# Patient Record
Sex: MALE | Race: WHITE | Employment: OTHER | URBAN - NONMETROPOLITAN AREA
[De-identification: names, ages, dates, MRNs, and addresses within clinical notes are randomized per-mention and may not be internally consistent; named-entity substitution may affect disease eponyms.]

---

## 2017-02-13 ENCOUNTER — OFFICE VISIT (OUTPATIENT)
Dept: FAMILY MEDICINE CLINIC | Age: 72
End: 2017-02-13

## 2017-02-13 VITALS
SYSTOLIC BLOOD PRESSURE: 122 MMHG | OXYGEN SATURATION: 96 % | DIASTOLIC BLOOD PRESSURE: 76 MMHG | HEIGHT: 65 IN | HEART RATE: 99 BPM | BODY MASS INDEX: 29.32 KG/M2 | WEIGHT: 176 LBS

## 2017-02-13 DIAGNOSIS — Z12.5 PROSTATE CANCER SCREENING: ICD-10-CM

## 2017-02-13 DIAGNOSIS — M54.17 LUMBOSACRAL RADICULOPATHY: ICD-10-CM

## 2017-02-13 DIAGNOSIS — R09.81 CHRONIC NASAL CONGESTION: Chronic | ICD-10-CM

## 2017-02-13 DIAGNOSIS — E55.9 VITAMIN D DEFICIENCY: ICD-10-CM

## 2017-02-13 DIAGNOSIS — I25.83 CORONARY ARTERY DISEASE DUE TO LIPID RICH PLAQUE: ICD-10-CM

## 2017-02-13 DIAGNOSIS — F32.9 REACTIVE DEPRESSION: Primary | ICD-10-CM

## 2017-02-13 DIAGNOSIS — E78.2 MIXED HYPERLIPIDEMIA: ICD-10-CM

## 2017-02-13 DIAGNOSIS — M47.817 FACET JOINT DISEASE OF LUMBOSACRAL REGION: ICD-10-CM

## 2017-02-13 DIAGNOSIS — I25.10 CORONARY ARTERY DISEASE DUE TO LIPID RICH PLAQUE: ICD-10-CM

## 2017-02-13 DIAGNOSIS — R73.9 HYPERGLYCEMIA: ICD-10-CM

## 2017-02-13 DIAGNOSIS — G89.4 CHRONIC PAIN SYNDROME: ICD-10-CM

## 2017-02-13 DIAGNOSIS — J32.4 CHRONIC PANSINUSITIS: ICD-10-CM

## 2017-02-13 DIAGNOSIS — F51.01 PRIMARY INSOMNIA: ICD-10-CM

## 2017-02-13 DIAGNOSIS — M54.41 CHRONIC MIDLINE LOW BACK PAIN WITH BILATERAL SCIATICA: ICD-10-CM

## 2017-02-13 DIAGNOSIS — S32.000S COMPRESSION FRACTURE OF LUMBAR VERTEBRA, SEQUELA: Chronic | ICD-10-CM

## 2017-02-13 DIAGNOSIS — F17.200 SMOKER'S RESPIRATORY SYNDROME: ICD-10-CM

## 2017-02-13 DIAGNOSIS — M15.3 POST-TRAUMATIC OSTEOARTHRITIS OF MULTIPLE JOINTS: ICD-10-CM

## 2017-02-13 DIAGNOSIS — G89.29 CHRONIC MIDLINE LOW BACK PAIN WITH BILATERAL SCIATICA: ICD-10-CM

## 2017-02-13 DIAGNOSIS — M54.42 CHRONIC MIDLINE LOW BACK PAIN WITH BILATERAL SCIATICA: ICD-10-CM

## 2017-02-13 DIAGNOSIS — Z23 NEED FOR INFLUENZA VACCINATION: ICD-10-CM

## 2017-02-13 DIAGNOSIS — M51.36 DEGENERATION OF INTERVERTEBRAL DISC OF LUMBAR REGION: ICD-10-CM

## 2017-02-13 PROCEDURE — 1123F ACP DISCUSS/DSCN MKR DOCD: CPT | Performed by: FAMILY MEDICINE

## 2017-02-13 PROCEDURE — G0008 ADMIN INFLUENZA VIRUS VAC: HCPCS | Performed by: FAMILY MEDICINE

## 2017-02-13 PROCEDURE — 4040F PNEUMOC VAC/ADMIN/RCVD: CPT | Performed by: FAMILY MEDICINE

## 2017-02-13 PROCEDURE — 90688 IIV4 VACCINE SPLT 0.5 ML IM: CPT | Performed by: FAMILY MEDICINE

## 2017-02-13 PROCEDURE — 99214 OFFICE O/P EST MOD 30 MIN: CPT | Performed by: FAMILY MEDICINE

## 2017-02-13 PROCEDURE — G8484 FLU IMMUNIZE NO ADMIN: HCPCS | Performed by: FAMILY MEDICINE

## 2017-02-13 PROCEDURE — G8420 CALC BMI NORM PARAMETERS: HCPCS | Performed by: FAMILY MEDICINE

## 2017-02-13 PROCEDURE — 3017F COLORECTAL CA SCREEN DOC REV: CPT | Performed by: FAMILY MEDICINE

## 2017-02-13 PROCEDURE — G8427 DOCREV CUR MEDS BY ELIG CLIN: HCPCS | Performed by: FAMILY MEDICINE

## 2017-02-13 PROCEDURE — 4004F PT TOBACCO SCREEN RCVD TLK: CPT | Performed by: FAMILY MEDICINE

## 2017-02-13 PROCEDURE — G8599 NO ASA/ANTIPLAT THER USE RNG: HCPCS | Performed by: FAMILY MEDICINE

## 2017-02-13 RX ORDER — DULOXETIN HYDROCHLORIDE 60 MG/1
CAPSULE, DELAYED RELEASE ORAL
Qty: 30 CAPSULE | Refills: 11 | Status: SHIPPED | OUTPATIENT
Start: 2017-02-13 | End: 2018-02-14 | Stop reason: SDUPTHER

## 2017-02-13 RX ORDER — OXYCODONE AND ACETAMINOPHEN 10; 325 MG/1; MG/1
TABLET ORAL
Refills: 0 | COMMUNITY
Start: 2017-01-21 | End: 2017-12-18 | Stop reason: SDUPTHER

## 2017-02-13 RX ORDER — FLUTICASONE PROPIONATE 50 MCG
SPRAY, SUSPENSION (ML) NASAL
Qty: 1 BOTTLE | Refills: 11 | Status: SHIPPED | OUTPATIENT
Start: 2017-02-13 | End: 2018-06-21 | Stop reason: SDUPTHER

## 2017-02-13 ASSESSMENT — PATIENT HEALTH QUESTIONNAIRE - PHQ9
SUM OF ALL RESPONSES TO PHQ QUESTIONS 1-9: 0
2. FEELING DOWN, DEPRESSED OR HOPELESS: 0
1. LITTLE INTEREST OR PLEASURE IN DOING THINGS: 0
SUM OF ALL RESPONSES TO PHQ9 QUESTIONS 1 & 2: 0

## 2017-03-20 DIAGNOSIS — J41.0 SIMPLE CHRONIC BRONCHITIS (HCC): ICD-10-CM

## 2017-03-20 RX ORDER — BUDESONIDE AND FORMOTEROL FUMARATE DIHYDRATE 160; 4.5 UG/1; UG/1
AEROSOL RESPIRATORY (INHALATION)
Qty: 10.2 G | Refills: 11 | Status: SHIPPED | OUTPATIENT
Start: 2017-03-20 | End: 2018-04-09 | Stop reason: SDUPTHER

## 2017-03-22 ENCOUNTER — NURSE ONLY (OUTPATIENT)
Dept: FAMILY MEDICINE CLINIC | Age: 72
End: 2017-03-22

## 2017-03-22 DIAGNOSIS — M54.41 CHRONIC MIDLINE LOW BACK PAIN WITH BILATERAL SCIATICA: ICD-10-CM

## 2017-03-22 DIAGNOSIS — G89.29 CHRONIC MIDLINE LOW BACK PAIN WITH BILATERAL SCIATICA: ICD-10-CM

## 2017-03-22 DIAGNOSIS — R73.9 HYPERGLYCEMIA: ICD-10-CM

## 2017-03-22 DIAGNOSIS — E55.9 VITAMIN D DEFICIENCY: ICD-10-CM

## 2017-03-22 DIAGNOSIS — M54.42 CHRONIC MIDLINE LOW BACK PAIN WITH BILATERAL SCIATICA: ICD-10-CM

## 2017-03-22 DIAGNOSIS — E78.2 MIXED HYPERLIPIDEMIA: ICD-10-CM

## 2017-03-22 DIAGNOSIS — Z12.5 PROSTATE CANCER SCREENING: ICD-10-CM

## 2017-03-22 LAB
A/G RATIO: 2.2 (ref 1.1–2.2)
ALBUMIN SERPL-MCNC: 4.6 G/DL (ref 3.4–5)
ALP BLD-CCNC: 102 U/L (ref 40–129)
ALT SERPL-CCNC: 12 U/L (ref 10–40)
ANION GAP SERPL CALCULATED.3IONS-SCNC: 15 MMOL/L (ref 3–16)
AST SERPL-CCNC: 14 U/L (ref 15–37)
BASOPHILS ABSOLUTE: 0 K/UL (ref 0–0.2)
BASOPHILS RELATIVE PERCENT: 0.6 %
BILIRUB SERPL-MCNC: 0.3 MG/DL (ref 0–1)
BUN BLDV-MCNC: 12 MG/DL (ref 7–20)
CALCIUM SERPL-MCNC: 9.7 MG/DL (ref 8.3–10.6)
CHLORIDE BLD-SCNC: 101 MMOL/L (ref 99–110)
CHOLESTEROL, TOTAL: 187 MG/DL (ref 0–199)
CO2: 27 MMOL/L (ref 21–32)
CREAT SERPL-MCNC: 0.8 MG/DL (ref 0.8–1.3)
EOSINOPHILS ABSOLUTE: 0.3 K/UL (ref 0–0.6)
EOSINOPHILS RELATIVE PERCENT: 3.5 %
GFR AFRICAN AMERICAN: >60
GFR NON-AFRICAN AMERICAN: >60
GLOBULIN: 2.1 G/DL
GLUCOSE BLD-MCNC: 95 MG/DL (ref 70–99)
HCT VFR BLD CALC: 47.6 % (ref 40.5–52.5)
HDLC SERPL-MCNC: 64 MG/DL (ref 40–60)
HEMOGLOBIN: 15.8 G/DL (ref 13.5–17.5)
LDL CHOLESTEROL CALCULATED: 106 MG/DL
LYMPHOCYTES ABSOLUTE: 2.3 K/UL (ref 1–5.1)
LYMPHOCYTES RELATIVE PERCENT: 31.9 %
MCH RBC QN AUTO: 34 PG (ref 26–34)
MCHC RBC AUTO-ENTMCNC: 33.2 G/DL (ref 31–36)
MCV RBC AUTO: 102.2 FL (ref 80–100)
MONOCYTES ABSOLUTE: 0.6 K/UL (ref 0–1.3)
MONOCYTES RELATIVE PERCENT: 8.4 %
NEUTROPHILS ABSOLUTE: 4.1 K/UL (ref 1.7–7.7)
NEUTROPHILS RELATIVE PERCENT: 55.6 %
PDW BLD-RTO: 13.8 % (ref 12.4–15.4)
PLATELET # BLD: 209 K/UL (ref 135–450)
PMV BLD AUTO: 9.2 FL (ref 5–10.5)
POTASSIUM SERPL-SCNC: 4.4 MMOL/L (ref 3.5–5.1)
PROSTATE SPECIFIC ANTIGEN: 0.48 NG/ML (ref 0–4)
RBC # BLD: 4.66 M/UL (ref 4.2–5.9)
SODIUM BLD-SCNC: 143 MMOL/L (ref 136–145)
TOTAL PROTEIN: 6.7 G/DL (ref 6.4–8.2)
TRIGL SERPL-MCNC: 83 MG/DL (ref 0–150)
TSH SERPL DL<=0.05 MIU/L-ACNC: 1.01 UIU/ML (ref 0.27–4.2)
VITAMIN D 25-HYDROXY: 33.3 NG/ML
VLDLC SERPL CALC-MCNC: 17 MG/DL
WBC # BLD: 7.3 K/UL (ref 4–11)

## 2017-03-22 PROCEDURE — 36415 COLL VENOUS BLD VENIPUNCTURE: CPT | Performed by: FAMILY MEDICINE

## 2017-03-29 ENCOUNTER — TELEPHONE (OUTPATIENT)
Dept: FAMILY MEDICINE CLINIC | Age: 72
End: 2017-03-29

## 2017-03-29 DIAGNOSIS — E78.2 MIXED HYPERLIPIDEMIA: Primary | ICD-10-CM

## 2017-03-29 DIAGNOSIS — R79.89 ABNORMAL LFTS: ICD-10-CM

## 2017-03-29 DIAGNOSIS — E78.5 HYPERLIPIDEMIA, UNSPECIFIED HYPERLIPIDEMIA TYPE: ICD-10-CM

## 2017-03-29 RX ORDER — ATORVASTATIN CALCIUM 20 MG/1
40 TABLET, FILM COATED ORAL DAILY
Qty: 30 TABLET | Refills: 11 | Status: SHIPPED | OUTPATIENT
Start: 2017-03-29 | End: 2017-08-14 | Stop reason: SDUPTHER

## 2017-05-17 DIAGNOSIS — F17.200 SMOKER'S RESPIRATORY SYNDROME: ICD-10-CM

## 2017-05-17 RX ORDER — ALBUTEROL SULFATE 90 UG/1
AEROSOL, METERED RESPIRATORY (INHALATION)
Qty: 1 INHALER | Refills: 3 | Status: SHIPPED | OUTPATIENT
Start: 2017-05-17 | End: 2017-08-15 | Stop reason: SDUPTHER

## 2017-06-14 ENCOUNTER — HOSPITAL ENCOUNTER (OUTPATIENT)
Dept: OTHER | Age: 72
Discharge: HOME | End: 2017-06-14
Attending: ANESTHESIOLOGY | Admitting: ANESTHESIOLOGY

## 2017-06-14 DIAGNOSIS — M43.16 SPONDYLOLISTHESIS OF LUMBAR REGION: ICD-10-CM

## 2017-08-14 ENCOUNTER — OFFICE VISIT (OUTPATIENT)
Dept: FAMILY MEDICINE CLINIC | Age: 72
End: 2017-08-14

## 2017-08-14 VITALS
HEART RATE: 71 BPM | DIASTOLIC BLOOD PRESSURE: 76 MMHG | SYSTOLIC BLOOD PRESSURE: 126 MMHG | OXYGEN SATURATION: 97 % | WEIGHT: 176.2 LBS | BODY MASS INDEX: 29.36 KG/M2 | HEIGHT: 65 IN

## 2017-08-14 DIAGNOSIS — I25.83 CORONARY ARTERY DISEASE DUE TO LIPID RICH PLAQUE: ICD-10-CM

## 2017-08-14 DIAGNOSIS — G89.29 CHRONIC LOW BACK PAIN, UNSPECIFIED BACK PAIN LATERALITY, WITH SCIATICA PRESENCE UNSPECIFIED: ICD-10-CM

## 2017-08-14 DIAGNOSIS — F17.200 SMOKER'S RESPIRATORY SYNDROME: ICD-10-CM

## 2017-08-14 DIAGNOSIS — F51.01 PRIMARY INSOMNIA: ICD-10-CM

## 2017-08-14 DIAGNOSIS — E78.2 MIXED HYPERLIPIDEMIA: ICD-10-CM

## 2017-08-14 DIAGNOSIS — R73.9 HYPERGLYCEMIA: ICD-10-CM

## 2017-08-14 DIAGNOSIS — I25.10 CORONARY ARTERY DISEASE DUE TO LIPID RICH PLAQUE: ICD-10-CM

## 2017-08-14 DIAGNOSIS — S68.119S AMPUTATION FINGER, SEQUELA (HCC): ICD-10-CM

## 2017-08-14 DIAGNOSIS — R07.89 RIGHT-SIDED CHEST WALL PAIN: Primary | ICD-10-CM

## 2017-08-14 DIAGNOSIS — M51.36 DEGENERATION OF INTERVERTEBRAL DISC OF LUMBAR REGION: ICD-10-CM

## 2017-08-14 DIAGNOSIS — M54.5 CHRONIC LOW BACK PAIN, UNSPECIFIED BACK PAIN LATERALITY, WITH SCIATICA PRESENCE UNSPECIFIED: ICD-10-CM

## 2017-08-14 DIAGNOSIS — Z13.6 SCREENING FOR AAA (ABDOMINAL AORTIC ANEURYSM): ICD-10-CM

## 2017-08-14 DIAGNOSIS — F32.9 REACTIVE DEPRESSION: ICD-10-CM

## 2017-08-14 DIAGNOSIS — J41.1 MUCOPURULENT CHRONIC BRONCHITIS (HCC): ICD-10-CM

## 2017-08-14 PROCEDURE — G8599 NO ASA/ANTIPLAT THER USE RNG: HCPCS | Performed by: FAMILY MEDICINE

## 2017-08-14 PROCEDURE — G8926 SPIRO NO PERF OR DOC: HCPCS | Performed by: FAMILY MEDICINE

## 2017-08-14 PROCEDURE — 99214 OFFICE O/P EST MOD 30 MIN: CPT | Performed by: FAMILY MEDICINE

## 2017-08-14 PROCEDURE — G8419 CALC BMI OUT NRM PARAM NOF/U: HCPCS | Performed by: FAMILY MEDICINE

## 2017-08-14 PROCEDURE — 4004F PT TOBACCO SCREEN RCVD TLK: CPT | Performed by: FAMILY MEDICINE

## 2017-08-14 PROCEDURE — 3023F SPIROM DOC REV: CPT | Performed by: FAMILY MEDICINE

## 2017-08-14 PROCEDURE — 4040F PNEUMOC VAC/ADMIN/RCVD: CPT | Performed by: FAMILY MEDICINE

## 2017-08-14 PROCEDURE — G8427 DOCREV CUR MEDS BY ELIG CLIN: HCPCS | Performed by: FAMILY MEDICINE

## 2017-08-14 PROCEDURE — 3017F COLORECTAL CA SCREEN DOC REV: CPT | Performed by: FAMILY MEDICINE

## 2017-08-14 PROCEDURE — 1123F ACP DISCUSS/DSCN MKR DOCD: CPT | Performed by: FAMILY MEDICINE

## 2017-08-14 RX ORDER — ATORVASTATIN CALCIUM 40 MG/1
TABLET, FILM COATED ORAL
Refills: 11 | COMMUNITY
Start: 2017-06-30 | End: 2017-08-14 | Stop reason: CLARIF

## 2017-08-14 RX ORDER — IPRATROPIUM BROMIDE AND ALBUTEROL SULFATE 2.5; .5 MG/3ML; MG/3ML
SOLUTION RESPIRATORY (INHALATION)
Qty: 120 VIAL | Refills: 11 | Status: SHIPPED | OUTPATIENT
Start: 2017-08-14

## 2017-08-14 RX ORDER — ATORVASTATIN CALCIUM 40 MG/1
40 TABLET, FILM COATED ORAL DAILY
Qty: 30 TABLET | Refills: 11 | Status: SHIPPED | OUTPATIENT
Start: 2017-08-14 | End: 2018-01-01 | Stop reason: SDUPTHER

## 2017-08-14 RX ORDER — OXYCODONE HYDROCHLORIDE 15 MG/1
1 TABLET, FILM COATED, EXTENDED RELEASE ORAL EVERY 12 HOURS SCHEDULED
Refills: 0 | Status: ON HOLD | COMMUNITY
Start: 2017-06-08 | End: 2019-01-01

## 2017-08-14 RX ORDER — PREDNISONE 10 MG/1
TABLET ORAL
Qty: 30 TABLET | Refills: 0 | Status: SHIPPED | OUTPATIENT
Start: 2017-08-14 | End: 2017-12-18 | Stop reason: ALTCHOICE

## 2017-08-15 ENCOUNTER — TELEPHONE (OUTPATIENT)
Dept: FAMILY MEDICINE CLINIC | Age: 72
End: 2017-08-15

## 2017-08-15 DIAGNOSIS — F17.200 SMOKER'S RESPIRATORY SYNDROME: ICD-10-CM

## 2017-08-15 RX ORDER — ALBUTEROL SULFATE 90 UG/1
AEROSOL, METERED RESPIRATORY (INHALATION)
Qty: 8.5 G | Refills: 3 | Status: SHIPPED | OUTPATIENT
Start: 2017-08-15 | End: 2017-11-14 | Stop reason: SDUPTHER

## 2017-08-18 ENCOUNTER — HOSPITAL ENCOUNTER (OUTPATIENT)
Dept: ULTRASOUND IMAGING | Age: 72
Discharge: HOME | End: 2017-08-18
Attending: FAMILY MEDICINE | Admitting: FAMILY MEDICINE

## 2017-08-18 DIAGNOSIS — Z13.6 ENCOUNTER FOR SCREENING FOR CARDIOVASCULAR DISORDERS: ICD-10-CM

## 2017-08-18 DIAGNOSIS — Z13.6 SCREENING FOR AAA (ABDOMINAL AORTIC ANEURYSM): ICD-10-CM

## 2017-08-28 ENCOUNTER — CARE COORDINATION (OUTPATIENT)
Dept: CARE COORDINATION | Age: 72
End: 2017-08-28

## 2017-10-06 ENCOUNTER — HOSPITAL ENCOUNTER (OUTPATIENT)
Dept: GENERAL RADIOLOGY | Age: 72
Discharge: HOME | End: 2017-10-06
Attending: NEUROLOGICAL SURGERY | Admitting: NEUROLOGICAL SURGERY

## 2017-10-06 DIAGNOSIS — R93.7 MUSCULOSKELETAL SYSTEM IMAGING ABNORMALITY: ICD-10-CM

## 2017-10-06 DIAGNOSIS — R93.7 ABNORMAL FINDINGS ON DIAGNOSTIC IMAGING OF OTHER PARTS OF MUSCULOSKELETAL SYSTEM: ICD-10-CM

## 2017-10-13 ENCOUNTER — NURSE ONLY (OUTPATIENT)
Dept: FAMILY MEDICINE CLINIC | Age: 72
End: 2017-10-13

## 2017-10-13 DIAGNOSIS — Z23 NEED FOR INFLUENZA VACCINATION: Primary | ICD-10-CM

## 2017-10-13 PROCEDURE — 90688 IIV4 VACCINE SPLT 0.5 ML IM: CPT | Performed by: FAMILY MEDICINE

## 2017-10-13 PROCEDURE — G0008 ADMIN INFLUENZA VIRUS VAC: HCPCS | Performed by: FAMILY MEDICINE

## 2017-10-13 NOTE — PROGRESS NOTES
Vaccine Information Sheet, \"Influenza - Inactivated\"  given to Katia Black, or parent/legal guardian of  Katia Black and verbalized understanding. Patient responses:    Have you ever had a reaction to a flu vaccine? No  Are you able to eat eggs without adverse effects? Yes  Do you have any current illness? No  Have you ever had Guillian Denbo Syndrome? No    Flu vaccine given per order. Please see immunization tab.

## 2017-11-14 DIAGNOSIS — F17.200 SMOKER'S RESPIRATORY SYNDROME: ICD-10-CM

## 2017-12-05 ENCOUNTER — TELEPHONE (OUTPATIENT)
Dept: FAMILY MEDICINE CLINIC | Age: 72
End: 2017-12-05

## 2017-12-12 NOTE — TELEPHONE ENCOUNTER
Attempted to contact patient on 12/12/2017. Result: left message on the patient's voicemail asking patient to return my call. Pre-Visit planning not completed.

## 2017-12-13 NOTE — TELEPHONE ENCOUNTER
Attempted to contact patient on 12/13/2017. Result: left message with female asking patient to return my call. Pre-Visit planning not completed.

## 2017-12-18 ENCOUNTER — OFFICE VISIT (OUTPATIENT)
Dept: FAMILY MEDICINE CLINIC | Age: 72
End: 2017-12-18

## 2017-12-18 VITALS
SYSTOLIC BLOOD PRESSURE: 120 MMHG | BODY MASS INDEX: 29.3 KG/M2 | WEIGHT: 171.6 LBS | HEART RATE: 71 BPM | DIASTOLIC BLOOD PRESSURE: 66 MMHG | OXYGEN SATURATION: 95 % | HEIGHT: 64 IN

## 2017-12-18 DIAGNOSIS — Z12.5 PROSTATE CANCER SCREENING: ICD-10-CM

## 2017-12-18 DIAGNOSIS — R73.9 HYPERGLYCEMIA: ICD-10-CM

## 2017-12-18 DIAGNOSIS — J44.1 COPD WITH ACUTE EXACERBATION (HCC): ICD-10-CM

## 2017-12-18 DIAGNOSIS — S32.030S CLOSED COMPRESSION FRACTURE OF THIRD LUMBAR VERTEBRA, SEQUELA: Chronic | ICD-10-CM

## 2017-12-18 DIAGNOSIS — G89.4 CHRONIC PAIN SYNDROME: ICD-10-CM

## 2017-12-18 DIAGNOSIS — E55.9 VITAMIN D DEFICIENCY: ICD-10-CM

## 2017-12-18 DIAGNOSIS — F33.41 MAJOR DEPRESSIVE DISORDER, RECURRENT, IN PARTIAL REMISSION (HCC): ICD-10-CM

## 2017-12-18 DIAGNOSIS — E78.2 MIXED HYPERLIPIDEMIA: ICD-10-CM

## 2017-12-18 DIAGNOSIS — I25.10 CORONARY ARTERY DISEASE DUE TO LIPID RICH PLAQUE: ICD-10-CM

## 2017-12-18 DIAGNOSIS — I25.83 CORONARY ARTERY DISEASE DUE TO LIPID RICH PLAQUE: ICD-10-CM

## 2017-12-18 DIAGNOSIS — F17.200 SMOKER'S RESPIRATORY SYNDROME: Primary | ICD-10-CM

## 2017-12-18 PROCEDURE — G8427 DOCREV CUR MEDS BY ELIG CLIN: HCPCS | Performed by: FAMILY MEDICINE

## 2017-12-18 PROCEDURE — G8926 SPIRO NO PERF OR DOC: HCPCS | Performed by: FAMILY MEDICINE

## 2017-12-18 PROCEDURE — G8484 FLU IMMUNIZE NO ADMIN: HCPCS | Performed by: FAMILY MEDICINE

## 2017-12-18 PROCEDURE — 99214 OFFICE O/P EST MOD 30 MIN: CPT | Performed by: FAMILY MEDICINE

## 2017-12-18 PROCEDURE — 3017F COLORECTAL CA SCREEN DOC REV: CPT | Performed by: FAMILY MEDICINE

## 2017-12-18 PROCEDURE — 4040F PNEUMOC VAC/ADMIN/RCVD: CPT | Performed by: FAMILY MEDICINE

## 2017-12-18 PROCEDURE — G8599 NO ASA/ANTIPLAT THER USE RNG: HCPCS | Performed by: FAMILY MEDICINE

## 2017-12-18 PROCEDURE — 4004F PT TOBACCO SCREEN RCVD TLK: CPT | Performed by: FAMILY MEDICINE

## 2017-12-18 PROCEDURE — G8419 CALC BMI OUT NRM PARAM NOF/U: HCPCS | Performed by: FAMILY MEDICINE

## 2017-12-18 PROCEDURE — 3023F SPIROM DOC REV: CPT | Performed by: FAMILY MEDICINE

## 2017-12-18 PROCEDURE — 1123F ACP DISCUSS/DSCN MKR DOCD: CPT | Performed by: FAMILY MEDICINE

## 2017-12-18 RX ORDER — PREDNISONE 20 MG/1
40 TABLET ORAL
COMMUNITY
Start: 2017-12-17 | End: 2017-12-24

## 2017-12-18 RX ORDER — OXYCODONE AND ACETAMINOPHEN 10; 325 MG/1; MG/1
7.5 TABLET ORAL EVERY 6 HOURS PRN
COMMUNITY
Start: 2017-08-31 | End: 2019-01-01

## 2017-12-18 NOTE — PROGRESS NOTES
SUBJECTIVE:    2017    Wilfred Saliva (: )    67 y.o.    male    Prior to Visit Medications    Medication Sig Taking? Authorizing Provider   predniSONE (DELTASONE) 20 MG tablet Take 40 mg by mouth Yes Historical Provider, MD   oxyCODONE-acetaminophen (PERCOCET)  MG per tablet Take by mouth . Yes Historical Provider, MD   PROAIR  (90 Base) MCG/ACT inhaler INHALE 2 PUFFS BY MOUTH 4 TIMES DAILY WITH AWAY FROM HOME Yes Ollie Urrutia MD   OXYCONTIN 15 MG T12A extended release tablet  Yes Historical Provider, MD   ipratropium-albuterol (DUONEB) 0.5-2.5 (3) MG/3ML SOLN nebulizer solution Use qid Yes Ollie Urrutia MD   atorvastatin (LIPITOR) 40 MG tablet Take 1 tablet by mouth daily Yes Ollie Urrutia MD   SYMBICORT 160-4.5 MCG/ACT AERO INHALE 2 PUFFS INTO THE LUNGS 2 TIMES DAILY Yes Ollie Urrutia MD   fluticasone [facility]) 50 MCG/ACT nasal spray 2 sprays each nostril daily Yes Ollie Urrutia MD   DULoxetine (CYMBALTA) 60 MG extended release capsule 1 daily Yes Ollie Urrutia MD   Respiratory Therapy Supplies (NEBULIZER COMPRESSOR) KIT 1 kit by Does not apply route once for 1 dose  Ollie Urrutia MD   oxyCODONE-acetaminophen (PERCOCET)  MG per tablet Take 1 tablet by mouth 2 times daily Oh CTP RX 29778  Fill after 16  Silvestre Wray NP       ALLERGIES:  No Known Allergies    Chief Complaint   Patient presents with    Depression     He states sometimes he gets down at times and anxious.  Hyperlipidemia     Watching diet.  Insomnia     He states he has some trouble sleeping.  Fall     He states he was in [place] ER yesterday due to he tripped on step on porch and fell  They did CT scan and Cxray. He did not have any fractures.  Shaking     He get shakiness in his hands x 1 month.  Wheezing     He sometimes has wheezing and chest congestion x a long while.     States he fell yesterday going upstairs and he went to [place] ER, no fractured ribs and breathing was OK, he has to  some pain med and prednisone to  today. He is still smoking 1ppd or less, he states he is just too anxious to stop. He is SOB with exertion. He takes the duloxetine every day. Since he talks about being anxious, offered to prescribe some Buspar to help him with this. Has tremor of hands. Discussed getting a low dose CT of the chest screening but he had a chest CT yesterday which showed emphysema and no pulmonary nodules. AAA screening was normal. He is sleeping with his oxygen. Will discussed screening CT of chest next year. HPI  Kassidy Lomeli periodically notices shaking when he is trying to do some purposeful activity, such as turning pages. He wonders why he is getting more short of breath. Has noted wheezing and chest congestion. Is here for cholesterol. Tolerating medication. Trying to watch low-fat diet. Weight stable. No change in exercise. HX: (> 3 status chronic/inact. Prob) or  Wt Readings from Last 3 Encounters:   12/18/17 171 lb 9.6 oz (77.8 kg)   08/27/17 176 lb (79.8 kg)   08/14/17 176 lb 3.2 oz (79.9 kg)       Occupation Retired factory              HPI:  (>4 )  LOCATION:  QUALITY:SEVERITY:  DURATION:  TIMING:  CONTEXT:  MOD. FACTORS:  ASSOC. S/S:    Pertinent items are noted in HPI.  (+/- 2-9 systems)  ROS  All other systems reviewed and are negative except as noted above  Additional review of systems may be scanned into the media section of this medical record. Any responses requiring further intervention were pursued.     Past Medical History:   Diagnosis Date    Amputation finger     Ankle fracture, left     Chronic nasal congestion     Chronic pain syndrome     Compression fracture of lumbar vertebra (HCC)     COPD (chronic obstructive pulmonary disease) (HCC)     DDD (degenerative disc disease)     Depression     Facet joint disease of lumbosacral 08/27/2017 >60     GFR  08/27/2017 >60     Calcium 08/27/2017 9.2     Total Protein 08/27/2017 6.7     Alb 08/27/2017 3.8     Albumin/Globulin Ratio 08/27/2017 1.3     Total Bilirubin 08/27/2017 0.5     Alkaline Phosphatase 08/27/2017 157*    ALT 08/27/2017 15     AST 08/27/2017 20     Globulin 08/27/2017 2.9     WBC 08/27/2017 8.9     RBC 08/27/2017 4.45     Hemoglobin 08/27/2017 14.9     Hematocrit 08/27/2017 44.5     MCV 08/27/2017 100.1*    MCH 08/27/2017 33.6     MCHC 08/27/2017 33.6     RDW 08/27/2017 13.8     Platelets 59/55/0829 258     MPV 08/27/2017 8.2     Neutrophils % 08/27/2017 72.7     Lymphocytes % 08/27/2017 13.2     Monocytes % 08/27/2017 11.4     Eosinophils % 08/27/2017 2.2     Basophils % 08/27/2017 0.5     Neutrophils # 08/27/2017 6.5     Lymphocytes # 08/27/2017 1.2     Monocytes # 08/27/2017 1.0     Eosinophils # 08/27/2017 0.2     Basophils # 08/27/2017 0.0     Ventricular Rate 08/30/2017 91     Atrial Rate 08/30/2017 91     P-R Interval 08/30/2017 152     QRS Duration 08/30/2017 84     Q-T Interval 08/30/2017 356     QTc Calculation (Bazett) 08/30/2017 437     P Axis 08/30/2017 60     R Axis 08/30/2017 -33     T Axis 08/30/2017 47     Diagnosis 08/30/2017                      Value:Normal sinus rhythm  Left axis deviation  Abnormal ECG  When compared with ECG of 17-MAR-2016 20:48,  No significant change was found  Confirmed by Bhavana Vergara MD, [address] ([id number]) on 8/28/2017 10:43:28 AM      Pro-BNP 08/27/2017 43     Troponin 08/27/2017 <0.01     Blood Culture, Routine 09/02/2017 No growth after 5 days of incubation.  Culture, Blood 2 09/02/2017 No growth after 5 days of incubation.      Lactic Acid 08/27/2017 0.9    Admission on 06/25/2017, Discharged on 06/25/2017   Component Date Value    Sodium 06/25/2017 143     Potassium 06/25/2017 4.5     Chloride 06/25/2017 101     CO2 06/25/2017 32     Anion Gap 06/25/2017 10     Glucose history were reviewed personally by me during the office visit. All problems listed in the assessment are stable unless noted otherwise. Medication profile reviewed personally by me during the office visit. Medication side effects and possible impairments from medications were discussed as applicable. Every effort has been made to assure accurate transcription by this voice recognition software. However, mistakes in transcription may still occur        ICarol am scribing for and in the presence of Dr Paty Nelson. 12/18/2017      1:25 PM     I, Dr. Ana Hanks, personally performed the services described in this documentation, as scribed by the above signed scribe in my presence, and it is both accurate and complete. I agree with the Chief Complaint, ROS, and Past Histories independently gathered by the clinical support staff and the remaining scribed note accurately describes my personal service to the patient.       12/18/2017    2:51 PM

## 2017-12-18 NOTE — LETTER
2017     Kayla Williamson      Dear Dmitri Owens: Thank you for enrolling in [address] Please follow the instructions below to securely access your online medical record. [employer] allows you to send messages to your doctor, view your test results, renew your prescriptions, schedule appointments, and more. How Do I Sign Up? 1. In your Internet browser, go to [email or web address]  2. Click on the Sign Up Now link in the Sign In box. You will see the New Member Sign Up page. 3. Enter your [employer] Access Code exactly as it appears below. You will not need to use this code after youve completed the sign-up process. If you do not sign up before the expiration date, you must request a new code. [employer] Access Code: Activation Code has   Activation Code Expiration: 2017  1:21 PM  Enter your Social Security Number ([id number]) and Date of Birth (mm/dd/yyyy) as indicated and click Submit. You will be taken to the next sign-up page. 4. Create a [employer] ID. This will be your [employer] login ID and cannot be changed, so think of one that is secure and easy to remember. 5. Create a [employer] password. You can change your password at any time. 6. Enter your Password Reset Question and Answer. This can be used at a later time if you forget your password. 7. Enter your e-mail address. You will receive e-mail notification when new information is available in [address] 8. Click Sign Up. You can now view your medical record. Additional Information  If you have questions, please contact the physician practice where you receive care. Remember, [employer] is NOT to be used for urgent needs. For medical emergencies, dial 911. For questions regarding your [employer] account call [phone]. If you have a clinical question, please call your doctor's office.

## 2018-01-01 ENCOUNTER — HOSPITAL ENCOUNTER (OUTPATIENT)
Age: 73
Discharge: HOME | End: 2018-09-13
Payer: MEDICARE

## 2018-01-01 ENCOUNTER — OFFICE VISIT (OUTPATIENT)
Dept: FAMILY MEDICINE CLINIC | Age: 73
End: 2018-01-01

## 2018-01-01 ENCOUNTER — HOSPITAL ENCOUNTER (OUTPATIENT)
Dept: GENERAL RADIOLOGY | Age: 73
Discharge: HOME | End: 2018-09-13
Payer: MEDICARE

## 2018-01-01 ENCOUNTER — TELEPHONE (OUTPATIENT)
Dept: FAMILY MEDICINE CLINIC | Age: 73
End: 2018-01-01

## 2018-01-01 VITALS
WEIGHT: 170.8 LBS | HEART RATE: 87 BPM | BODY MASS INDEX: 29.32 KG/M2 | DIASTOLIC BLOOD PRESSURE: 82 MMHG | SYSTOLIC BLOOD PRESSURE: 142 MMHG | OXYGEN SATURATION: 96 %

## 2018-01-01 VITALS
HEIGHT: 64 IN | HEART RATE: 89 BPM | SYSTOLIC BLOOD PRESSURE: 158 MMHG | WEIGHT: 170 LBS | BODY MASS INDEX: 29.02 KG/M2 | OXYGEN SATURATION: 96 % | DIASTOLIC BLOOD PRESSURE: 98 MMHG

## 2018-01-01 DIAGNOSIS — F41.9 ANXIETY: ICD-10-CM

## 2018-01-01 DIAGNOSIS — E78.2 MIXED HYPERLIPIDEMIA: ICD-10-CM

## 2018-01-01 DIAGNOSIS — I25.83 CORONARY ARTERY DISEASE DUE TO LIPID RICH PLAQUE: ICD-10-CM

## 2018-01-01 DIAGNOSIS — F17.200 SMOKER'S RESPIRATORY SYNDROME: ICD-10-CM

## 2018-01-01 DIAGNOSIS — F32.9 REACTIVE DEPRESSION: ICD-10-CM

## 2018-01-01 DIAGNOSIS — Z91.81 AT HIGH RISK FOR FALLS: ICD-10-CM

## 2018-01-01 DIAGNOSIS — S70.02XD HEMATOMA OF HIP, LEFT, SUBSEQUENT ENCOUNTER: ICD-10-CM

## 2018-01-01 DIAGNOSIS — J41.1 MUCOPURULENT CHRONIC BRONCHITIS (HCC): Primary | ICD-10-CM

## 2018-01-01 DIAGNOSIS — I10 ESSENTIAL HYPERTENSION: ICD-10-CM

## 2018-01-01 DIAGNOSIS — I25.10 CORONARY ARTERY DISEASE DUE TO LIPID RICH PLAQUE: ICD-10-CM

## 2018-01-01 DIAGNOSIS — M25.552 LEFT HIP PAIN: Primary | ICD-10-CM

## 2018-01-01 DIAGNOSIS — Z23 NEED FOR INFLUENZA VACCINATION: ICD-10-CM

## 2018-01-01 DIAGNOSIS — T14.8XXA HEMATOMA: ICD-10-CM

## 2018-01-01 DIAGNOSIS — M25.552 LEFT HIP PAIN: ICD-10-CM

## 2018-01-01 PROCEDURE — G8419 CALC BMI OUT NRM PARAM NOF/U: HCPCS | Performed by: FAMILY MEDICINE

## 2018-01-01 PROCEDURE — 1123F ACP DISCUSS/DSCN MKR DOCD: CPT | Performed by: FAMILY MEDICINE

## 2018-01-01 PROCEDURE — 4004F PT TOBACCO SCREEN RCVD TLK: CPT | Performed by: FAMILY MEDICINE

## 2018-01-01 PROCEDURE — 1101F PT FALLS ASSESS-DOCD LE1/YR: CPT | Performed by: NURSE PRACTITIONER

## 2018-01-01 PROCEDURE — G8599 NO ASA/ANTIPLAT THER USE RNG: HCPCS | Performed by: NURSE PRACTITIONER

## 2018-01-01 PROCEDURE — 99214 OFFICE O/P EST MOD 30 MIN: CPT | Performed by: NURSE PRACTITIONER

## 2018-01-01 PROCEDURE — 4004F PT TOBACCO SCREEN RCVD TLK: CPT | Performed by: NURSE PRACTITIONER

## 2018-01-01 PROCEDURE — G8427 DOCREV CUR MEDS BY ELIG CLIN: HCPCS | Performed by: NURSE PRACTITIONER

## 2018-01-01 PROCEDURE — 99214 OFFICE O/P EST MOD 30 MIN: CPT | Performed by: FAMILY MEDICINE

## 2018-01-01 PROCEDURE — 90688 IIV4 VACCINE SPLT 0.5 ML IM: CPT | Performed by: NURSE PRACTITIONER

## 2018-01-01 PROCEDURE — 4040F PNEUMOC VAC/ADMIN/RCVD: CPT | Performed by: NURSE PRACTITIONER

## 2018-01-01 PROCEDURE — 1123F ACP DISCUSS/DSCN MKR DOCD: CPT | Performed by: NURSE PRACTITIONER

## 2018-01-01 PROCEDURE — 1101F PT FALLS ASSESS-DOCD LE1/YR: CPT | Performed by: FAMILY MEDICINE

## 2018-01-01 PROCEDURE — 73502 X-RAY EXAM HIP UNI 2-3 VIEWS: CPT

## 2018-01-01 PROCEDURE — G8427 DOCREV CUR MEDS BY ELIG CLIN: HCPCS | Performed by: FAMILY MEDICINE

## 2018-01-01 PROCEDURE — G8599 NO ASA/ANTIPLAT THER USE RNG: HCPCS | Performed by: FAMILY MEDICINE

## 2018-01-01 PROCEDURE — 3017F COLORECTAL CA SCREEN DOC REV: CPT | Performed by: NURSE PRACTITIONER

## 2018-01-01 PROCEDURE — G0008 ADMIN INFLUENZA VIRUS VAC: HCPCS | Performed by: NURSE PRACTITIONER

## 2018-01-01 PROCEDURE — 3023F SPIROM DOC REV: CPT | Performed by: FAMILY MEDICINE

## 2018-01-01 PROCEDURE — G8419 CALC BMI OUT NRM PARAM NOF/U: HCPCS | Performed by: NURSE PRACTITIONER

## 2018-01-01 PROCEDURE — 3017F COLORECTAL CA SCREEN DOC REV: CPT | Performed by: FAMILY MEDICINE

## 2018-01-01 PROCEDURE — G8926 SPIRO NO PERF OR DOC: HCPCS | Performed by: FAMILY MEDICINE

## 2018-01-01 PROCEDURE — 4040F PNEUMOC VAC/ADMIN/RCVD: CPT | Performed by: FAMILY MEDICINE

## 2018-01-01 RX ORDER — LOSARTAN POTASSIUM 100 MG/1
100 TABLET ORAL DAILY
Qty: 30 TABLET | Refills: 5 | Status: SHIPPED
Start: 2018-01-01 | End: 2018-01-01 | Stop reason: SDUPTHER

## 2018-01-01 RX ORDER — ATORVASTATIN CALCIUM 40 MG/1
40 TABLET, FILM COATED ORAL DAILY
Qty: 30 TABLET | Refills: 11 | Status: SHIPPED | OUTPATIENT
Start: 2018-01-01

## 2018-01-01 RX ORDER — LOSARTAN POTASSIUM 100 MG/1
100 TABLET ORAL DAILY
Qty: 90 TABLET | Refills: 3 | Status: ON HOLD | OUTPATIENT
Start: 2018-01-01 | End: 2019-01-01 | Stop reason: HOSPADM

## 2018-01-01 RX ORDER — BUSPIRONE HYDROCHLORIDE 10 MG/1
10 TABLET ORAL 3 TIMES DAILY
Qty: 30 TABLET | Refills: 0 | OUTPATIENT
Start: 2018-01-01

## 2018-01-01 RX ORDER — BUSPIRONE HYDROCHLORIDE 10 MG/1
10 TABLET ORAL 3 TIMES DAILY
Qty: 30 TABLET | Refills: 5 | Status: SHIPPED | OUTPATIENT
Start: 2018-01-01 | End: 2019-01-01 | Stop reason: SDUPTHER

## 2018-01-01 ASSESSMENT — ENCOUNTER SYMPTOMS
BACK PAIN: 0
RESPIRATORY NEGATIVE: 1

## 2018-02-14 DIAGNOSIS — F32.9 REACTIVE DEPRESSION: ICD-10-CM

## 2018-02-14 DIAGNOSIS — M51.36 DEGENERATION OF INTERVERTEBRAL DISC OF LUMBAR REGION: ICD-10-CM

## 2018-02-14 DIAGNOSIS — M54.17 LUMBOSACRAL RADICULOPATHY: ICD-10-CM

## 2018-02-14 DIAGNOSIS — M54.41 CHRONIC MIDLINE LOW BACK PAIN WITH BILATERAL SCIATICA: ICD-10-CM

## 2018-02-14 DIAGNOSIS — F51.01 PRIMARY INSOMNIA: ICD-10-CM

## 2018-02-14 DIAGNOSIS — G89.4 CHRONIC PAIN SYNDROME: ICD-10-CM

## 2018-02-14 DIAGNOSIS — G89.29 CHRONIC MIDLINE LOW BACK PAIN WITH BILATERAL SCIATICA: ICD-10-CM

## 2018-02-14 DIAGNOSIS — S32.000S COMPRESSION FRACTURE OF LUMBAR VERTEBRA, SEQUELA: Chronic | ICD-10-CM

## 2018-02-14 DIAGNOSIS — M47.817 FACET JOINT DISEASE OF LUMBOSACRAL REGION: ICD-10-CM

## 2018-02-14 DIAGNOSIS — M15.3 POST-TRAUMATIC OSTEOARTHRITIS OF MULTIPLE JOINTS: ICD-10-CM

## 2018-02-14 DIAGNOSIS — M54.42 CHRONIC MIDLINE LOW BACK PAIN WITH BILATERAL SCIATICA: ICD-10-CM

## 2018-02-14 RX ORDER — DULOXETIN HYDROCHLORIDE 60 MG/1
CAPSULE, DELAYED RELEASE ORAL
Qty: 30 CAPSULE | Refills: 11 | Status: SHIPPED | OUTPATIENT
Start: 2018-02-14 | End: 2018-06-19 | Stop reason: SDUPTHER

## 2018-04-09 ENCOUNTER — TELEPHONE (OUTPATIENT)
Dept: FAMILY MEDICINE CLINIC | Age: 73
End: 2018-04-09

## 2018-04-09 ENCOUNTER — OFFICE VISIT (OUTPATIENT)
Dept: FAMILY MEDICINE CLINIC | Age: 73
End: 2018-04-09

## 2018-04-09 VITALS
BODY MASS INDEX: 29.25 KG/M2 | OXYGEN SATURATION: 97 % | DIASTOLIC BLOOD PRESSURE: 90 MMHG | HEART RATE: 94 BPM | SYSTOLIC BLOOD PRESSURE: 168 MMHG | WEIGHT: 170.4 LBS

## 2018-04-09 DIAGNOSIS — F33.41 MAJOR DEPRESSIVE DISORDER, RECURRENT, IN PARTIAL REMISSION (HCC): ICD-10-CM

## 2018-04-09 DIAGNOSIS — J41.1 MUCOPURULENT CHRONIC BRONCHITIS (HCC): ICD-10-CM

## 2018-04-09 DIAGNOSIS — R03.0 ELEVATED BLOOD PRESSURE READING: ICD-10-CM

## 2018-04-09 DIAGNOSIS — J41.0 SIMPLE CHRONIC BRONCHITIS (HCC): ICD-10-CM

## 2018-04-09 DIAGNOSIS — M70.21 OLECRANON BURSITIS OF RIGHT ELBOW: Primary | ICD-10-CM

## 2018-04-09 DIAGNOSIS — R22.1 NECK NODULE: ICD-10-CM

## 2018-04-09 PROCEDURE — G8419 CALC BMI OUT NRM PARAM NOF/U: HCPCS | Performed by: FAMILY MEDICINE

## 2018-04-09 PROCEDURE — 3017F COLORECTAL CA SCREEN DOC REV: CPT | Performed by: FAMILY MEDICINE

## 2018-04-09 PROCEDURE — G8427 DOCREV CUR MEDS BY ELIG CLIN: HCPCS | Performed by: FAMILY MEDICINE

## 2018-04-09 PROCEDURE — 4040F PNEUMOC VAC/ADMIN/RCVD: CPT | Performed by: FAMILY MEDICINE

## 2018-04-09 PROCEDURE — G8599 NO ASA/ANTIPLAT THER USE RNG: HCPCS | Performed by: FAMILY MEDICINE

## 2018-04-09 PROCEDURE — 4004F PT TOBACCO SCREEN RCVD TLK: CPT | Performed by: FAMILY MEDICINE

## 2018-04-09 PROCEDURE — G8926 SPIRO NO PERF OR DOC: HCPCS | Performed by: FAMILY MEDICINE

## 2018-04-09 PROCEDURE — G0444 DEPRESSION SCREEN ANNUAL: HCPCS | Performed by: FAMILY MEDICINE

## 2018-04-09 PROCEDURE — 1123F ACP DISCUSS/DSCN MKR DOCD: CPT | Performed by: FAMILY MEDICINE

## 2018-04-09 PROCEDURE — 99213 OFFICE O/P EST LOW 20 MIN: CPT | Performed by: FAMILY MEDICINE

## 2018-04-09 PROCEDURE — 3023F SPIROM DOC REV: CPT | Performed by: FAMILY MEDICINE

## 2018-04-09 RX ORDER — BUDESONIDE AND FORMOTEROL FUMARATE DIHYDRATE 160; 4.5 UG/1; UG/1
AEROSOL RESPIRATORY (INHALATION)
Qty: 10.2 G | Refills: 11 | Status: SHIPPED | OUTPATIENT
Start: 2018-04-09 | End: 2019-01-01 | Stop reason: SDUPTHER

## 2018-04-09 ASSESSMENT — PATIENT HEALTH QUESTIONNAIRE - PHQ9
8. MOVING OR SPEAKING SO SLOWLY THAT OTHER PEOPLE COULD HAVE NOTICED. OR THE OPPOSITE, BEING SO FIGETY OR RESTLESS THAT YOU HAVE BEEN MOVING AROUND A LOT MORE THAN USUAL: 0
10. IF YOU CHECKED OFF ANY PROBLEMS, HOW DIFFICULT HAVE THESE PROBLEMS MADE IT FOR YOU TO DO YOUR WORK, TAKE CARE OF THINGS AT HOME, OR GET ALONG WITH OTHER PEOPLE: 1
1. LITTLE INTEREST OR PLEASURE IN DOING THINGS: 2
2. FEELING DOWN, DEPRESSED OR HOPELESS: 1
SUM OF ALL RESPONSES TO PHQ QUESTIONS 1-9: 8
5. POOR APPETITE OR OVEREATING: 0
SUM OF ALL RESPONSES TO PHQ9 QUESTIONS 1 & 2: 3
7. TROUBLE CONCENTRATING ON THINGS, SUCH AS READING THE NEWSPAPER OR WATCHING TELEVISION: 0
6. FEELING BAD ABOUT YOURSELF - OR THAT YOU ARE A FAILURE OR HAVE LET YOURSELF OR YOUR FAMILY DOWN: 0
3. TROUBLE FALLING OR STAYING ASLEEP: 3
4. FEELING TIRED OR HAVING LITTLE ENERGY: 2
9. THOUGHTS THAT YOU WOULD BE BETTER OFF DEAD, OR OF HURTING YOURSELF: 0

## 2018-04-12 ENCOUNTER — HOSPITAL ENCOUNTER (OUTPATIENT)
Dept: CT IMAGING | Facility: MEDICAL CENTER | Age: 73
Discharge: HOME | End: 2018-04-12
Attending: FAMILY MEDICINE | Admitting: FAMILY MEDICINE

## 2018-04-12 DIAGNOSIS — R22.1 LOCALIZED SWELLING, MASS OR LUMP OF NECK: ICD-10-CM

## 2018-04-12 DIAGNOSIS — R22.1 NECK NODULE: ICD-10-CM

## 2018-04-30 ENCOUNTER — OFFICE VISIT (OUTPATIENT)
Dept: FAMILY MEDICINE CLINIC | Age: 73
End: 2018-04-30

## 2018-04-30 VITALS
WEIGHT: 170.2 LBS | HEART RATE: 84 BPM | BODY MASS INDEX: 29.21 KG/M2 | DIASTOLIC BLOOD PRESSURE: 92 MMHG | SYSTOLIC BLOOD PRESSURE: 170 MMHG | OXYGEN SATURATION: 96 %

## 2018-04-30 DIAGNOSIS — F41.9 ANXIETY: ICD-10-CM

## 2018-04-30 DIAGNOSIS — J32.4 CHRONIC PANSINUSITIS: ICD-10-CM

## 2018-04-30 DIAGNOSIS — I10 ESSENTIAL HYPERTENSION: Primary | ICD-10-CM

## 2018-04-30 DIAGNOSIS — J41.1 MUCOPURULENT CHRONIC BRONCHITIS (HCC): ICD-10-CM

## 2018-04-30 DIAGNOSIS — M70.21 OLECRANON BURSITIS OF RIGHT ELBOW: ICD-10-CM

## 2018-04-30 DIAGNOSIS — F32.9 REACTIVE DEPRESSION: ICD-10-CM

## 2018-04-30 DIAGNOSIS — F51.01 PRIMARY INSOMNIA: ICD-10-CM

## 2018-04-30 DIAGNOSIS — F17.200 SMOKER'S RESPIRATORY SYNDROME: ICD-10-CM

## 2018-04-30 PROCEDURE — 99214 OFFICE O/P EST MOD 30 MIN: CPT | Performed by: FAMILY MEDICINE

## 2018-04-30 PROCEDURE — 4004F PT TOBACCO SCREEN RCVD TLK: CPT | Performed by: FAMILY MEDICINE

## 2018-04-30 PROCEDURE — 4040F PNEUMOC VAC/ADMIN/RCVD: CPT | Performed by: FAMILY MEDICINE

## 2018-04-30 PROCEDURE — 3017F COLORECTAL CA SCREEN DOC REV: CPT | Performed by: FAMILY MEDICINE

## 2018-04-30 PROCEDURE — G8599 NO ASA/ANTIPLAT THER USE RNG: HCPCS | Performed by: FAMILY MEDICINE

## 2018-04-30 PROCEDURE — G8427 DOCREV CUR MEDS BY ELIG CLIN: HCPCS | Performed by: FAMILY MEDICINE

## 2018-04-30 PROCEDURE — G8926 SPIRO NO PERF OR DOC: HCPCS | Performed by: FAMILY MEDICINE

## 2018-04-30 PROCEDURE — 3023F SPIROM DOC REV: CPT | Performed by: FAMILY MEDICINE

## 2018-04-30 PROCEDURE — 1123F ACP DISCUSS/DSCN MKR DOCD: CPT | Performed by: FAMILY MEDICINE

## 2018-04-30 PROCEDURE — G8419 CALC BMI OUT NRM PARAM NOF/U: HCPCS | Performed by: FAMILY MEDICINE

## 2018-04-30 RX ORDER — LOSARTAN POTASSIUM 50 MG/1
50 TABLET ORAL DAILY
Qty: 30 TABLET | Refills: 11 | Status: SHIPPED | OUTPATIENT
Start: 2018-04-30 | End: 2018-01-01 | Stop reason: DRUGHIGH

## 2018-05-23 ENCOUNTER — NURSE ONLY (OUTPATIENT)
Dept: FAMILY MEDICINE CLINIC | Age: 73
End: 2018-05-23

## 2018-05-23 DIAGNOSIS — E55.9 VITAMIN D DEFICIENCY: ICD-10-CM

## 2018-05-23 DIAGNOSIS — R73.9 HYPERGLYCEMIA: ICD-10-CM

## 2018-05-23 DIAGNOSIS — Z12.5 PROSTATE CANCER SCREENING: ICD-10-CM

## 2018-05-23 DIAGNOSIS — I25.10 CORONARY ARTERY DISEASE DUE TO LIPID RICH PLAQUE: ICD-10-CM

## 2018-05-23 DIAGNOSIS — I25.83 CORONARY ARTERY DISEASE DUE TO LIPID RICH PLAQUE: ICD-10-CM

## 2018-05-23 DIAGNOSIS — E78.2 MIXED HYPERLIPIDEMIA: ICD-10-CM

## 2018-05-23 PROCEDURE — 36415 COLL VENOUS BLD VENIPUNCTURE: CPT | Performed by: FAMILY MEDICINE

## 2018-05-24 LAB
A/G RATIO: 2.3 (ref 1.1–2.2)
ALBUMIN SERPL-MCNC: 4.8 G/DL (ref 3.4–5)
ALP BLD-CCNC: 97 U/L (ref 40–129)
ALT SERPL-CCNC: 12 U/L (ref 10–40)
ANION GAP SERPL CALCULATED.3IONS-SCNC: 15 MMOL/L (ref 3–16)
AST SERPL-CCNC: 14 U/L (ref 15–37)
BILIRUB SERPL-MCNC: 0.6 MG/DL (ref 0–1)
BUN BLDV-MCNC: 15 MG/DL (ref 7–20)
CALCIUM SERPL-MCNC: 10 MG/DL (ref 8.3–10.6)
CHLORIDE BLD-SCNC: 96 MMOL/L (ref 99–110)
CHOLESTEROL, TOTAL: 161 MG/DL (ref 0–199)
CO2: 28 MMOL/L (ref 21–32)
CREAT SERPL-MCNC: 0.8 MG/DL (ref 0.8–1.3)
ESTIMATED AVERAGE GLUCOSE: 116.9 MG/DL
GFR AFRICAN AMERICAN: >60
GFR NON-AFRICAN AMERICAN: >60
GLOBULIN: 2.1 G/DL
GLUCOSE BLD-MCNC: 102 MG/DL (ref 70–99)
HBA1C MFR BLD: 5.7 %
HDLC SERPL-MCNC: 67 MG/DL (ref 40–60)
LDL CHOLESTEROL CALCULATED: 77 MG/DL
POTASSIUM SERPL-SCNC: 4.7 MMOL/L (ref 3.5–5.1)
PROSTATE SPECIFIC ANTIGEN: 0.52 NG/ML (ref 0–4)
SODIUM BLD-SCNC: 139 MMOL/L (ref 136–145)
TOTAL PROTEIN: 6.9 G/DL (ref 6.4–8.2)
TRIGL SERPL-MCNC: 85 MG/DL (ref 0–150)
VITAMIN D 25-HYDROXY: 21.9 NG/ML
VLDLC SERPL CALC-MCNC: 17 MG/DL

## 2018-05-25 DIAGNOSIS — E55.9 VITAMIN D DEFICIENCY: Primary | ICD-10-CM

## 2018-06-07 ENCOUNTER — TELEPHONE (OUTPATIENT)
Dept: FAMILY MEDICINE CLINIC | Age: 73
End: 2018-06-07

## 2018-06-19 ENCOUNTER — OFFICE VISIT (OUTPATIENT)
Dept: FAMILY MEDICINE CLINIC | Age: 73
End: 2018-06-19

## 2018-06-19 VITALS
DIASTOLIC BLOOD PRESSURE: 86 MMHG | HEIGHT: 64 IN | WEIGHT: 168.2 LBS | HEART RATE: 74 BPM | BODY MASS INDEX: 28.71 KG/M2 | OXYGEN SATURATION: 95 % | SYSTOLIC BLOOD PRESSURE: 132 MMHG

## 2018-06-19 DIAGNOSIS — M51.36 DEGENERATION OF INTERVERTEBRAL DISC OF LUMBAR REGION: ICD-10-CM

## 2018-06-19 DIAGNOSIS — G89.4 CHRONIC PAIN SYNDROME: ICD-10-CM

## 2018-06-19 DIAGNOSIS — M47.817 FACET JOINT DISEASE OF LUMBOSACRAL REGION: ICD-10-CM

## 2018-06-19 DIAGNOSIS — F32.9 REACTIVE DEPRESSION: ICD-10-CM

## 2018-06-19 DIAGNOSIS — M54.41 CHRONIC MIDLINE LOW BACK PAIN WITH BILATERAL SCIATICA: ICD-10-CM

## 2018-06-19 DIAGNOSIS — G89.29 CHRONIC MIDLINE LOW BACK PAIN WITH BILATERAL SCIATICA: ICD-10-CM

## 2018-06-19 DIAGNOSIS — S32.000S COMPRESSION FRACTURE OF LUMBAR VERTEBRA, SEQUELA: Chronic | ICD-10-CM

## 2018-06-19 DIAGNOSIS — F41.9 ANXIETY: Primary | ICD-10-CM

## 2018-06-19 DIAGNOSIS — M54.17 LUMBOSACRAL RADICULOPATHY: ICD-10-CM

## 2018-06-19 DIAGNOSIS — M54.42 CHRONIC MIDLINE LOW BACK PAIN WITH BILATERAL SCIATICA: ICD-10-CM

## 2018-06-19 DIAGNOSIS — M15.3 POST-TRAUMATIC OSTEOARTHRITIS OF MULTIPLE JOINTS: ICD-10-CM

## 2018-06-19 DIAGNOSIS — F51.01 PRIMARY INSOMNIA: ICD-10-CM

## 2018-06-19 PROCEDURE — G8419 CALC BMI OUT NRM PARAM NOF/U: HCPCS | Performed by: FAMILY MEDICINE

## 2018-06-19 PROCEDURE — 4004F PT TOBACCO SCREEN RCVD TLK: CPT | Performed by: FAMILY MEDICINE

## 2018-06-19 PROCEDURE — 4040F PNEUMOC VAC/ADMIN/RCVD: CPT | Performed by: FAMILY MEDICINE

## 2018-06-19 PROCEDURE — 3017F COLORECTAL CA SCREEN DOC REV: CPT | Performed by: FAMILY MEDICINE

## 2018-06-19 PROCEDURE — G8599 NO ASA/ANTIPLAT THER USE RNG: HCPCS | Performed by: FAMILY MEDICINE

## 2018-06-19 PROCEDURE — 1123F ACP DISCUSS/DSCN MKR DOCD: CPT | Performed by: FAMILY MEDICINE

## 2018-06-19 PROCEDURE — G8427 DOCREV CUR MEDS BY ELIG CLIN: HCPCS | Performed by: FAMILY MEDICINE

## 2018-06-19 PROCEDURE — 99214 OFFICE O/P EST MOD 30 MIN: CPT | Performed by: FAMILY MEDICINE

## 2018-06-19 RX ORDER — DULOXETIN HYDROCHLORIDE 60 MG/1
60 CAPSULE, DELAYED RELEASE ORAL 2 TIMES DAILY
Qty: 60 CAPSULE | Refills: 11 | Status: SHIPPED | OUTPATIENT
Start: 2018-06-19 | End: 2019-01-01 | Stop reason: DRUGHIGH

## 2018-06-19 RX ORDER — BUSPIRONE HYDROCHLORIDE 10 MG/1
10 TABLET ORAL 3 TIMES DAILY
Qty: 30 TABLET | Refills: 0 | Status: SHIPPED | OUTPATIENT
Start: 2018-06-19 | End: 2018-01-01 | Stop reason: SDUPTHER

## 2018-06-19 RX ORDER — ERGOCALCIFEROL 1.25 MG/1
CAPSULE ORAL
Refills: 11 | COMMUNITY
Start: 2018-05-25 | End: 2018-06-19 | Stop reason: SDUPTHER

## 2018-06-21 DIAGNOSIS — R09.81 CHRONIC NASAL CONGESTION: Chronic | ICD-10-CM

## 2018-06-21 DIAGNOSIS — J32.4 CHRONIC PANSINUSITIS: ICD-10-CM

## 2018-06-21 RX ORDER — FLUTICASONE PROPIONATE 50 MCG
SPRAY, SUSPENSION (ML) NASAL
Qty: 16 G | Refills: 11 | Status: SHIPPED | OUTPATIENT
Start: 2018-06-21 | End: 2019-01-01 | Stop reason: SDUPTHER

## 2018-07-21 ENCOUNTER — HOSPITAL ENCOUNTER (EMERGENCY)
Age: 73
Discharge: HOME | End: 2018-07-21
Attending: EMERGENCY MEDICINE
Payer: MEDICARE

## 2018-07-21 ENCOUNTER — APPOINTMENT (OUTPATIENT)
Dept: GENERAL RADIOLOGY | Age: 73
End: 2018-07-21
Payer: MEDICARE

## 2018-07-21 VITALS
OXYGEN SATURATION: 100 % | HEIGHT: 64 IN | BODY MASS INDEX: 29.88 KG/M2 | WEIGHT: 175 LBS | SYSTOLIC BLOOD PRESSURE: 144 MMHG | HEART RATE: 103 BPM | RESPIRATION RATE: 26 BRPM | TEMPERATURE: 97.6 F | DIASTOLIC BLOOD PRESSURE: 88 MMHG

## 2018-07-21 DIAGNOSIS — J44.1 COPD EXACERBATION (HCC): Primary | ICD-10-CM

## 2018-07-21 LAB
A/G RATIO: 1.3 (ref 1.1–2.2)
ALBUMIN SERPL-MCNC: 4.4 G/DL (ref 3.4–5)
ALP BLD-CCNC: 129 U/L (ref 40–129)
ALT SERPL-CCNC: 13 U/L (ref 10–40)
ANION GAP SERPL CALCULATED.3IONS-SCNC: 11 MMOL/L (ref 3–16)
AST SERPL-CCNC: 15 U/L (ref 15–37)
BASOPHILS ABSOLUTE: 0 K/UL (ref 0–0.2)
BASOPHILS RELATIVE PERCENT: 0.3 %
BILIRUB SERPL-MCNC: 0.4 MG/DL (ref 0–1)
BUN BLDV-MCNC: 16 MG/DL (ref 7–20)
CALCIUM SERPL-MCNC: 10 MG/DL (ref 8.3–10.6)
CHLORIDE BLD-SCNC: 100 MMOL/L (ref 99–110)
CO2: 32 MMOL/L (ref 21–32)
CREAT SERPL-MCNC: 0.8 MG/DL (ref 0.8–1.3)
EOSINOPHILS ABSOLUTE: 0.1 K/UL (ref 0–0.6)
EOSINOPHILS RELATIVE PERCENT: 0.7 %
GFR AFRICAN AMERICAN: >60
GFR NON-AFRICAN AMERICAN: >60
GLOBULIN: 3.4 G/DL
GLUCOSE BLD-MCNC: 109 MG/DL (ref 70–99)
HCT VFR BLD CALC: 44.6 % (ref 40.5–52.5)
HEMOGLOBIN: 14.8 G/DL (ref 13.5–17.5)
LYMPHOCYTES ABSOLUTE: 1.3 K/UL (ref 1–5.1)
LYMPHOCYTES RELATIVE PERCENT: 10.7 %
MCH RBC QN AUTO: 33.6 PG (ref 26–34)
MCHC RBC AUTO-ENTMCNC: 33.1 G/DL (ref 31–36)
MCV RBC AUTO: 101.3 FL (ref 80–100)
MONOCYTES ABSOLUTE: 0.9 K/UL (ref 0–1.3)
MONOCYTES RELATIVE PERCENT: 7.4 %
NEUTROPHILS ABSOLUTE: 9.6 K/UL (ref 1.7–7.7)
NEUTROPHILS RELATIVE PERCENT: 80.9 %
PDW BLD-RTO: 13.6 % (ref 12.4–15.4)
PLATELET # BLD: 276 K/UL (ref 135–450)
PMV BLD AUTO: 8.2 FL (ref 5–10.5)
POTASSIUM SERPL-SCNC: 4.7 MMOL/L (ref 3.5–5.1)
RBC # BLD: 4.4 M/UL (ref 4.2–5.9)
SODIUM BLD-SCNC: 143 MMOL/L (ref 136–145)
TOTAL PROTEIN: 7.8 G/DL (ref 6.4–8.2)
WBC # BLD: 11.9 K/UL (ref 4–11)

## 2018-07-21 PROCEDURE — 36415 COLL VENOUS BLD VENIPUNCTURE: CPT

## 2018-07-21 PROCEDURE — 80053 COMPREHEN METABOLIC PANEL: CPT

## 2018-07-21 PROCEDURE — 85025 COMPLETE CBC W/AUTO DIFF WBC: CPT

## 2018-07-21 PROCEDURE — 99285 EMERGENCY DEPT VISIT HI MDM: CPT

## 2018-07-21 PROCEDURE — 71046 X-RAY EXAM CHEST 2 VIEWS: CPT

## 2018-07-21 PROCEDURE — 96374 THER/PROPH/DIAG INJ IV PUSH: CPT

## 2018-07-21 PROCEDURE — 6360000002 HC RX W HCPCS: Performed by: EMERGENCY MEDICINE

## 2018-07-21 PROCEDURE — 6370000000 HC RX 637 (ALT 250 FOR IP): Performed by: EMERGENCY MEDICINE

## 2018-07-21 RX ORDER — METHYLPREDNISOLONE SODIUM SUCCINATE 125 MG/2ML
125 INJECTION, POWDER, LYOPHILIZED, FOR SOLUTION INTRAMUSCULAR; INTRAVENOUS ONCE
Status: COMPLETED | OUTPATIENT
Start: 2018-07-21 | End: 2018-07-21

## 2018-07-21 RX ORDER — DOXYCYCLINE HYCLATE 100 MG
100 TABLET ORAL ONCE
Status: COMPLETED | OUTPATIENT
Start: 2018-07-21 | End: 2018-07-21

## 2018-07-21 RX ORDER — PREDNISONE 50 MG/1
50 TABLET ORAL DAILY
Qty: 5 TABLET | Refills: 0 | Status: SHIPPED | OUTPATIENT
Start: 2018-07-21 | End: 2018-07-26

## 2018-07-21 RX ORDER — ALBUTEROL SULFATE 2.5 MG/3ML
2.5 SOLUTION RESPIRATORY (INHALATION) ONCE
Status: COMPLETED | OUTPATIENT
Start: 2018-07-21 | End: 2018-07-21

## 2018-07-21 RX ORDER — DOXYCYCLINE HYCLATE 100 MG
100 TABLET ORAL 2 TIMES DAILY
Qty: 20 TABLET | Refills: 0 | Status: SHIPPED | OUTPATIENT
Start: 2018-07-21 | End: 2018-07-31

## 2018-07-21 RX ORDER — IPRATROPIUM BROMIDE AND ALBUTEROL SULFATE 2.5; .5 MG/3ML; MG/3ML
1 SOLUTION RESPIRATORY (INHALATION) ONCE
Status: COMPLETED | OUTPATIENT
Start: 2018-07-21 | End: 2018-07-21

## 2018-07-21 RX ADMIN — IPRATROPIUM BROMIDE AND ALBUTEROL SULFATE 1 AMPULE: .5; 3 SOLUTION RESPIRATORY (INHALATION) at 16:55

## 2018-07-21 RX ADMIN — DOXYCYCLINE HYCLATE 100 MG: 100 TABLET, COATED ORAL at 18:20

## 2018-07-21 RX ADMIN — ALBUTEROL SULFATE 2.5 MG: 2.5 SOLUTION RESPIRATORY (INHALATION) at 16:55

## 2018-07-21 RX ADMIN — METHYLPREDNISOLONE SODIUM SUCCINATE 125 MG: 125 INJECTION, POWDER, FOR SOLUTION INTRAMUSCULAR; INTRAVENOUS at 16:55

## 2018-07-21 ASSESSMENT — ENCOUNTER SYMPTOMS
CHEST TIGHTNESS: 1
NAUSEA: 0
VOMITING: 0
DIARRHEA: 0
SORE THROAT: 0
COUGH: 1
SHORTNESS OF BREATH: 1
ABDOMINAL PAIN: 0

## 2018-07-21 NOTE — ED PROVIDER NOTES
Bob Murillo is a 67 y.o. male presents with SOB onset 2 weeks that is described as chest tight ness. He was seen on the 2nd and had an EKG and lab work. He does have productive sputum occassionally. He has an inhaler that he has used about 6-8 times. He has also used a nebulizer once today. Denies fevers, chills, n/v/d. He is a smoker. He refuses to have an EKG preformed. Patient gives permission for family/companions to be present during questioning, answers and results during their emergency room visit. HPI    Review of Systems   Constitutional: Negative for chills and fever. HENT: Positive for congestion. Negative for sore throat. Eyes: Negative for visual disturbance. Respiratory: Positive for cough, chest tightness and shortness of breath. Cardiovascular: Negative for chest pain. Gastrointestinal: Negative for abdominal pain, diarrhea, nausea and vomiting. Genitourinary: Negative for difficulty urinating. Musculoskeletal: Negative for neck pain. Skin: Negative for rash and wound. Neurological: Negative for headaches. PAST MEDICAL HISTORY   has a past medical history of Amputation finger; Ankle fracture, left; Chronic nasal congestion; Chronic pain syndrome; Compression fracture of lumbar vertebra (HCC); COPD (chronic obstructive pulmonary disease) ([address] DDD (degenerative disc disease); Depression; Facet joint disease of lumbosacral region; Hyperglycemia; Hyperlipidemia; Insomnia; Radiculopathy; and Smoker's respiratory syndrome. PAST SURGICAL HISTORY   has a past surgical history that includes hernia repair; amputation; and Colonoscopy (05/02/2016). FAMILY HISTORY  family history includes Cancer in his mother; Diabetes in his father; Heart Disease in his sister. SOCIAL HISTORY   reports that he has been smoking Cigarettes. He has a 50.00 pack-year smoking history.  He has never used smokeless tobacco. He reports that he does not drink alcohol or use He is oriented to person, place, and time. He appears well-developed. No distress. HENT:   Head: Normocephalic and atraumatic. Right Ear: Tympanic membrane and external ear normal.   Left Ear: Tympanic membrane and external ear normal.   Mouth/Throat: Oropharynx is clear and moist. No oropharyngeal exudate. Eyes: Conjunctivae and EOM are normal. Pupils are equal, round, and reactive to light. Neck: Normal range of motion. Neck supple. Cardiovascular: Normal rate, regular rhythm, normal heart sounds and intact distal pulses. Exam reveals no gallop and no friction rub. No murmur heard. Pulmonary/Chest: Effort normal. No stridor. He has wheezes. Breath sounds are diminished throughout with expiratory wheezes but no rales rhonchi retractions or stridor. Abdominal: Soft. Bowel sounds are normal. There is no tenderness. There is no rigidity, no rebound and no guarding. Musculoskeletal: Normal range of motion. He exhibits no edema, tenderness or deformity. Neurological: He is alert and oriented to person, place, and time. He has normal strength. No cranial nerve deficit or sensory deficit. He exhibits normal muscle tone. Coordination normal. GCS eye subscore is 4. GCS verbal subscore is 5. GCS motor subscore is 6. Skin: Skin is warm and dry. No rash noted. He is not diaphoretic. Psychiatric: He has a normal mood and affect. His speech is normal and behavior is normal. Thought content normal. Cognition and memory are normal.   Vitals reviewed.       Procedures    MDM      Labs    Results for orders placed or performed during the hospital encounter of 07/21/18   CBC Auto Differential   Result Value Ref Range    WBC 11.9 (H) 4.0 - 11.0 K/uL    RBC 4.40 4.20 - 5.90 M/uL    Hemoglobin 14.8 13.5 - 17.5 g/dL    Hematocrit 44.6 40.5 - 52.5 %    .3 (H) 80.0 - 100.0 fL    MCH 33.6 26.0 - 34.0 pg    MCHC 33.1 31.0 - 36.0 g/dL    RDW 13.6 12.4 - 15.4 %    Platelets 412 520 - 049 K/uL    MPV 8.2 5.0 - 10.5 fL    Neutrophils % 80.9 %    Lymphocytes % 10.7 %    Monocytes % 7.4 %    Eosinophils % 0.7 %    Basophils % 0.3 %    Neutrophils # 9.6 (H) 1.7 - 7.7 K/uL    Lymphocytes # 1.3 1.0 - 5.1 K/uL    Monocytes # 0.9 0.0 - 1.3 K/uL    Eosinophils # 0.1 0.0 - 0.6 K/uL    Basophils # 0.0 0.0 - 0.2 K/uL   Comprehensive Metabolic Panel   Result Value Ref Range    Sodium 143 136 - 145 mmol/L    Potassium 4.7 3.5 - 5.1 mmol/L    Chloride 100 99 - 110 mmol/L    CO2 32 21 - 32 mmol/L    Anion Gap 11 3 - 16    Glucose 109 (H) 70 - 99 mg/dL    BUN 16 7 - 20 mg/dL    CREATININE 0.8 0.8 - 1.3 mg/dL    GFR Non-African American >60 >60    GFR African American >60 >60    Calcium 10.0 8.3 - 10.6 mg/dL    Total Protein 7.8 6.4 - 8.2 g/dL    Alb 4.4 3.4 - 5.0 g/dL    Albumin/Globulin Ratio 1.3 1.1 - 2.2    Total Bilirubin 0.4 0.0 - 1.0 mg/dL    Alkaline Phosphatase 129 40 - 129 U/L    ALT 13 10 - 40 U/L    AST 15 15 - 37 U/L    Globulin 3.4 g/dL       Radiology  The following radiographic studies were viewed by myself. Radiologist's interpretation:    Xr Chest Standard (2 Vw)    Result Date: 7/21/2018  EXAMINATION: TWO VIEWS OF THE CHEST 7/21/2018 1:45 pm COMPARISON: 07/02/2018 HISTORY: ORDERING SYSTEM PROVIDED HISTORY: sob TECHNOLOGIST PROVIDED HISTORY: Reason for exam:->sob Ordering Physician Provided Reason for Exam: cough,congestion,sob x 2 wks, hx copd Acuity: Acute Type of Exam: Ongoing FINDINGS: The lungs appear hyperinflated with depression of the hemidiaphragms. No focal infiltrate is found. No pneumothorax. No free air. No acute bony abnormality. Multiple thoracic compression fractures are re- demonstrated, grossly unchanged. No acute infiltrate. Continued evidence of hyperinflation, suggesting obstructive lung disease. Emergency Department Course:  I spoke with the patient and he is adamant that he does not want an EKG done he does not want his heart looked at.   He states he's has this done before the symptoms are similar to his previous exacerbations of COPD. He initially placed the nurses spoke with him declining any workup at all and wanted a prescription for antibiotics and steroids. I discussed with the patient that I would not provide him of steroids and antibiotics but I would have him sign out AMA and he states that he is willing to be evaluated as long as we don't do an EKG and labs first heart. Patient will consent to steroids and IV and breathing treatments. 6:09 PM  Patient walks to and from the bathroom without any distress. Reexamination shows that he has persistent expiratory wheezes but increased aeration. Patient is offered a third breathing treatment but he adamantly declines he states he has breathing treatments at home and can do this at home he is awake alert oriented and cooperative but declines any further workup or treatment at this time. I did discuss with him at length the consequences of COPD including morbidity and mortality and encouraged him to return for any worsening of his symptoms. I saw him with front of  with his consent and encouraged a  also to keep an eye on him and encouraged to return for any problems. Patient states he will use his nebulizer and his inhalers home we will take his steroid and antibiotic. And he will follow-up with his doctor on Monday. He again is adamant that he does not wish to be admitted he were wants no further treatment here again he is awake alert oriented cooperative understands risks of COPD and the benefits of further treatment and admission and declines it. He is encouraged again to return and states that he will. Discussed results with patient and family. He is awake alert and oriented and declines to have a third breathing treatment and admission. 6:21 PM:  Discussed results, diagnosis and plan with patient and/or family. Questions addressed. Disposition and follow-up agreed upon.   Specific discharge

## 2018-07-21 NOTE — ED NOTES
Pts femal family member walked out to the nurses station and said \"We've been here for about two hours and all you have done is take his blood pressure. If you aren't going to give us what we want we are leaving. \" This nurse explained the ER pt volume acuity and volume were high and the dr was currently doing a procedure on a pt in another room and would be with them as soon as she could. Pts family member then walked back to the pts  and was overheard saying loudly to pt  \"There's all kind of people just sitting here and doing nothing. \" MD made aware     Ritu Todd, RN  07/21/18 [id number]

## 2018-07-21 NOTE — ED NOTES
Pt refusing cardiac monitoring, chest XR or IV/ bloodwork, states \"I just need an antibiotic, not all of that other stuff. \" MD made aware     Yusuf Hawthorne, RN  07/21/18 [id number]

## 2018-07-23 NOTE — TELEPHONE ENCOUNTER
Pt called and stated that over the weekend became ill with URI and was given meds. However, this pt has been short of breathe and is having a hard time getting his oxygen from [address] in [place]. Pt asked if Tita Keller or the pcp can help him out. Please Advise.  Thank You

## 2018-07-27 ENCOUNTER — OFFICE VISIT (OUTPATIENT)
Dept: FAMILY MEDICINE CLINIC | Age: 73
End: 2018-07-27

## 2018-07-27 VITALS
BODY MASS INDEX: 29.01 KG/M2 | WEIGHT: 169 LBS | OXYGEN SATURATION: 94 % | HEART RATE: 98 BPM | DIASTOLIC BLOOD PRESSURE: 90 MMHG | SYSTOLIC BLOOD PRESSURE: 162 MMHG

## 2018-07-27 DIAGNOSIS — J41.1 MUCOPURULENT CHRONIC BRONCHITIS (HCC): ICD-10-CM

## 2018-07-27 DIAGNOSIS — S68.119S AMPUTATION FINGER, SEQUELA (HCC): ICD-10-CM

## 2018-07-27 DIAGNOSIS — J44.1 COPD EXACERBATION (HCC): Primary | ICD-10-CM

## 2018-07-27 DIAGNOSIS — R09.02 HYPOXIA: ICD-10-CM

## 2018-07-27 PROCEDURE — G8599 NO ASA/ANTIPLAT THER USE RNG: HCPCS | Performed by: FAMILY MEDICINE

## 2018-07-27 PROCEDURE — 4004F PT TOBACCO SCREEN RCVD TLK: CPT | Performed by: FAMILY MEDICINE

## 2018-07-27 PROCEDURE — G8427 DOCREV CUR MEDS BY ELIG CLIN: HCPCS | Performed by: FAMILY MEDICINE

## 2018-07-27 PROCEDURE — 4040F PNEUMOC VAC/ADMIN/RCVD: CPT | Performed by: FAMILY MEDICINE

## 2018-07-27 PROCEDURE — G8419 CALC BMI OUT NRM PARAM NOF/U: HCPCS | Performed by: FAMILY MEDICINE

## 2018-07-27 PROCEDURE — 1101F PT FALLS ASSESS-DOCD LE1/YR: CPT | Performed by: FAMILY MEDICINE

## 2018-07-27 PROCEDURE — G8926 SPIRO NO PERF OR DOC: HCPCS | Performed by: FAMILY MEDICINE

## 2018-07-27 PROCEDURE — 3017F COLORECTAL CA SCREEN DOC REV: CPT | Performed by: FAMILY MEDICINE

## 2018-07-27 PROCEDURE — 3023F SPIROM DOC REV: CPT | Performed by: FAMILY MEDICINE

## 2018-07-27 PROCEDURE — 1123F ACP DISCUSS/DSCN MKR DOCD: CPT | Performed by: FAMILY MEDICINE

## 2018-07-27 PROCEDURE — 99213 OFFICE O/P EST LOW 20 MIN: CPT | Performed by: FAMILY MEDICINE

## 2018-07-27 NOTE — PATIENT INSTRUCTIONS
Please start using the HHN machine at least 3 times a day and just use the Pro air in between the [facility] treatments as needed and when away from home, increase the [place] and Symbicort as directed. Patient should call the office immediately with new or ongoing signs or symptoms or worsening, or proceed to the emergency room. If you are on medications which could impair your senses, you are at risk of weakness, falls, dizziness, or drowsiness. You should be careful during activities which could place you at risk of harm, such as climbing, using stairs, operating machinery, or driving vehicles. If you feel you cannot safely do these activities, you should request others to help you, or avoid the activities altogether. If you are drowsy for any other reason, you should use the same precautions as listed above.

## 2018-07-27 NOTE — PROGRESS NOTES
Years of education: N/A     Occupational History    Not on file. Social History Main Topics    Smoking status: Current Some Day Smoker     Packs/day: 2.00     Years: 25.00     Types: Cigarettes    Smokeless tobacco: Never Used    Alcohol use No      Comment: occasional    Drug use: No    Sexual activity: Not Currently     Other Topics Concern    Not on file     Social History Narrative    No narrative on file       Prior to Visit Medications    Medication Sig Taking? Authorizing Provider   doxycycline hyclate (VIBRA-TABS) 100 MG tablet Take 1 tablet by mouth 2 times daily for 10 days Yes Sudha Landis MD   fluticasone (FLONASE) 50 MCG/ACT nasal spray USE 2 SPRAYS IN EACH NOSTRIL DAILY Yes SAKSHI Roberts CNP   busPIRone (BUSPAR) 10 MG tablet Take 1 tablet by mouth 3 times daily Yes Gretchen Ortega MD   DULoxetine (CYMBALTA) 60 MG extended release capsule Take 1 capsule by mouth 2 times daily Yes Gretchen Ortega MD   vitamin D (CHOLECALCIFEROL) 13431 UNIT CAPS Take 1 capsule by mouth once a week Yes Gretchen Ortega MD   aclidinium (TUDORZA PRESSAIR) 400 MCG/ACT AEPB inhaler Inhale 1 puff into the lungs 2 times daily Yes Gretchen Ortega MD   losartan (COZAAR) 50 MG tablet Take 1 tablet by mouth daily Yes Gretchen Ortega MD   SYMBICORT 160-4.5 MCG/ACT AERO INHALE 2 PUFFS INTO THE LUNGS 2 TIMES DAILY Yes Gretchen Ortega MD   oxyCODONE-acetaminophen (PERCOCET)  MG per tablet Take by mouth .  Yes Historical Provider, MD   PROAIR  (90 Base) MCG/ACT inhaler INHALE 2 PUFFS BY MOUTH 4 TIMES DAILY WITH AWAY FROM HOME Yes Gretchen Ortega MD   OXYCONTIN 15 MG T12A extended release tablet  Yes Historical Provider, MD   ipratropium-albuterol (DUONEB) 0.5-2.5 (3) MG/3ML SOLN nebulizer solution Use qid Yes Gretchen Ortega MD   atorvastatin (LIPITOR) 40 MG tablet Take 1 tablet by mouth daily Yes Miko Pate [place] MD   Respiratory Therapy Supplies (NEBULIZER COMPRESSOR) KIT 1 kit by Does not apply route once for 1 dose  Kiarra Oh MD     No Known Allergies    OBJECTIVE:  Estimated body mass index is 29.01 kg/m² as calculated from the following:    Height as of 7/21/18: 5' 4\" (1.626 m). Weight as of this encounter: 169 lb (76.7 kg). Vitals:    07/27/18 1622 07/27/18 1628   BP: (!) 178/100 (!) 162/90   Site: Left Arm Left Arm   Position: Sitting Sitting   Cuff Size: Medium Adult Medium Adult   Pulse: 98    SpO2: 94%    Weight: 169 lb (76.7 kg)      BP Readings from Last 2 Encounters:   07/27/18 (!) 162/90   07/21/18 (!) 144/88     Wt Readings from Last 3 Encounters:   07/27/18 169 lb (76.7 kg)   07/21/18 175 lb (79.4 kg)   07/02/18 170 lb (77.1 kg)       Physical Exam   Constitutional: He appears well-developed and well-nourished. No distress. HENT:   Head: Normocephalic and atraumatic. Right Ear: External ear normal.   Left Ear: External ear normal.   Nose: Nose normal.   Lips symmetric   Eyes: Lids are normal. Pupils are equal, round, and reactive to light. Right eye exhibits no discharge. Left eye exhibits no discharge. No scleral icterus. Neck: No JVD present. No thyromegaly present. Cardiovascular: Normal rate, regular rhythm and normal heart sounds. Pulmonary/Chest: Effort normal. No accessory muscle usage. No respiratory distress. He has decreased breath sounds in the right upper field, the right middle field, the right lower field, the left upper field, the left middle field and the left lower field. He has no wheezes. He has no rhonchi. He has no rales. Abdominal: Soft. There is no hepatosplenomegaly. There is no tenderness. Musculoskeletal: He exhibits no edema. Skin: Skin is warm and dry. No rash noted. He is not diaphoretic. No erythema. No pallor. Turgor normal   Psychiatric: He has a normal mood and affect.  His behavior is normal. Judgment and thought content normal. Nursing note and vitals reviewed. Records from the emergency room reviewed     ASSESSMENT PLAN      Diagnosis Orders   1. COPD exacerbation ([facility] [address]     2. Hypoxia     3. Mucopurulent chronic bronchitis ([address]      patient is reminded to start using Symbicort routinely twice a day. Also to use TUDORZA twice a day. Recommend nebulizer with DuoNeb 3 times a day routinely. Use pro-air when necessary. We believe he is due for both daytime and nighttime oxygen. His daughter will try to find out why he is being charged. He will follow-up his regular scheduled. Patient should call the office immediately with new or ongoing signs or symptoms or worsening, or proceed to the emergency room. No changes in past medical history, past surgical history, social history, or family history were noted during the patient encounter unless specifically listed above. All updates of past medical history, past surgical history, social history, or family history were reviewed personally by me during the office visit. All problems listed in the assessment are stable unless noted otherwise. Medication profile reviewed personally by me during the office visit. Medication side effects and possible impairments from medications were discussed as applicable. This document was prepared by a combination of typing and transcription through a voice recognition software. Scribe attestation: Leyla Arias RN, am scribing for and in the presence of Alix Romero MD. Electronically signed by Marline Chávez RN on 7/27/2018 at 4:28 PM      Provider attestation:     I, Dr. Anna Sainz, personally performed the services described in this documentation, as scribed by the above signed scribe in my presence, and it is both accurate and complete.  I agree with the ROS and Past Histories independently gathered by the clinical support staff and the remaining scribed note accurately describes my personal service to the patient.       7/27/2018    4:55 PM

## 2018-09-13 NOTE — PROGRESS NOTES
[facility]  [facility] [place] [address]  Dept: [phone]  Dept Fax: [phone]    Johan Pablo is a 67 y.o. male who presents today for his medical conditions/complaints as noted below. Johan Pablo is c/o of Other (pt states he fell into a 10 ft hole 3 weeks ago. pt was seen at [facility]. he was discharged that night with cuts and bruises. later that night after he was discharged and home he noticed his left hip was bruised. 5-6 days later he noticed his left hip also had a knot. it is not painful to touch. pt was concerned because it has not changed.)        HPI:     Chief Complaint   Patient presents with    Other     pt states he fell into a 10 ft hole 3 weeks ago. pt was seen at [facility]. he was discharged that night with cuts and bruises. later that night after he was discharged and home he noticed his left hip was bruised. 5-6 days later he noticed his left hip also had a knot. it is not painful to touch. pt was concerned because it has not changed. HPI    Mr. Nano Darden presents to the office today as he fell into a 10 foot hole 3 weeks ago and hurt his left hip. He went to [facility] after his fall where he had x-rays of his bilateral arms which he states was negative. He was discharged that night and went he went home. He noticed that night he had a large bruise on his left hip. He noticed 5 to 6 days later a large knot on his left hip. He states it has not improved and it is painful to touch. He states the site is sore to touch. He is able to walk without difficulty. Mr. Nano Darden state he has noticed his blood pressure has been elevated for many months. He admits to taking his blood pressure medication daily. He denies any chest pain, shortness of breath, palpitations.      Past Medical History:   Diagnosis Date    Amputation finger     Ankle fracture, left     Chronic nasal congestion     Chronic pain syndrome     Compression fracture of lumbar vertebra (HCC)     COPD (chronic obstructive pulmonary disease) (HCC)     DDD (degenerative disc disease)     Depression     Facet joint disease of lumbosacral region ([address]     Hyperglycemia     Hyperlipidemia     Insomnia     Radiculopathy     Smoker's respiratory syndrome       Past Surgical History:   Procedure Laterality Date    AMPUTATION      COLONOSCOPY  05/02/2016    colonic polyps    HERNIA REPAIR         Family History   Problem Relation Age of Onset    Cancer Mother     Diabetes Father     Heart Disease Sister        Social History   Substance Use Topics    Smoking status: Current Some Day Smoker     Packs/day: 2.00     Years: 25.00     Types: Cigarettes    Smokeless tobacco: Never Used    Alcohol use No      Comment: occasional      Current Outpatient Prescriptions   Medication Sig Dispense Refill    losartan (COZAAR) 100 MG tablet Take 1 tablet by mouth daily 30 tablet 5    atorvastatin (LIPITOR) 40 MG tablet TAKE 1 TABLET BY MOUTH DAILY 30 tablet 11    fluticasone (FLONASE) 50 MCG/ACT nasal spray USE 2 SPRAYS IN EACH NOSTRIL DAILY 16 g 11    busPIRone (BUSPAR) 10 MG tablet Take 1 tablet by mouth 3 times daily 30 tablet 0    DULoxetine (CYMBALTA) 60 MG extended release capsule Take 1 capsule by mouth 2 times daily 60 capsule 11    vitamin D (CHOLECALCIFEROL) 06062 UNIT CAPS Take 1 capsule by mouth once a week 4 capsule 11    aclidinium (TUDORZA PRESSAIR) 400 MCG/ACT AEPB inhaler Inhale 1 puff into the lungs 2 times daily 1 each 11    SYMBICORT 160-4.5 MCG/ACT AERO INHALE 2 PUFFS INTO THE LUNGS 2 TIMES DAILY 10.2 g 11    oxyCODONE-acetaminophen (PERCOCET)  MG per tablet Take by mouth .       PROAIR  (90 Base) MCG/ACT inhaler INHALE 2 PUFFS BY MOUTH 4 TIMES DAILY WITH AWAY FROM HOME 8.5 g 11    OXYCONTIN 15 MG T12A extended release tablet   0    ipratropium-albuterol (DUONEB) 0.5-2.5 (3) MG/3ML SOLN nebulizer solution Use qid 120 vial 11    Respiratory Therapy Supplies (NEBULIZER COMPRESSOR) KIT 1 kit by Does not apply route once for 1 dose 1 kit 0     No current facility-administered medications for this visit. No Known Allergies    Subjective:      Review of Systems   Constitutional: Negative. HENT: Negative. Respiratory: Negative. Cardiovascular: Negative. Musculoskeletal: Positive for arthralgias (Left hip), joint swelling (Left hip) and myalgias (Left hip). Negative for back pain, gait problem, neck pain and neck stiffness. Skin: Negative. Neurological: Negative. Psychiatric/Behavioral: Negative. Objective:     Vitals:    09/13/18 1525 09/13/18 1535   BP: (!) 160/90 (!) 158/98   Site: Left Upper Arm Left Upper Arm   Position: Sitting Sitting   Cuff Size: Medium Adult Large Adult   Pulse: 89    SpO2: 96%    Weight: 170 lb (77.1 kg)    Height: 5' 4\" (1.626 m)      Wt Readings from Last 3 Encounters:   09/13/18 170 lb (77.1 kg)   07/27/18 169 lb (76.7 kg)   07/21/18 175 lb (79.4 kg)     Temp Readings from Last 3 Encounters:   07/21/18 97.6 °F (36.4 °C) (Oral)   07/02/18 97.7 °F (36.5 °C) (Oral)   08/27/17 98 °F (36.7 °C) (Oral)     BP Readings from Last 3 Encounters:   09/13/18 (!) 158/98   07/27/18 (!) 162/90   07/21/18 (!) 144/88     Pulse Readings from Last 3 Encounters:   09/13/18 89   07/27/18 98   07/21/18 103     Physical Exam   Constitutional: He is oriented to person, place, and time. He appears well-developed and well-nourished. No distress. HENT:   Head: Normocephalic and atraumatic. Right Ear: External ear normal.   Left Ear: External ear normal.   Nose: Nose normal.   Eyes: Conjunctivae are normal. Right eye exhibits no discharge. Left eye exhibits no discharge. Neck: Normal range of motion. Neck supple. Cardiovascular: Normal rate. Pulmonary/Chest: Effort normal.   Musculoskeletal: Normal range of motion. He exhibits no deformity.    Neurological: He stable  renal function.  GFR  07/21/2018 >60  >60 Final    Comment: Chronic Kidney Disease: less than 60 ml/min/1.73 sq.m. Kidney Failure: less than 15 ml/min/1.73 sq.m. Results valid for patients 18 years and older.  Calcium 07/21/2018 10.0  8.3 - 10.6 mg/dL Final    Total Protein 07/21/2018 7.8  6.4 - 8.2 g/dL Final    Alb 07/21/2018 4.4  3.4 - 5.0 g/dL Final    Albumin/Globulin Ratio 07/21/2018 1.3  1.1 - 2.2 Final    Total Bilirubin 07/21/2018 0.4  0.0 - 1.0 mg/dL Final    Alkaline Phosphatase 07/21/2018 129  40 - 129 U/L Final    ALT 07/21/2018 13  10 - 40 U/L Final    AST 07/21/2018 15  15 - 37 U/L Final    Globulin 07/21/2018 3.4  g/dL Final           Assessment & Plan: The following diagnoses and conditions are stable with no further orders unless indicated:  1. Left hip pain    2. Hematoma    3. Essential hypertension    4. Need for influenza vaccination        Keli Fitzgerald was seen today for other. Increase Losartan 50 mg to 100 mg daily. Recheck in one week. Left hip x-ray ordered. Will reevaluate hematoma in one week. He is to call the office if his pain does not improve. Diagnoses and all orders for this visit:    Left hip pain  -     XR HIP LEFT (1 VIEW)    Hematoma    Essential hypertension  -     losartan (COZAAR) 100 MG tablet; Take 1 tablet by mouth daily    Need for influenza vaccination  -     INFLUENZA, QUADV, 3 YRS AND OLDER, IM, MDV, 0.5ML (5 [facility])      Prior to Visit Medications    Medication Sig Taking?  Authorizing Provider   losartan (COZAAR) 100 MG tablet Take 1 tablet by mouth daily Yes SAKSHI Roberts CNP   atorvastatin (LIPITOR) 40 MG tablet TAKE 1 TABLET BY MOUTH DAILY Yes Gretchen Ortega MD   fluticasone (FLONASE) 50 MCG/ACT nasal spray USE 2 SPRAYS IN EACH NOSTRIL DAILY Yes SAKSHI Roberts CNP   busPIRone (BUSPAR) 10 MG tablet Take 1 tablet by mouth 3 times daily Yes Miko Pate Rodrigo Cast MD   DULoxetine (CYMBALTA) 60 MG extended release capsule Take 1 capsule by mouth 2 times daily Yes Debora Mcleod MD   vitamin D (CHOLECALCIFEROL) 03964 UNIT CAPS Take 1 capsule by mouth once a week Yes Debora Mcleod MD   aclidinium (TUDORZA PRESSAIR) 400 MCG/ACT AEPB inhaler Inhale 1 puff into the lungs 2 times daily Yes Debora Mcleod MD   SYMBICORT 160-4.5 MCG/ACT AERO INHALE 2 PUFFS INTO THE LUNGS 2 TIMES DAILY Yes Debora Mcleod MD   oxyCODONE-acetaminophen (PERCOCET)  MG per tablet Take by mouth . Yes Historical Provider, MD   PROAIR  (90 Base) MCG/ACT inhaler INHALE 2 PUFFS BY MOUTH 4 TIMES DAILY WITH AWAY FROM HOME Yes Debora Mcleod MD   OXYCONTIN 15 MG T12A extended release tablet  Yes Historical Provider, MD   ipratropium-albuterol (DUONEB) 0.5-2.5 (3) MG/3ML SOLN nebulizer solution Use qid Yes Debora Mcleod MD   Respiratory Therapy Supplies (NEBULIZER COMPRESSOR) KIT 1 kit by Does not apply route once for 1 dose  Debora Mcleod MD     Orders Placed This Encounter   Medications    losartan (COZAAR) 100 MG tablet     Sig: Take 1 tablet by mouth daily     Dispense:  30 tablet     Refill:  5         Return in about 7 days (around 9/20/2018) for Left hip pain, BP elevation, eye pain - 40 min . Patient should call the office immediately with new or ongoing signs or symptoms or worsening, or proceed to the emergency room. No changes in past medical history, past surgical history, social history, or family history were noted during the patient encounter unless specifically listed above. All updates of past medical history, past surgical history, social history, or family history were reviewed personally by me during the office visit. All problems listed in the assessment are stable unless noted otherwise. Medication profile reviewed personally by me during the office visit.   Medication side effects and

## 2018-09-14 NOTE — TELEPHONE ENCOUNTER
Dr. Ornelas Memos:    Notes: This patient has an upcoming appointment with you for Chronic Obstructive Pulmonary Disease (COPD( and Hypertension. In planning for that visit I have completed the following pre-visit planning:     Pre-Visit Planning Checklist:  Patient contacted: yes  Verified patient by name and date of birth: yes    Health Maintenance items reviewed:    No pre-visit planning health maintenance topics to review at this time    Labs and procedures pended:     Labs and procedures discussed with patient: yes  Reminded patient to check with their insurance company about coverage for lab tests and lab location: no    Preliminary Medication Reconciliation: was performed. Reminded patient to arrive early: yes    Please complete the med-reconciliation and sign the appropriate labs as soon as possible.       Ewa Motley, [address]  Pre-Services Specialist

## 2018-09-21 NOTE — TELEPHONE ENCOUNTER
Pareshsonam Steve is requesting refill(s)   Last OV 9/18/18 (pertaining to medication)  LR 11/4/17 (per medication requested)  Next office visit scheduled or attempted Yes   If no, reason:  3/19/19

## 2019-01-01 ENCOUNTER — TELEPHONE (OUTPATIENT)
Dept: PHARMACY | Facility: CLINIC | Age: 74
End: 2019-01-01

## 2019-01-01 ENCOUNTER — TELEPHONE (OUTPATIENT)
Dept: FAMILY MEDICINE CLINIC | Age: 74
End: 2019-01-01

## 2019-01-01 ENCOUNTER — TELEPHONE (OUTPATIENT)
Dept: PULMONOLOGY | Age: 74
End: 2019-01-01

## 2019-01-01 ENCOUNTER — CARE COORDINATION (OUTPATIENT)
Dept: INTERNAL MEDICINE CLINIC | Age: 74
End: 2019-01-01

## 2019-01-01 ENCOUNTER — OFFICE VISIT (OUTPATIENT)
Dept: FAMILY MEDICINE CLINIC | Age: 74
End: 2019-01-01
Payer: MEDICARE

## 2019-01-01 ENCOUNTER — HOSPITAL ENCOUNTER (OUTPATIENT)
Age: 74
Setting detail: SPECIMEN
Discharge: HOME | End: 2019-07-02
Payer: MEDICARE

## 2019-01-01 ENCOUNTER — CARE COORDINATION (OUTPATIENT)
Dept: CASE MANAGEMENT | Age: 74
End: 2019-01-01

## 2019-01-01 ENCOUNTER — APPOINTMENT (OUTPATIENT)
Dept: GENERAL RADIOLOGY | Age: 74
DRG: 189 | End: 2019-01-01
Payer: MEDICARE

## 2019-01-01 ENCOUNTER — CARE COORDINATION (OUTPATIENT)
Dept: CARE COORDINATION | Age: 74
End: 2019-01-01

## 2019-01-01 ENCOUNTER — APPOINTMENT (OUTPATIENT)
Dept: GENERAL RADIOLOGY | Age: 74
End: 2019-01-01
Payer: MEDICARE

## 2019-01-01 ENCOUNTER — APPOINTMENT (OUTPATIENT)
Dept: GENERAL RADIOLOGY | Age: 74
DRG: 917 | End: 2019-01-01
Payer: MEDICARE

## 2019-01-01 ENCOUNTER — OFFICE VISIT (OUTPATIENT)
Dept: FAMILY MEDICINE CLINIC | Age: 74
End: 2019-01-01
Payer: COMMERCIAL

## 2019-01-01 ENCOUNTER — HOSPITAL ENCOUNTER (INPATIENT)
Age: 74
LOS: 2 days | Discharge: HOME HEALTH | DRG: 189 | End: 2019-07-20
Attending: EMERGENCY MEDICINE | Admitting: INTERNAL MEDICINE
Payer: MEDICARE

## 2019-01-01 ENCOUNTER — HOSPITAL ENCOUNTER (EMERGENCY)
Age: 74
Discharge: HOME | End: 2019-04-18
Attending: EMERGENCY MEDICINE
Payer: MEDICARE

## 2019-01-01 ENCOUNTER — HOSPITAL ENCOUNTER (INPATIENT)
Age: 74
LOS: 2 days | Discharge: HOME | DRG: 189 | End: 2019-06-15
Attending: EMERGENCY MEDICINE | Admitting: INTERNAL MEDICINE
Payer: MEDICARE

## 2019-01-01 ENCOUNTER — HOSPITAL ENCOUNTER (INPATIENT)
Age: 74
LOS: 2 days | Discharge: HOME HEALTH | DRG: 917 | End: 2019-07-08
Attending: EMERGENCY MEDICINE | Admitting: HOSPITALIST
Payer: MEDICARE

## 2019-01-01 ENCOUNTER — HOSPITAL ENCOUNTER (INPATIENT)
Age: 74
LOS: 4 days | Discharge: HOME HEALTH | DRG: 189 | End: 2019-05-08
Attending: EMERGENCY MEDICINE | Admitting: INTERNAL MEDICINE
Payer: MEDICARE

## 2019-01-01 ENCOUNTER — HOSPITAL ENCOUNTER (EMERGENCY)
Age: 74
Discharge: ANOTHER HOSPITAL | End: 2019-02-13
Payer: MEDICARE

## 2019-01-01 ENCOUNTER — HOSPITAL ENCOUNTER (INPATIENT)
Age: 74
LOS: 3 days | Discharge: HOME HEALTH | DRG: 191 | End: 2019-06-27
Attending: EMERGENCY MEDICINE | Admitting: INTERNAL MEDICINE
Payer: MEDICARE

## 2019-01-01 ENCOUNTER — HOSPITAL ENCOUNTER (EMERGENCY)
Age: 74
Discharge: OTHER INSTITUTION | End: 2019-07-31
Attending: EMERGENCY MEDICINE
Payer: MEDICARE

## 2019-01-01 ENCOUNTER — APPOINTMENT (OUTPATIENT)
Dept: GENERAL RADIOLOGY | Age: 74
DRG: 191 | End: 2019-01-01
Payer: MEDICARE

## 2019-01-01 VITALS
SYSTOLIC BLOOD PRESSURE: 134 MMHG | HEIGHT: 64 IN | HEART RATE: 99 BPM | BODY MASS INDEX: 27.72 KG/M2 | DIASTOLIC BLOOD PRESSURE: 84 MMHG | OXYGEN SATURATION: 96 % | WEIGHT: 162.4 LBS

## 2019-01-01 VITALS
OXYGEN SATURATION: 98 % | HEART RATE: 100 BPM | SYSTOLIC BLOOD PRESSURE: 144 MMHG | WEIGHT: 166.4 LBS | BODY MASS INDEX: 28.56 KG/M2 | DIASTOLIC BLOOD PRESSURE: 74 MMHG

## 2019-01-01 VITALS
HEART RATE: 107 BPM | DIASTOLIC BLOOD PRESSURE: 90 MMHG | OXYGEN SATURATION: 98 % | WEIGHT: 164.6 LBS | SYSTOLIC BLOOD PRESSURE: 150 MMHG | BODY MASS INDEX: 28.25 KG/M2

## 2019-01-01 VITALS
WEIGHT: 152.3 LBS | RESPIRATION RATE: 19 BRPM | HEIGHT: 67 IN | HEART RATE: 119 BPM | BODY MASS INDEX: 23.9 KG/M2 | OXYGEN SATURATION: 97 % | SYSTOLIC BLOOD PRESSURE: 135 MMHG | DIASTOLIC BLOOD PRESSURE: 87 MMHG | TEMPERATURE: 97.8 F

## 2019-01-01 VITALS
TEMPERATURE: 98 F | RESPIRATION RATE: 28 BRPM | BODY MASS INDEX: 26.36 KG/M2 | SYSTOLIC BLOOD PRESSURE: 142 MMHG | DIASTOLIC BLOOD PRESSURE: 95 MMHG | OXYGEN SATURATION: 100 % | HEIGHT: 66 IN | WEIGHT: 164 LBS | HEART RATE: 98 BPM

## 2019-01-01 VITALS
HEIGHT: 67 IN | DIASTOLIC BLOOD PRESSURE: 69 MMHG | TEMPERATURE: 98 F | SYSTOLIC BLOOD PRESSURE: 132 MMHG | HEART RATE: 114 BPM | WEIGHT: 175.93 LBS | RESPIRATION RATE: 20 BRPM | OXYGEN SATURATION: 95 % | BODY MASS INDEX: 27.61 KG/M2

## 2019-01-01 VITALS
SYSTOLIC BLOOD PRESSURE: 132 MMHG | WEIGHT: 173.94 LBS | HEART RATE: 77 BPM | RESPIRATION RATE: 18 BRPM | HEIGHT: 67 IN | OXYGEN SATURATION: 98 % | BODY MASS INDEX: 27.3 KG/M2 | TEMPERATURE: 98.1 F | DIASTOLIC BLOOD PRESSURE: 82 MMHG

## 2019-01-01 VITALS
SYSTOLIC BLOOD PRESSURE: 104 MMHG | BODY MASS INDEX: 28 KG/M2 | DIASTOLIC BLOOD PRESSURE: 72 MMHG | WEIGHT: 164 LBS | HEART RATE: 113 BPM | OXYGEN SATURATION: 98 % | HEIGHT: 64 IN

## 2019-01-01 VITALS
SYSTOLIC BLOOD PRESSURE: 110 MMHG | OXYGEN SATURATION: 100 % | HEIGHT: 67 IN | HEART RATE: 89 BPM | RESPIRATION RATE: 18 BRPM | WEIGHT: 154 LBS | DIASTOLIC BLOOD PRESSURE: 66 MMHG | TEMPERATURE: 99.1 F | BODY MASS INDEX: 24.17 KG/M2

## 2019-01-01 VITALS
BODY MASS INDEX: 29.32 KG/M2 | HEART RATE: 83 BPM | DIASTOLIC BLOOD PRESSURE: 90 MMHG | WEIGHT: 170.8 LBS | OXYGEN SATURATION: 93 % | SYSTOLIC BLOOD PRESSURE: 142 MMHG

## 2019-01-01 VITALS
HEIGHT: 66 IN | WEIGHT: 162.1 LBS | DIASTOLIC BLOOD PRESSURE: 83 MMHG | TEMPERATURE: 98 F | OXYGEN SATURATION: 96 % | RESPIRATION RATE: 17 BRPM | SYSTOLIC BLOOD PRESSURE: 175 MMHG | HEART RATE: 87 BPM | BODY MASS INDEX: 26.05 KG/M2

## 2019-01-01 VITALS
WEIGHT: 163 LBS | HEART RATE: 76 BPM | DIASTOLIC BLOOD PRESSURE: 80 MMHG | OXYGEN SATURATION: 93 % | BODY MASS INDEX: 25.53 KG/M2 | SYSTOLIC BLOOD PRESSURE: 152 MMHG

## 2019-01-01 VITALS
BODY MASS INDEX: 24.22 KG/M2 | DIASTOLIC BLOOD PRESSURE: 93 MMHG | WEIGHT: 154.3 LBS | OXYGEN SATURATION: 96 % | SYSTOLIC BLOOD PRESSURE: 169 MMHG | RESPIRATION RATE: 16 BRPM | TEMPERATURE: 98.1 F | HEART RATE: 94 BPM | HEIGHT: 67 IN

## 2019-01-01 VITALS
OXYGEN SATURATION: 100 % | BODY MASS INDEX: 26.68 KG/M2 | TEMPERATURE: 99 F | SYSTOLIC BLOOD PRESSURE: 145 MMHG | DIASTOLIC BLOOD PRESSURE: 94 MMHG | WEIGHT: 166 LBS | HEIGHT: 66 IN | RESPIRATION RATE: 16 BRPM | HEART RATE: 99 BPM

## 2019-01-01 DIAGNOSIS — F17.200 SMOKER'S RESPIRATORY SYNDROME: ICD-10-CM

## 2019-01-01 DIAGNOSIS — F32.9 REACTIVE DEPRESSION: Primary | ICD-10-CM

## 2019-01-01 DIAGNOSIS — M51.36 DEGENERATION OF INTERVERTEBRAL DISC OF LUMBAR REGION: ICD-10-CM

## 2019-01-01 DIAGNOSIS — Z72.0 TOBACCO ABUSE: Primary | ICD-10-CM

## 2019-01-01 DIAGNOSIS — J41.0 SIMPLE CHRONIC BRONCHITIS (HCC): ICD-10-CM

## 2019-01-01 DIAGNOSIS — M54.41 CHRONIC MIDLINE LOW BACK PAIN WITH BILATERAL SCIATICA: ICD-10-CM

## 2019-01-01 DIAGNOSIS — J41.1 MUCOPURULENT CHRONIC BRONCHITIS (HCC): ICD-10-CM

## 2019-01-01 DIAGNOSIS — M54.42 CHRONIC MIDLINE LOW BACK PAIN WITH BILATERAL SCIATICA: ICD-10-CM

## 2019-01-01 DIAGNOSIS — E78.2 MIXED HYPERLIPIDEMIA: ICD-10-CM

## 2019-01-01 DIAGNOSIS — F32.9 REACTIVE DEPRESSION: ICD-10-CM

## 2019-01-01 DIAGNOSIS — I10 ESSENTIAL HYPERTENSION: Primary | ICD-10-CM

## 2019-01-01 DIAGNOSIS — J96.02 ACUTE HYPERCAPNIC RESPIRATORY FAILURE (HCC): ICD-10-CM

## 2019-01-01 DIAGNOSIS — I25.10 CORONARY ARTERY DISEASE DUE TO LIPID RICH PLAQUE: ICD-10-CM

## 2019-01-01 DIAGNOSIS — J44.1 COPD WITH ACUTE EXACERBATION (HCC): Primary | ICD-10-CM

## 2019-01-01 DIAGNOSIS — E87.1 HYPONATREMIA: Primary | ICD-10-CM

## 2019-01-01 DIAGNOSIS — R60.9 PERIPHERAL EDEMA: ICD-10-CM

## 2019-01-01 DIAGNOSIS — R06.02 SOB (SHORTNESS OF BREATH): Primary | ICD-10-CM

## 2019-01-01 DIAGNOSIS — H54.7 DECREASED VISUAL ACUITY: ICD-10-CM

## 2019-01-01 DIAGNOSIS — R06.89 HYPERCAPNIA: ICD-10-CM

## 2019-01-01 DIAGNOSIS — F41.9 ANXIETY: ICD-10-CM

## 2019-01-01 DIAGNOSIS — J44.1 COPD EXACERBATION (HCC): Primary | ICD-10-CM

## 2019-01-01 DIAGNOSIS — D64.9 ANEMIA, UNSPECIFIED TYPE: ICD-10-CM

## 2019-01-01 DIAGNOSIS — I25.83 CORONARY ARTERY DISEASE DUE TO LIPID RICH PLAQUE: ICD-10-CM

## 2019-01-01 DIAGNOSIS — R03.0 ELEVATED BLOOD PRESSURE READING: ICD-10-CM

## 2019-01-01 DIAGNOSIS — I10 BENIGN ESSENTIAL HYPERTENSION: ICD-10-CM

## 2019-01-01 DIAGNOSIS — J44.9 CHRONIC OBSTRUCTIVE PULMONARY DISEASE, UNSPECIFIED COPD TYPE (HCC): Primary | ICD-10-CM

## 2019-01-01 DIAGNOSIS — F51.01 PRIMARY INSOMNIA: ICD-10-CM

## 2019-01-01 DIAGNOSIS — S32.000S COMPRESSION FRACTURE OF LUMBAR VERTEBRA, SEQUELA: Chronic | ICD-10-CM

## 2019-01-01 DIAGNOSIS — J44.9 CHRONIC OBSTRUCTIVE PULMONARY DISEASE, UNSPECIFIED COPD TYPE (HCC): ICD-10-CM

## 2019-01-01 DIAGNOSIS — J96.21 ACUTE AND CHRONIC RESPIRATORY FAILURE WITH HYPOXIA (HCC): ICD-10-CM

## 2019-01-01 DIAGNOSIS — J96.22 ACUTE AND CHRONIC RESPIRATORY FAILURE WITH HYPERCAPNIA (HCC): Primary | ICD-10-CM

## 2019-01-01 DIAGNOSIS — R77.8 ELEVATED TROPONIN: ICD-10-CM

## 2019-01-01 DIAGNOSIS — R09.02 HYPOXIA: ICD-10-CM

## 2019-01-01 DIAGNOSIS — Z72.0 TOBACCO ABUSE: ICD-10-CM

## 2019-01-01 DIAGNOSIS — J96.22 ACUTE ON CHRONIC RESPIRATORY FAILURE WITH HYPERCAPNIA (HCC): Primary | ICD-10-CM

## 2019-01-01 DIAGNOSIS — R06.02 SHORTNESS OF BREATH: ICD-10-CM

## 2019-01-01 DIAGNOSIS — Z99.81 SUPPLEMENTAL OXYGEN DEPENDENT: ICD-10-CM

## 2019-01-01 DIAGNOSIS — F33.41 RECURRENT MAJOR DEPRESSIVE DISORDER IN PARTIAL REMISSION (HCC): ICD-10-CM

## 2019-01-01 DIAGNOSIS — S68.119A TRAUMATIC AMPUTATION OF MULTIPLE FINGERS: ICD-10-CM

## 2019-01-01 DIAGNOSIS — M15.3 POST-TRAUMATIC OSTEOARTHRITIS OF MULTIPLE JOINTS: ICD-10-CM

## 2019-01-01 DIAGNOSIS — Z71.89 ENCOUNTER FOR MEDICATION REVIEW AND COUNSELING: Primary | ICD-10-CM

## 2019-01-01 DIAGNOSIS — H53.9 VISION CHANGES: ICD-10-CM

## 2019-01-01 DIAGNOSIS — R73.9 HYPERGLYCEMIA: ICD-10-CM

## 2019-01-01 DIAGNOSIS — M54.17 LUMBOSACRAL RADICULOPATHY: ICD-10-CM

## 2019-01-01 DIAGNOSIS — M47.817 FACET JOINT DISEASE OF LUMBOSACRAL REGION: ICD-10-CM

## 2019-01-01 DIAGNOSIS — J41.1 MUCOPURULENT CHRONIC BRONCHITIS (HCC): Primary | ICD-10-CM

## 2019-01-01 DIAGNOSIS — J32.4 CHRONIC PANSINUSITIS: ICD-10-CM

## 2019-01-01 DIAGNOSIS — J44.1 COPD EXACERBATION (HCC): ICD-10-CM

## 2019-01-01 DIAGNOSIS — J18.9 PNEUMONIA OF LEFT LOWER LOBE DUE TO INFECTIOUS ORGANISM: ICD-10-CM

## 2019-01-01 DIAGNOSIS — G89.4 CHRONIC PAIN SYNDROME: ICD-10-CM

## 2019-01-01 DIAGNOSIS — R77.8 TROPONIN LEVEL ELEVATED: ICD-10-CM

## 2019-01-01 DIAGNOSIS — E87.5 HYPERKALEMIA: ICD-10-CM

## 2019-01-01 DIAGNOSIS — R09.81 CHRONIC NASAL CONGESTION: Chronic | ICD-10-CM

## 2019-01-01 DIAGNOSIS — F17.200 SMOKER: ICD-10-CM

## 2019-01-01 DIAGNOSIS — R73.9 HYPERGLYCEMIA: Primary | ICD-10-CM

## 2019-01-01 DIAGNOSIS — F41.8 SITUATIONAL ANXIETY: ICD-10-CM

## 2019-01-01 DIAGNOSIS — G89.29 CHRONIC MIDLINE LOW BACK PAIN WITH BILATERAL SCIATICA: ICD-10-CM

## 2019-01-01 DIAGNOSIS — R06.02 SOB (SHORTNESS OF BREATH): ICD-10-CM

## 2019-01-01 DIAGNOSIS — S68.119S TRAUMATIC AMPUTATION OF FINGER WITHOUT COMPLICATION, SEQUELA (HCC): ICD-10-CM

## 2019-01-01 DIAGNOSIS — E55.9 VITAMIN D DEFICIENCY: ICD-10-CM

## 2019-01-01 DIAGNOSIS — R07.9 CHEST PAIN, UNSPECIFIED TYPE: Primary | ICD-10-CM

## 2019-01-01 DIAGNOSIS — R06.03 RESPIRATORY DISTRESS: ICD-10-CM

## 2019-01-01 DIAGNOSIS — F41.9 ANXIETY: Primary | ICD-10-CM

## 2019-01-01 LAB
A/G RATIO: 1.6 (ref 1.1–2.2)
A/G RATIO: 1.6 (ref 1.1–2.2)
A/G RATIO: 1.7 (ref 1.1–2.2)
A/G RATIO: 1.7 (ref 1.1–2.2)
A/G RATIO: 1.8 (ref 1.1–2.2)
ALBUMIN SERPL-MCNC: 3.8 G/DL (ref 3.4–5)
ALBUMIN SERPL-MCNC: 4.1 G/DL (ref 3.4–5)
ALBUMIN SERPL-MCNC: 4.3 G/DL (ref 3.4–5)
ALBUMIN SERPL-MCNC: 4.4 G/DL (ref 3.4–5)
ALBUMIN SERPL-MCNC: 4.5 G/DL (ref 3.4–5)
ALBUMIN SERPL-MCNC: 4.5 G/DL (ref 3.4–5)
ALBUMIN SERPL-MCNC: 4.6 G/DL (ref 3.4–5)
ALBUMIN SERPL-MCNC: 4.6 G/DL (ref 3.4–5)
ALP BLD-CCNC: 126 U/L (ref 40–129)
ALP BLD-CCNC: 136 U/L (ref 40–129)
ALP BLD-CCNC: 74 U/L (ref 40–129)
ALP BLD-CCNC: 80 U/L (ref 40–129)
ALP BLD-CCNC: 80 U/L (ref 40–129)
ALP BLD-CCNC: 84 U/L (ref 40–129)
ALP BLD-CCNC: 84 U/L (ref 40–129)
ALP BLD-CCNC: 91 U/L (ref 40–129)
ALT SERPL-CCNC: 12 U/L (ref 10–40)
ALT SERPL-CCNC: 13 U/L (ref 10–40)
ALT SERPL-CCNC: 14 U/L (ref 10–40)
ALT SERPL-CCNC: 18 U/L (ref 10–40)
ALT SERPL-CCNC: 19 U/L (ref 10–40)
ALT SERPL-CCNC: 19 U/L (ref 10–40)
ALT SERPL-CCNC: 22 U/L (ref 10–40)
ALT SERPL-CCNC: 29 U/L (ref 10–40)
ANION GAP SERPL CALCULATED.3IONS-SCNC: 10 MMOL/L (ref 3–16)
ANION GAP SERPL CALCULATED.3IONS-SCNC: 11 MMOL/L (ref 3–16)
ANION GAP SERPL CALCULATED.3IONS-SCNC: 12 MMOL/L (ref 3–16)
ANION GAP SERPL CALCULATED.3IONS-SCNC: 5 MMOL/L (ref 3–16)
ANION GAP SERPL CALCULATED.3IONS-SCNC: 5 MMOL/L (ref 3–16)
ANION GAP SERPL CALCULATED.3IONS-SCNC: 6 MMOL/L (ref 3–16)
ANION GAP SERPL CALCULATED.3IONS-SCNC: 6 MMOL/L (ref 3–16)
ANION GAP SERPL CALCULATED.3IONS-SCNC: 7 MMOL/L (ref 3–16)
ANION GAP SERPL CALCULATED.3IONS-SCNC: 7 MMOL/L (ref 3–16)
ANION GAP SERPL CALCULATED.3IONS-SCNC: 8 MMOL/L (ref 3–16)
ANION GAP SERPL CALCULATED.3IONS-SCNC: 9 MMOL/L (ref 3–16)
ANION GAP SERPL CALCULATED.3IONS-SCNC: 9 MMOL/L (ref 3–16)
APTT: 24 SEC (ref 26–36)
APTT: 30.4 SEC (ref 26–36)
AST SERPL-CCNC: 15 U/L (ref 15–37)
AST SERPL-CCNC: 17 U/L (ref 15–37)
AST SERPL-CCNC: 17 U/L (ref 15–37)
AST SERPL-CCNC: 18 U/L (ref 15–37)
AST SERPL-CCNC: 19 U/L (ref 15–37)
AST SERPL-CCNC: 21 U/L (ref 15–37)
AST SERPL-CCNC: 24 U/L (ref 15–37)
AST SERPL-CCNC: 25 U/L (ref 15–37)
BASE EXCESS ARTERIAL: 15 MMOL/L (ref -3–3)
BASE EXCESS ARTERIAL: 15.3 MMOL/L (ref -3–3)
BASE EXCESS ARTERIAL: 15.3 MMOL/L (ref -3–3)
BASE EXCESS ARTERIAL: 19.8 MMOL/L (ref -3–3)
BASE EXCESS ARTERIAL: 21.4 MMOL/L (ref -3–3)
BASE EXCESS ARTERIAL: 22.1 MMOL/L (ref -3–3)
BASE EXCESS ARTERIAL: 9.9 MMOL/L (ref -3–3)
BASE EXCESS VENOUS: 15.5 MMOL/L
BASE EXCESS VENOUS: 18 MMOL/L (ref -3–3)
BASE EXCESS VENOUS: 24.4 MMOL/L (ref -3–3)
BASE EXCESS VENOUS: 29.3 MMOL/L (ref -3–3)
BASOPHILS ABSOLUTE: 0 K/UL (ref 0–0.2)
BASOPHILS ABSOLUTE: 0.1 K/UL (ref 0–0.2)
BASOPHILS RELATIVE PERCENT: 0.1 %
BASOPHILS RELATIVE PERCENT: 0.1 %
BASOPHILS RELATIVE PERCENT: 0.2 %
BASOPHILS RELATIVE PERCENT: 0.3 %
BASOPHILS RELATIVE PERCENT: 0.4 %
BASOPHILS RELATIVE PERCENT: 0.4 %
BASOPHILS RELATIVE PERCENT: 0.6 %
BASOPHILS RELATIVE PERCENT: 0.6 %
BILIRUB SERPL-MCNC: 0.3 MG/DL (ref 0–1)
BILIRUB SERPL-MCNC: 0.4 MG/DL (ref 0–1)
BILIRUB SERPL-MCNC: 0.4 MG/DL (ref 0–1)
BILIRUB SERPL-MCNC: 0.6 MG/DL (ref 0–1)
BILIRUB SERPL-MCNC: <0.2 MG/DL (ref 0–1)
BILIRUB SERPL-MCNC: <0.2 MG/DL (ref 0–1)
BILIRUBIN DIRECT: <0.2 MG/DL (ref 0–0.3)
BILIRUBIN URINE: NEGATIVE
BILIRUBIN, INDIRECT: NORMAL MG/DL (ref 0–1)
BLOOD CULTURE, ROUTINE: NORMAL
BLOOD CULTURE, ROUTINE: NORMAL
BLOOD, URINE: NEGATIVE
BUN BLDV-MCNC: 13 MG/DL (ref 7–20)
BUN BLDV-MCNC: 15 MG/DL (ref 7–20)
BUN BLDV-MCNC: 19 MG/DL (ref 7–20)
BUN BLDV-MCNC: 21 MG/DL (ref 7–20)
BUN BLDV-MCNC: 22 MG/DL (ref 7–20)
BUN BLDV-MCNC: 22 MG/DL (ref 7–20)
BUN BLDV-MCNC: 23 MG/DL (ref 7–20)
BUN BLDV-MCNC: 23 MG/DL (ref 7–20)
BUN BLDV-MCNC: 25 MG/DL (ref 7–20)
BUN BLDV-MCNC: 27 MG/DL (ref 7–20)
BUN BLDV-MCNC: 28 MG/DL (ref 7–20)
BUN BLDV-MCNC: 28 MG/DL (ref 7–20)
BUN BLDV-MCNC: 30 MG/DL (ref 7–20)
BUN BLDV-MCNC: 31 MG/DL (ref 7–20)
BUN BLDV-MCNC: 33 MG/DL (ref 7–20)
BUN BLDV-MCNC: 34 MG/DL (ref 7–20)
BUN BLDV-MCNC: 34 MG/DL (ref 7–20)
BUN BLDV-MCNC: 36 MG/DL (ref 7–20)
CALCIUM SERPL-MCNC: 10 MG/DL (ref 8.3–10.6)
CALCIUM SERPL-MCNC: 10 MG/DL (ref 8.3–10.6)
CALCIUM SERPL-MCNC: 10.2 MG/DL (ref 8.3–10.6)
CALCIUM SERPL-MCNC: 10.3 MG/DL (ref 8.3–10.6)
CALCIUM SERPL-MCNC: 10.3 MG/DL (ref 8.3–10.6)
CALCIUM SERPL-MCNC: 10.4 MG/DL (ref 8.3–10.6)
CALCIUM SERPL-MCNC: 8.7 MG/DL (ref 8.3–10.6)
CALCIUM SERPL-MCNC: 8.9 MG/DL (ref 8.3–10.6)
CALCIUM SERPL-MCNC: 9.2 MG/DL (ref 8.3–10.6)
CALCIUM SERPL-MCNC: 9.5 MG/DL (ref 8.3–10.6)
CALCIUM SERPL-MCNC: 9.5 MG/DL (ref 8.3–10.6)
CALCIUM SERPL-MCNC: 9.6 MG/DL (ref 8.3–10.6)
CALCIUM SERPL-MCNC: 9.8 MG/DL (ref 8.3–10.6)
CALCIUM SERPL-MCNC: 9.8 MG/DL (ref 8.3–10.6)
CALCIUM SERPL-MCNC: 9.9 MG/DL (ref 8.3–10.6)
CARBOXYHEMOGLOBIN ARTERIAL: 0.6 % (ref 0–1.5)
CARBOXYHEMOGLOBIN ARTERIAL: 1.7 % (ref 0–1.5)
CARBOXYHEMOGLOBIN ARTERIAL: 2.3 % (ref 0–1.5)
CARBOXYHEMOGLOBIN ARTERIAL: 2.5 % (ref 0–1.5)
CARBOXYHEMOGLOBIN ARTERIAL: 2.7 % (ref 0–1.5)
CARBOXYHEMOGLOBIN ARTERIAL: 2.8 % (ref 0–1.5)
CARBOXYHEMOGLOBIN ARTERIAL: 4.7 % (ref 0–1.5)
CARBOXYHEMOGLOBIN: 10.2 % (ref 0–1.5)
CARBOXYHEMOGLOBIN: 3 % (ref 0–1.5)
CARBOXYHEMOGLOBIN: 6.8 %
CARBOXYHEMOGLOBIN: 8.5 % (ref 0–1.5)
CHLORIDE BLD-SCNC: 82 MMOL/L (ref 99–110)
CHLORIDE BLD-SCNC: 85 MMOL/L (ref 99–110)
CHLORIDE BLD-SCNC: 85 MMOL/L (ref 99–110)
CHLORIDE BLD-SCNC: 86 MMOL/L (ref 99–110)
CHLORIDE BLD-SCNC: 87 MMOL/L (ref 99–110)
CHLORIDE BLD-SCNC: 87 MMOL/L (ref 99–110)
CHLORIDE BLD-SCNC: 88 MMOL/L (ref 99–110)
CHLORIDE BLD-SCNC: 89 MMOL/L (ref 99–110)
CHLORIDE BLD-SCNC: 89 MMOL/L (ref 99–110)
CHLORIDE BLD-SCNC: 90 MMOL/L (ref 99–110)
CHLORIDE BLD-SCNC: 91 MMOL/L (ref 99–110)
CHLORIDE BLD-SCNC: 92 MMOL/L (ref 99–110)
CHLORIDE BLD-SCNC: 93 MMOL/L (ref 99–110)
CHLORIDE BLD-SCNC: 93 MMOL/L (ref 99–110)
CHLORIDE BLD-SCNC: 95 MMOL/L (ref 99–110)
CHLORIDE BLD-SCNC: 97 MMOL/L (ref 99–110)
CLARITY: CLEAR
CO2: 36 MMOL/L (ref 21–32)
CO2: 37 MMOL/L (ref 21–32)
CO2: 37 MMOL/L (ref 21–32)
CO2: 38 MMOL/L (ref 21–32)
CO2: 38 MMOL/L (ref 21–32)
CO2: 39 MMOL/L (ref 21–32)
CO2: 39 MMOL/L (ref 21–32)
CO2: 40 MMOL/L (ref 21–32)
CO2: 41 MMOL/L (ref 21–32)
CO2: 41 MMOL/L (ref 21–32)
CO2: 42 MMOL/L (ref 21–32)
CO2: 44 MMOL/L (ref 21–32)
CO2: 45 MMOL/L (ref 21–32)
CO2: 48 MMOL/L (ref 21–32)
CO2: 48 MMOL/L (ref 21–32)
CO2: >50 MMOL/L (ref 21–32)
COLOR: NORMAL
COLOR: YELLOW
COLOR: YELLOW
CREAT SERPL-MCNC: 0.6 MG/DL (ref 0.8–1.3)
CREAT SERPL-MCNC: 0.7 MG/DL (ref 0.8–1.3)
CREAT SERPL-MCNC: 0.8 MG/DL (ref 0.8–1.3)
CREAT SERPL-MCNC: 0.9 MG/DL (ref 0.8–1.3)
CREAT SERPL-MCNC: 1.1 MG/DL (ref 0.8–1.3)
CREAT SERPL-MCNC: <0.5 MG/DL (ref 0.8–1.3)
CULTURE, BLOOD 2: NORMAL
CULTURE, BLOOD 2: NORMAL
D DIMER: 219 NG/ML DDU (ref 0–229)
DIGOXIN LEVEL: <0.3 NG/ML (ref 0.8–2)
EKG ATRIAL RATE: 102 BPM
EKG ATRIAL RATE: 103 BPM
EKG ATRIAL RATE: 108 BPM
EKG ATRIAL RATE: 112 BPM
EKG ATRIAL RATE: 76 BPM
EKG ATRIAL RATE: 89 BPM
EKG DIAGNOSIS: NORMAL
EKG P AXIS: 48 DEGREES
EKG P AXIS: 54 DEGREES
EKG P AXIS: 56 DEGREES
EKG P AXIS: 58 DEGREES
EKG P AXIS: 60 DEGREES
EKG P-R INTERVAL: 142 MS
EKG P-R INTERVAL: 146 MS
EKG P-R INTERVAL: 152 MS
EKG P-R INTERVAL: 156 MS
EKG P-R INTERVAL: 168 MS
EKG Q-T INTERVAL: 314 MS
EKG Q-T INTERVAL: 320 MS
EKG Q-T INTERVAL: 326 MS
EKG Q-T INTERVAL: 332 MS
EKG Q-T INTERVAL: 348 MS
EKG Q-T INTERVAL: 364 MS
EKG QRS DURATION: 56 MS
EKG QRS DURATION: 70 MS
EKG QRS DURATION: 74 MS
EKG QRS DURATION: 74 MS
EKG QRS DURATION: 78 MS
EKG QRS DURATION: 80 MS
EKG QTC CALCULATION (BAZETT): 409 MS
EKG QTC CALCULATION (BAZETT): 417 MS
EKG QTC CALCULATION (BAZETT): 420 MS
EKG QTC CALCULATION (BAZETT): 423 MS
EKG QTC CALCULATION (BAZETT): 434 MS
EKG QTC CALCULATION (BAZETT): 444 MS
EKG R AXIS: -20 DEGREES
EKG R AXIS: -25 DEGREES
EKG R AXIS: -40 DEGREES
EKG R AXIS: -41 DEGREES
EKG R AXIS: -45 DEGREES
EKG R AXIS: 3 DEGREES
EKG T AXIS: 26 DEGREES
EKG T AXIS: 31 DEGREES
EKG T AXIS: 46 DEGREES
EKG T AXIS: 51 DEGREES
EKG T AXIS: 56 DEGREES
EKG T AXIS: 58 DEGREES
EKG VENTRICULAR RATE: 102 BPM
EKG VENTRICULAR RATE: 103 BPM
EKG VENTRICULAR RATE: 108 BPM
EKG VENTRICULAR RATE: 112 BPM
EKG VENTRICULAR RATE: 76 BPM
EKG VENTRICULAR RATE: 89 BPM
EOSINOPHILS ABSOLUTE: 0 K/UL (ref 0–0.6)
EOSINOPHILS ABSOLUTE: 0.1 K/UL (ref 0–0.6)
EOSINOPHILS RELATIVE PERCENT: 0 %
EOSINOPHILS RELATIVE PERCENT: 0.1 %
EOSINOPHILS RELATIVE PERCENT: 0.2 %
EOSINOPHILS RELATIVE PERCENT: 0.4 %
EOSINOPHILS RELATIVE PERCENT: 0.4 %
EOSINOPHILS RELATIVE PERCENT: 0.7 %
EOSINOPHILS RELATIVE PERCENT: 1.1 %
EOSINOPHILS RELATIVE PERCENT: 1.3 %
GFR AFRICAN AMERICAN: >60
GFR NON-AFRICAN AMERICAN: >60
GLOBULIN: 2.1 G/DL
GLOBULIN: 2.4 G/DL
GLOBULIN: 2.5 G/DL
GLOBULIN: 2.5 G/DL
GLOBULIN: 2.6 G/DL
GLOBULIN: 2.7 G/DL
GLUCOSE BLD-MCNC: 102 MG/DL (ref 70–99)
GLUCOSE BLD-MCNC: 104 MG/DL (ref 70–99)
GLUCOSE BLD-MCNC: 107 MG/DL (ref 70–99)
GLUCOSE BLD-MCNC: 113 MG/DL (ref 70–99)
GLUCOSE BLD-MCNC: 118 MG/DL (ref 70–99)
GLUCOSE BLD-MCNC: 121 MG/DL (ref 70–99)
GLUCOSE BLD-MCNC: 122 MG/DL (ref 70–99)
GLUCOSE BLD-MCNC: 124 MG/DL (ref 70–99)
GLUCOSE BLD-MCNC: 126 MG/DL (ref 70–99)
GLUCOSE BLD-MCNC: 129 MG/DL (ref 70–99)
GLUCOSE BLD-MCNC: 130 MG/DL (ref 70–99)
GLUCOSE BLD-MCNC: 131 MG/DL (ref 70–99)
GLUCOSE BLD-MCNC: 183 MG/DL (ref 70–99)
GLUCOSE BLD-MCNC: 186 MG/DL (ref 70–99)
GLUCOSE BLD-MCNC: 242 MG/DL (ref 70–99)
GLUCOSE BLD-MCNC: 81 MG/DL (ref 70–99)
GLUCOSE BLD-MCNC: 86 MG/DL (ref 70–99)
GLUCOSE BLD-MCNC: 91 MG/DL (ref 70–99)
GLUCOSE URINE: NEGATIVE MG/DL
HCO3 ARTERIAL: 38.6 MMOL/L (ref 21–29)
HCO3 ARTERIAL: 43.4 MMOL/L (ref 21–29)
HCO3 ARTERIAL: 43.4 MMOL/L (ref 21–29)
HCO3 ARTERIAL: 44 MMOL/L (ref 21–29)
HCO3 ARTERIAL: 47.1 MMOL/L (ref 21–29)
HCO3 ARTERIAL: 49.1 MMOL/L (ref 21–29)
HCO3 ARTERIAL: 51.1 MMOL/L (ref 21–29)
HCO3 VENOUS: 46 MMOL/L (ref 23–29)
HCO3 VENOUS: 48.4 MMOL/L (ref 23–29)
HCO3 VENOUS: 55.8 MMOL/L (ref 23–29)
HCO3 VENOUS: 58.4 MMOL/L (ref 23–29)
HCT VFR BLD CALC: 30.9 % (ref 40.5–52.5)
HCT VFR BLD CALC: 31 % (ref 40.5–52.5)
HCT VFR BLD CALC: 32 % (ref 40.5–52.5)
HCT VFR BLD CALC: 32.3 % (ref 40.5–52.5)
HCT VFR BLD CALC: 32.3 % (ref 40.5–52.5)
HCT VFR BLD CALC: 32.6 % (ref 40.5–52.5)
HCT VFR BLD CALC: 35.8 % (ref 40.5–52.5)
HCT VFR BLD CALC: 36.1 % (ref 40.5–52.5)
HCT VFR BLD CALC: 36.5 % (ref 40.5–52.5)
HCT VFR BLD CALC: 37 % (ref 40.5–52.5)
HCT VFR BLD CALC: 37.3 % (ref 40.5–52.5)
HCT VFR BLD CALC: 38 % (ref 40.5–52.5)
HCT VFR BLD CALC: 38.5 % (ref 40.5–52.5)
HCT VFR BLD CALC: 39.1 % (ref 40.5–52.5)
HCT VFR BLD CALC: 39.4 % (ref 40.5–52.5)
HCT VFR BLD CALC: 41.1 % (ref 40.5–52.5)
HEMOGLOBIN, ART, EXTENDED: 11.5 G/DL (ref 13.5–17.5)
HEMOGLOBIN, ART, EXTENDED: 11.7 G/DL (ref 13.5–17.5)
HEMOGLOBIN, ART, EXTENDED: 11.7 G/DL (ref 13.5–17.5)
HEMOGLOBIN, ART, EXTENDED: 11.8 G/DL (ref 13.5–17.5)
HEMOGLOBIN, ART, EXTENDED: 11.8 G/DL (ref 13.5–17.5)
HEMOGLOBIN, ART, EXTENDED: 12 G/DL (ref 13.5–17.5)
HEMOGLOBIN, ART, EXTENDED: 13.1 G/DL (ref 13.5–17.5)
HEMOGLOBIN: 10.3 G/DL (ref 13.5–17.5)
HEMOGLOBIN: 10.5 G/DL (ref 13.5–17.5)
HEMOGLOBIN: 10.9 G/DL (ref 13.5–17.5)
HEMOGLOBIN: 11 G/DL (ref 13.5–17.5)
HEMOGLOBIN: 11 G/DL (ref 13.5–17.5)
HEMOGLOBIN: 11.7 G/DL (ref 13.5–17.5)
HEMOGLOBIN: 12 G/DL (ref 13.5–17.5)
HEMOGLOBIN: 12 G/DL (ref 13.5–17.5)
HEMOGLOBIN: 12.1 G/DL (ref 13.5–17.5)
HEMOGLOBIN: 12.2 G/DL (ref 13.5–17.5)
HEMOGLOBIN: 12.5 G/DL (ref 13.5–17.5)
HEMOGLOBIN: 12.6 G/DL (ref 13.5–17.5)
HEMOGLOBIN: 12.8 G/DL (ref 13.5–17.5)
HEMOGLOBIN: 12.9 G/DL (ref 13.5–17.5)
HEMOGLOBIN: 13.8 G/DL (ref 13.5–17.5)
HEMOGLOBIN: 9.9 G/DL (ref 13.5–17.5)
INR BLD: 0.88 (ref 0.86–1.14)
INR BLD: 0.89 (ref 0.86–1.14)
INR BLD: 1.01 (ref 0.86–1.14)
KETONES, URINE: ABNORMAL MG/DL
KETONES, URINE: NEGATIVE MG/DL
KETONES, URINE: NEGATIVE MG/DL
LACTIC ACID, SEPSIS: 1.2 MMOL/L (ref 0.4–1.9)
LACTIC ACID: 0.8 MMOL/L (ref 0.4–2)
LACTIC ACID: 1.3 MMOL/L (ref 0.4–2)
LACTIC ACID: 1.3 MMOL/L (ref 0.4–2)
LACTIC ACID: 1.4 MMOL/L (ref 0.4–2)
LEUKOCYTE ESTERASE, URINE: NEGATIVE
LYMPHOCYTES ABSOLUTE: 0.3 K/UL (ref 1–5.1)
LYMPHOCYTES ABSOLUTE: 0.5 K/UL (ref 1–5.1)
LYMPHOCYTES ABSOLUTE: 0.7 K/UL (ref 1–5.1)
LYMPHOCYTES ABSOLUTE: 0.7 K/UL (ref 1–5.1)
LYMPHOCYTES ABSOLUTE: 1.1 K/UL (ref 1–5.1)
LYMPHOCYTES ABSOLUTE: 1.2 K/UL (ref 1–5.1)
LYMPHOCYTES ABSOLUTE: 1.3 K/UL (ref 1–5.1)
LYMPHOCYTES ABSOLUTE: 1.3 K/UL (ref 1–5.1)
LYMPHOCYTES ABSOLUTE: 2 K/UL (ref 1–5.1)
LYMPHOCYTES ABSOLUTE: 2 K/UL (ref 1–5.1)
LYMPHOCYTES ABSOLUTE: 2.3 K/UL (ref 1–5.1)
LYMPHOCYTES ABSOLUTE: 2.6 K/UL (ref 1–5.1)
LYMPHOCYTES RELATIVE PERCENT: 10.6 %
LYMPHOCYTES RELATIVE PERCENT: 10.7 %
LYMPHOCYTES RELATIVE PERCENT: 11.5 %
LYMPHOCYTES RELATIVE PERCENT: 12.3 %
LYMPHOCYTES RELATIVE PERCENT: 15.6 %
LYMPHOCYTES RELATIVE PERCENT: 18.1 %
LYMPHOCYTES RELATIVE PERCENT: 20.5 %
LYMPHOCYTES RELATIVE PERCENT: 22.6 %
LYMPHOCYTES RELATIVE PERCENT: 4.1 %
LYMPHOCYTES RELATIVE PERCENT: 4.7 %
LYMPHOCYTES RELATIVE PERCENT: 5.7 %
LYMPHOCYTES RELATIVE PERCENT: 6.8 %
MAGNESIUM: 2.5 MG/DL (ref 1.8–2.4)
MCH RBC QN AUTO: 33.7 PG (ref 26–34)
MCH RBC QN AUTO: 33.8 PG (ref 26–34)
MCH RBC QN AUTO: 33.8 PG (ref 26–34)
MCH RBC QN AUTO: 33.9 PG (ref 26–34)
MCH RBC QN AUTO: 34 PG (ref 26–34)
MCH RBC QN AUTO: 34.1 PG (ref 26–34)
MCH RBC QN AUTO: 34.2 PG (ref 26–34)
MCH RBC QN AUTO: 34.4 PG (ref 26–34)
MCH RBC QN AUTO: 34.4 PG (ref 26–34)
MCH RBC QN AUTO: 34.5 PG (ref 26–34)
MCH RBC QN AUTO: 34.5 PG (ref 26–34)
MCH RBC QN AUTO: 34.6 PG (ref 26–34)
MCH RBC QN AUTO: 34.8 PG (ref 26–34)
MCH RBC QN AUTO: 34.9 PG (ref 26–34)
MCHC RBC AUTO-ENTMCNC: 32.1 G/DL (ref 31–36)
MCHC RBC AUTO-ENTMCNC: 32.4 G/DL (ref 31–36)
MCHC RBC AUTO-ENTMCNC: 32.6 G/DL (ref 31–36)
MCHC RBC AUTO-ENTMCNC: 32.6 G/DL (ref 31–36)
MCHC RBC AUTO-ENTMCNC: 32.7 G/DL (ref 31–36)
MCHC RBC AUTO-ENTMCNC: 32.8 G/DL (ref 31–36)
MCHC RBC AUTO-ENTMCNC: 32.8 G/DL (ref 31–36)
MCHC RBC AUTO-ENTMCNC: 32.9 G/DL (ref 31–36)
MCHC RBC AUTO-ENTMCNC: 32.9 G/DL (ref 31–36)
MCHC RBC AUTO-ENTMCNC: 33 G/DL (ref 31–36)
MCHC RBC AUTO-ENTMCNC: 33.4 G/DL (ref 31–36)
MCHC RBC AUTO-ENTMCNC: 33.5 G/DL (ref 31–36)
MCHC RBC AUTO-ENTMCNC: 33.6 G/DL (ref 31–36)
MCHC RBC AUTO-ENTMCNC: 33.7 G/DL (ref 31–36)
MCHC RBC AUTO-ENTMCNC: 34.1 G/DL (ref 31–36)
MCHC RBC AUTO-ENTMCNC: 34.2 G/DL (ref 31–36)
MCV RBC AUTO: 101.1 FL (ref 80–100)
MCV RBC AUTO: 102.1 FL (ref 80–100)
MCV RBC AUTO: 102.2 FL (ref 80–100)
MCV RBC AUTO: 102.4 FL (ref 80–100)
MCV RBC AUTO: 102.6 FL (ref 80–100)
MCV RBC AUTO: 102.6 FL (ref 80–100)
MCV RBC AUTO: 103.3 FL (ref 80–100)
MCV RBC AUTO: 103.3 FL (ref 80–100)
MCV RBC AUTO: 103.5 FL (ref 80–100)
MCV RBC AUTO: 103.7 FL (ref 80–100)
MCV RBC AUTO: 103.8 FL (ref 80–100)
MCV RBC AUTO: 104.1 FL (ref 80–100)
MCV RBC AUTO: 104.4 FL (ref 80–100)
MCV RBC AUTO: 104.8 FL (ref 80–100)
MCV RBC AUTO: 105.3 FL (ref 80–100)
MCV RBC AUTO: 105.4 FL (ref 80–100)
METHEMOGLOBIN ARTERIAL: 0.1 %
METHEMOGLOBIN ARTERIAL: 0.2 %
METHEMOGLOBIN ARTERIAL: 0.2 %
METHEMOGLOBIN ARTERIAL: 0.3 %
METHEMOGLOBIN ARTERIAL: 0.7 %
METHEMOGLOBIN ARTERIAL: 0.9 %
METHEMOGLOBIN ARTERIAL: 1.3 %
METHEMOGLOBIN VENOUS: 0.3 %
METHEMOGLOBIN VENOUS: 0.8 %
MICROSCOPIC EXAMINATION: ABNORMAL
MICROSCOPIC EXAMINATION: NORMAL
MICROSCOPIC EXAMINATION: NORMAL
MONOCYTES ABSOLUTE: 0.2 K/UL (ref 0–1.3)
MONOCYTES ABSOLUTE: 0.5 K/UL (ref 0–1.3)
MONOCYTES ABSOLUTE: 0.6 K/UL (ref 0–1.3)
MONOCYTES ABSOLUTE: 0.7 K/UL (ref 0–1.3)
MONOCYTES ABSOLUTE: 0.7 K/UL (ref 0–1.3)
MONOCYTES ABSOLUTE: 0.9 K/UL (ref 0–1.3)
MONOCYTES ABSOLUTE: 0.9 K/UL (ref 0–1.3)
MONOCYTES ABSOLUTE: 1 K/UL (ref 0–1.3)
MONOCYTES ABSOLUTE: 1.2 K/UL (ref 0–1.3)
MONOCYTES ABSOLUTE: 1.3 K/UL (ref 0–1.3)
MONOCYTES RELATIVE PERCENT: 10.5 %
MONOCYTES RELATIVE PERCENT: 3.2 %
MONOCYTES RELATIVE PERCENT: 4.4 %
MONOCYTES RELATIVE PERCENT: 5.1 %
MONOCYTES RELATIVE PERCENT: 6.6 %
MONOCYTES RELATIVE PERCENT: 7.4 %
MONOCYTES RELATIVE PERCENT: 7.6 %
MONOCYTES RELATIVE PERCENT: 8.2 %
MONOCYTES RELATIVE PERCENT: 9 %
MONOCYTES RELATIVE PERCENT: 9.1 %
MONOCYTES RELATIVE PERCENT: 9.2 %
MONOCYTES RELATIVE PERCENT: 9.6 %
NEUTROPHILS ABSOLUTE: 10.3 K/UL (ref 1.7–7.7)
NEUTROPHILS ABSOLUTE: 11 K/UL (ref 1.7–7.7)
NEUTROPHILS ABSOLUTE: 12.7 K/UL (ref 1.7–7.7)
NEUTROPHILS ABSOLUTE: 5.5 K/UL (ref 1.7–7.7)
NEUTROPHILS ABSOLUTE: 6.5 K/UL (ref 1.7–7.7)
NEUTROPHILS ABSOLUTE: 6.7 K/UL (ref 1.7–7.7)
NEUTROPHILS ABSOLUTE: 7.8 K/UL (ref 1.7–7.7)
NEUTROPHILS ABSOLUTE: 8.4 K/UL (ref 1.7–7.7)
NEUTROPHILS ABSOLUTE: 9 K/UL (ref 1.7–7.7)
NEUTROPHILS ABSOLUTE: 9.2 K/UL (ref 1.7–7.7)
NEUTROPHILS ABSOLUTE: 9.4 K/UL (ref 1.7–7.7)
NEUTROPHILS ABSOLUTE: 9.8 K/UL (ref 1.7–7.7)
NEUTROPHILS RELATIVE PERCENT: 67.7 %
NEUTROPHILS RELATIVE PERCENT: 68.4 %
NEUTROPHILS RELATIVE PERCENT: 73.1 %
NEUTROPHILS RELATIVE PERCENT: 74 %
NEUTROPHILS RELATIVE PERCENT: 77.4 %
NEUTROPHILS RELATIVE PERCENT: 79.2 %
NEUTROPHILS RELATIVE PERCENT: 79.9 %
NEUTROPHILS RELATIVE PERCENT: 81.1 %
NEUTROPHILS RELATIVE PERCENT: 86.4 %
NEUTROPHILS RELATIVE PERCENT: 88.8 %
NEUTROPHILS RELATIVE PERCENT: 90.4 %
NEUTROPHILS RELATIVE PERCENT: 92.6 %
NITRITE, URINE: NEGATIVE
O2 CONTENT ARTERIAL: 16 ML/DL
O2 CONTENT ARTERIAL: 18 ML/DL
O2 CONTENT, VEN: 10 VOL %
O2 CONTENT, VEN: 11 VOL %
O2 CONTENT, VEN: 12 VOL %
O2 CONTENT, VEN: 15 ML/DL
O2 SAT, ARTERIAL: 95.9 %
O2 SAT, ARTERIAL: 96.8 %
O2 SAT, ARTERIAL: 97.6 %
O2 SAT, ARTERIAL: 97.6 %
O2 SAT, ARTERIAL: 98 %
O2 SAT, ARTERIAL: 98.1 %
O2 SAT, ARTERIAL: 99.4 %
O2 SAT, VEN: 55 %
O2 SAT, VEN: 59 %
O2 SAT, VEN: 70 %
O2 SAT, VEN: 90 %
O2 THERAPY: ABNORMAL
PCO2 ARTERIAL: 69.2 MMHG (ref 35–45)
PCO2 ARTERIAL: 70.5 MMHG (ref 35–45)
PCO2 ARTERIAL: 72 MMHG (ref 35–45)
PCO2 ARTERIAL: 73.8 MMHG (ref 35–45)
PCO2 ARTERIAL: 75.8 MMHG (ref 35–45)
PCO2 ARTERIAL: 83.4 MMHG (ref 35–45)
PCO2 ARTERIAL: 83.5 MMHG (ref 35–45)
PCO2, VEN: 100.4 MMHG (ref 40–50)
PCO2, VEN: 85.5 MMHG (ref 40–50)
PCO2, VEN: 91.2 MMHG (ref 40–50)
PCO2, VEN: 92.5 MMHG (ref 40–50)
PDW BLD-RTO: 13 % (ref 12.4–15.4)
PDW BLD-RTO: 13.1 % (ref 12.4–15.4)
PDW BLD-RTO: 13.7 % (ref 12.4–15.4)
PDW BLD-RTO: 13.7 % (ref 12.4–15.4)
PDW BLD-RTO: 13.9 % (ref 12.4–15.4)
PDW BLD-RTO: 14 % (ref 12.4–15.4)
PDW BLD-RTO: 14.1 % (ref 12.4–15.4)
PDW BLD-RTO: 14.2 % (ref 12.4–15.4)
PDW BLD-RTO: 14.3 % (ref 12.4–15.4)
PH ARTERIAL: 7.33 (ref 7.35–7.45)
PH ARTERIAL: 7.34 (ref 7.35–7.45)
PH ARTERIAL: 7.39 (ref 7.35–7.45)
PH ARTERIAL: 7.41 (ref 7.35–7.45)
PH ARTERIAL: 7.41 (ref 7.35–7.45)
PH ARTERIAL: 7.45 (ref 7.35–7.45)
PH ARTERIAL: 7.45 (ref 7.35–7.45)
PH UA: 5.5 (ref 5–8)
PH UA: 6.5
PH UA: 7 (ref 5–8)
PH VENOUS: 7.32 (ref 7.35–7.45)
PH VENOUS: 7.34 (ref 7.35–7.45)
PH VENOUS: 7.36 (ref 7.35–7.45)
PH VENOUS: 7.45 (ref 7.35–7.45)
PLATELET # BLD: 185 K/UL (ref 135–450)
PLATELET # BLD: 222 K/UL (ref 135–450)
PLATELET # BLD: 223 K/UL (ref 135–450)
PLATELET # BLD: 224 K/UL (ref 135–450)
PLATELET # BLD: 227 K/UL (ref 135–450)
PLATELET # BLD: 230 K/UL (ref 135–450)
PLATELET # BLD: 232 K/UL (ref 135–450)
PLATELET # BLD: 241 K/UL (ref 135–450)
PLATELET # BLD: 249 K/UL (ref 135–450)
PLATELET # BLD: 252 K/UL (ref 135–450)
PLATELET # BLD: 255 K/UL (ref 135–450)
PLATELET # BLD: 271 K/UL (ref 135–450)
PLATELET # BLD: 272 K/UL (ref 135–450)
PLATELET # BLD: 275 K/UL (ref 135–450)
PLATELET # BLD: 293 K/UL (ref 135–450)
PLATELET # BLD: 330 K/UL (ref 135–450)
PMV BLD AUTO: 8.2 FL (ref 5–10.5)
PMV BLD AUTO: 8.3 FL (ref 5–10.5)
PMV BLD AUTO: 8.4 FL (ref 5–10.5)
PMV BLD AUTO: 8.4 FL (ref 5–10.5)
PMV BLD AUTO: 8.5 FL (ref 5–10.5)
PMV BLD AUTO: 8.6 FL (ref 5–10.5)
PMV BLD AUTO: 8.7 FL (ref 5–10.5)
PMV BLD AUTO: 8.7 FL (ref 5–10.5)
PMV BLD AUTO: 8.8 FL (ref 5–10.5)
PMV BLD AUTO: 9 FL (ref 5–10.5)
PMV BLD AUTO: 9.3 FL (ref 5–10.5)
PMV BLD AUTO: 9.9 FL (ref 5–10.5)
PO2 ARTERIAL: 109.1 MMHG (ref 75–108)
PO2 ARTERIAL: 111.6 MMHG (ref 75–108)
PO2 ARTERIAL: 115.5 MMHG (ref 75–108)
PO2 ARTERIAL: 151 MMHG (ref 75–108)
PO2 ARTERIAL: 83.6 MMHG (ref 75–108)
PO2 ARTERIAL: 89.2 MMHG (ref 75–108)
PO2 ARTERIAL: 90.1 MMHG (ref 75–108)
PO2, VEN: 31.7 MMHG (ref 25–40)
PO2, VEN: 32.6 MMHG (ref 25–40)
PO2, VEN: 38.2 MMHG (ref 25–40)
PO2, VEN: 56 MMHG
POTASSIUM REFLEX MAGNESIUM: 3.8 MMOL/L (ref 3.5–5.1)
POTASSIUM REFLEX MAGNESIUM: 4.1 MMOL/L (ref 3.5–5.1)
POTASSIUM REFLEX MAGNESIUM: 4.2 MMOL/L (ref 3.5–5.1)
POTASSIUM REFLEX MAGNESIUM: 4.2 MMOL/L (ref 3.5–5.1)
POTASSIUM REFLEX MAGNESIUM: 4.4 MMOL/L (ref 3.5–5.1)
POTASSIUM REFLEX MAGNESIUM: 4.4 MMOL/L (ref 3.5–5.1)
POTASSIUM REFLEX MAGNESIUM: 4.6 MMOL/L (ref 3.5–5.1)
POTASSIUM REFLEX MAGNESIUM: 4.7 MMOL/L (ref 3.5–5.1)
POTASSIUM SERPL-SCNC: 3.7 MMOL/L (ref 3.5–5.1)
POTASSIUM SERPL-SCNC: 3.9 MMOL/L (ref 3.5–5.1)
POTASSIUM SERPL-SCNC: 4.1 MMOL/L (ref 3.5–5.1)
POTASSIUM SERPL-SCNC: 4.1 MMOL/L (ref 3.5–5.1)
POTASSIUM SERPL-SCNC: 4.3 MMOL/L (ref 3.5–5.1)
POTASSIUM SERPL-SCNC: 4.4 MMOL/L (ref 3.5–5.1)
POTASSIUM SERPL-SCNC: 4.4 MMOL/L (ref 3.5–5.1)
POTASSIUM SERPL-SCNC: 4.5 MMOL/L (ref 3.5–5.1)
POTASSIUM SERPL-SCNC: 4.6 MMOL/L (ref 3.5–5.1)
POTASSIUM SERPL-SCNC: 5.4 MMOL/L (ref 3.5–5.1)
POTASSIUM SERPL-SCNC: 6.1 MMOL/L (ref 3.5–5.1)
PRO-BNP: 122 PG/ML (ref 0–449)
PRO-BNP: 136 PG/ML (ref 0–449)
PRO-BNP: 153 PG/ML (ref 0–449)
PRO-BNP: 52 PG/ML (ref 0–449)
PRO-BNP: 65 PG/ML (ref 0–449)
PRO-BNP: 89 PG/ML (ref 0–449)
PRO-BNP: 97 PG/ML (ref 0–449)
PROCALCITONIN: 0.07 NG/ML (ref 0–0.15)
PROTEIN UA: NEGATIVE MG/DL
PROTHROMBIN TIME: 10 SEC (ref 9.8–13)
PROTHROMBIN TIME: 10.2 SEC (ref 9.8–13)
PROTHROMBIN TIME: 11.5 SEC (ref 9.8–13)
RAPID INFLUENZA  B AGN: NEGATIVE
RAPID INFLUENZA A AGN: NEGATIVE
RBC # BLD: 2.94 M/UL (ref 4.2–5.9)
RBC # BLD: 3 M/UL (ref 4.2–5.9)
RBC # BLD: 3.12 M/UL (ref 4.2–5.9)
RBC # BLD: 3.13 M/UL (ref 4.2–5.9)
RBC # BLD: 3.16 M/UL (ref 4.2–5.9)
RBC # BLD: 3.18 M/UL (ref 4.2–5.9)
RBC # BLD: 3.46 M/UL (ref 4.2–5.9)
RBC # BLD: 3.47 M/UL (ref 4.2–5.9)
RBC # BLD: 3.49 M/UL (ref 4.2–5.9)
RBC # BLD: 3.54 M/UL (ref 4.2–5.9)
RBC # BLD: 3.64 M/UL (ref 4.2–5.9)
RBC # BLD: 3.66 M/UL (ref 4.2–5.9)
RBC # BLD: 3.72 M/UL (ref 4.2–5.9)
RBC # BLD: 3.74 M/UL (ref 4.2–5.9)
RBC # BLD: 3.77 M/UL (ref 4.2–5.9)
RBC # BLD: 4.07 M/UL (ref 4.2–5.9)
SODIUM BLD-SCNC: 131 MMOL/L (ref 136–145)
SODIUM BLD-SCNC: 133 MMOL/L (ref 136–145)
SODIUM BLD-SCNC: 136 MMOL/L (ref 136–145)
SODIUM BLD-SCNC: 137 MMOL/L (ref 136–145)
SODIUM BLD-SCNC: 138 MMOL/L (ref 136–145)
SODIUM BLD-SCNC: 138 MMOL/L (ref 136–145)
SODIUM BLD-SCNC: 139 MMOL/L (ref 136–145)
SODIUM BLD-SCNC: 140 MMOL/L (ref 136–145)
SODIUM BLD-SCNC: 141 MMOL/L (ref 136–145)
SODIUM BLD-SCNC: 144 MMOL/L (ref 136–145)
SODIUM BLD-SCNC: 144 MMOL/L (ref 136–145)
SODIUM BLD-SCNC: 145 MMOL/L (ref 136–145)
SODIUM BLD-SCNC: 146 MMOL/L (ref 136–145)
SPECIFIC GRAVITY UA: 1.01 (ref 1–1.03)
SPECIFIC GRAVITY UA: 1.02 (ref 1–1.03)
SPECIFIC GRAVITY UA: <=1.005
SPECIMEN STATUS: NORMAL
TCO2 ARTERIAL: 40.9 MMOL/L
TCO2 ARTERIAL: 45.5 MMOL/L
TCO2 ARTERIAL: 45.6 MMOL/L
TCO2 ARTERIAL: 46.6 MMOL/L
TCO2 ARTERIAL: 49.2 MMOL/L
TCO2 ARTERIAL: 51.4 MMOL/L
TCO2 ARTERIAL: 53.7 MMOL/L
TCO2 CALC VENOUS: 49 MMOL/L
TCO2 CALC VENOUS: 51 MMOL/L
TCO2 CALC VENOUS: 59 MMOL/L
TCO2 CALC VENOUS: 61 MMOL/L
THEOPHYLLINE LEVEL: 3.7 UG/ML (ref 8–20)
TOTAL PROTEIN: 5.9 G/DL (ref 6.4–8.2)
TOTAL PROTEIN: 6.7 G/DL (ref 6.4–8.2)
TOTAL PROTEIN: 6.8 G/DL (ref 6.4–8.2)
TOTAL PROTEIN: 7 G/DL (ref 6.4–8.2)
TOTAL PROTEIN: 7 G/DL (ref 6.4–8.2)
TOTAL PROTEIN: 7.1 G/DL (ref 6.4–8.2)
TOTAL PROTEIN: 7.2 G/DL (ref 6.4–8.2)
TOTAL PROTEIN: 7.3 G/DL (ref 6.4–8.2)
TROPONIN: 0.02 NG/ML
TROPONIN: <0.01 NG/ML
URINE REFLEX TO CULTURE: ABNORMAL
URINE REFLEX TO CULTURE: NORMAL
URINE REFLEX TO CULTURE: NORMAL
URINE TYPE: ABNORMAL
URINE TYPE: NORMAL
URINE TYPE: NORMAL
UROBILINOGEN, URINE: 0.2 E.U./DL
UROBILINOGEN, URINE: 0.2 E.U./DL
UROBILINOGEN, URINE: 1 E.U./DL
WBC # BLD: 10.6 K/UL (ref 4–11)
WBC # BLD: 10.6 K/UL (ref 4–11)
WBC # BLD: 10.8 K/UL (ref 4–11)
WBC # BLD: 11.5 K/UL (ref 4–11)
WBC # BLD: 11.6 K/UL (ref 4–11)
WBC # BLD: 12.4 K/UL (ref 4–11)
WBC # BLD: 12.7 K/UL (ref 4–11)
WBC # BLD: 12.7 K/UL (ref 4–11)
WBC # BLD: 16 K/UL (ref 4–11)
WBC # BLD: 7 K/UL (ref 4–11)
WBC # BLD: 7.3 K/UL (ref 4–11)
WBC # BLD: 7.5 K/UL (ref 4–11)
WBC # BLD: 8.7 K/UL (ref 4–11)
WBC # BLD: 9.6 K/UL (ref 4–11)
WBC # BLD: 9.8 K/UL (ref 4–11)
WBC # BLD: 9.8 K/UL (ref 4–11)

## 2019-01-01 PROCEDURE — G8427 DOCREV CUR MEDS BY ELIG CLIN: HCPCS | Performed by: FAMILY MEDICINE

## 2019-01-01 PROCEDURE — 85025 COMPLETE CBC W/AUTO DIFF WBC: CPT

## 2019-01-01 PROCEDURE — 4040F PNEUMOC VAC/ADMIN/RCVD: CPT | Performed by: FAMILY MEDICINE

## 2019-01-01 PROCEDURE — 36415 COLL VENOUS BLD VENIPUNCTURE: CPT

## 2019-01-01 PROCEDURE — 80053 COMPREHEN METABOLIC PANEL: CPT

## 2019-01-01 PROCEDURE — 2580000003 HC RX 258: Performed by: INTERNAL MEDICINE

## 2019-01-01 PROCEDURE — 96374 THER/PROPH/DIAG INJ IV PUSH: CPT

## 2019-01-01 PROCEDURE — 99285 EMERGENCY DEPT VISIT HI MDM: CPT

## 2019-01-01 PROCEDURE — 6360000002 HC RX W HCPCS: Performed by: INTERNAL MEDICINE

## 2019-01-01 PROCEDURE — 85610 PROTHROMBIN TIME: CPT

## 2019-01-01 PROCEDURE — 1111F DSCHRG MED/CURRENT MED MERGE: CPT | Performed by: FAMILY MEDICINE

## 2019-01-01 PROCEDURE — 2700000000 HC OXYGEN THERAPY PER DAY

## 2019-01-01 PROCEDURE — 3017F COLORECTAL CA SCREEN DOC REV: CPT | Performed by: FAMILY MEDICINE

## 2019-01-01 PROCEDURE — 6370000000 HC RX 637 (ALT 250 FOR IP): Performed by: INTERNAL MEDICINE

## 2019-01-01 PROCEDURE — 6360000002 HC RX W HCPCS

## 2019-01-01 PROCEDURE — 83605 ASSAY OF LACTIC ACID: CPT

## 2019-01-01 PROCEDURE — 99214 OFFICE O/P EST MOD 30 MIN: CPT | Performed by: FAMILY MEDICINE

## 2019-01-01 PROCEDURE — 6370000000 HC RX 637 (ALT 250 FOR IP): Performed by: EMERGENCY MEDICINE

## 2019-01-01 PROCEDURE — 97162 PT EVAL MOD COMPLEX 30 MIN: CPT | Performed by: PHYSICAL THERAPIST

## 2019-01-01 PROCEDURE — 99495 TRANSJ CARE MGMT MOD F2F 14D: CPT | Performed by: FAMILY MEDICINE

## 2019-01-01 PROCEDURE — 96375 TX/PRO/DX INJ NEW DRUG ADDON: CPT

## 2019-01-01 PROCEDURE — 84484 ASSAY OF TROPONIN QUANT: CPT

## 2019-01-01 PROCEDURE — 97166 OT EVAL MOD COMPLEX 45 MIN: CPT

## 2019-01-01 PROCEDURE — 97110 THERAPEUTIC EXERCISES: CPT

## 2019-01-01 PROCEDURE — 94761 N-INVAS EAR/PLS OXIMETRY MLT: CPT

## 2019-01-01 PROCEDURE — 96365 THER/PROPH/DIAG IV INF INIT: CPT

## 2019-01-01 PROCEDURE — 93005 ELECTROCARDIOGRAM TRACING: CPT | Performed by: EMERGENCY MEDICINE

## 2019-01-01 PROCEDURE — 1123F ACP DISCUSS/DSCN MKR DOCD: CPT | Performed by: FAMILY MEDICINE

## 2019-01-01 PROCEDURE — 85027 COMPLETE CBC AUTOMATED: CPT

## 2019-01-01 PROCEDURE — 2000000000 HC ICU R&B

## 2019-01-01 PROCEDURE — 1111F DSCHRG MED/CURRENT MED MERGE: CPT

## 2019-01-01 PROCEDURE — 1101F PT FALLS ASSESS-DOCD LE1/YR: CPT | Performed by: FAMILY MEDICINE

## 2019-01-01 PROCEDURE — 80048 BASIC METABOLIC PNL TOTAL CA: CPT

## 2019-01-01 PROCEDURE — 97161 PT EVAL LOW COMPLEX 20 MIN: CPT

## 2019-01-01 PROCEDURE — 99233 SBSQ HOSP IP/OBS HIGH 50: CPT | Performed by: INTERNAL MEDICINE

## 2019-01-01 PROCEDURE — G8926 SPIRO NO PERF OR DOC: HCPCS | Performed by: FAMILY MEDICINE

## 2019-01-01 PROCEDURE — 94660 CPAP INITIATION&MGMT: CPT

## 2019-01-01 PROCEDURE — 97116 GAIT TRAINING THERAPY: CPT

## 2019-01-01 PROCEDURE — 93010 ELECTROCARDIOGRAM REPORT: CPT | Performed by: INTERNAL MEDICINE

## 2019-01-01 PROCEDURE — 82803 BLOOD GASES ANY COMBINATION: CPT

## 2019-01-01 PROCEDURE — 94640 AIRWAY INHALATION TREATMENT: CPT

## 2019-01-01 PROCEDURE — 84132 ASSAY OF SERUM POTASSIUM: CPT

## 2019-01-01 PROCEDURE — 93970 EXTREMITY STUDY: CPT

## 2019-01-01 PROCEDURE — 80162 ASSAY OF DIGOXIN TOTAL: CPT

## 2019-01-01 PROCEDURE — 3023F SPIROM DOC REV: CPT | Performed by: FAMILY MEDICINE

## 2019-01-01 PROCEDURE — 2060000000 HC ICU INTERMEDIATE R&B

## 2019-01-01 PROCEDURE — 99232 SBSQ HOSP IP/OBS MODERATE 35: CPT | Performed by: INTERNAL MEDICINE

## 2019-01-01 PROCEDURE — 6360000002 HC RX W HCPCS: Performed by: HOSPITALIST

## 2019-01-01 PROCEDURE — 97162 PT EVAL MOD COMPLEX 30 MIN: CPT

## 2019-01-01 PROCEDURE — 97530 THERAPEUTIC ACTIVITIES: CPT

## 2019-01-01 PROCEDURE — 99291 CRITICAL CARE FIRST HOUR: CPT

## 2019-01-01 PROCEDURE — 4004F PT TOBACCO SCREEN RCVD TLK: CPT | Performed by: FAMILY MEDICINE

## 2019-01-01 PROCEDURE — 71045 X-RAY EXAM CHEST 1 VIEW: CPT

## 2019-01-01 PROCEDURE — 93005 ELECTROCARDIOGRAM TRACING: CPT | Performed by: PHYSICIAN ASSISTANT

## 2019-01-01 PROCEDURE — 1200000000 HC SEMI PRIVATE

## 2019-01-01 PROCEDURE — 99291 CRITICAL CARE FIRST HOUR: CPT | Performed by: INTERNAL MEDICINE

## 2019-01-01 PROCEDURE — 83880 ASSAY OF NATRIURETIC PEPTIDE: CPT

## 2019-01-01 PROCEDURE — G8599 NO ASA/ANTIPLAT THER USE RNG: HCPCS | Performed by: FAMILY MEDICINE

## 2019-01-01 PROCEDURE — 94150 VITAL CAPACITY TEST: CPT

## 2019-01-01 PROCEDURE — 99406 BEHAV CHNG SMOKING 3-10 MIN: CPT

## 2019-01-01 PROCEDURE — 6370000000 HC RX 637 (ALT 250 FOR IP): Performed by: HOSPITALIST

## 2019-01-01 PROCEDURE — 80198 ASSAY OF THEOPHYLLINE: CPT

## 2019-01-01 PROCEDURE — 6360000002 HC RX W HCPCS: Performed by: PHYSICIAN ASSISTANT

## 2019-01-01 PROCEDURE — 36600 WITHDRAWAL OF ARTERIAL BLOOD: CPT

## 2019-01-01 PROCEDURE — 83735 ASSAY OF MAGNESIUM: CPT

## 2019-01-01 PROCEDURE — 92611 MOTION FLUOROSCOPY/SWALLOW: CPT

## 2019-01-01 PROCEDURE — 6360000002 HC RX W HCPCS: Performed by: EMERGENCY MEDICINE

## 2019-01-01 PROCEDURE — 97116 GAIT TRAINING THERAPY: CPT | Performed by: PHYSICAL THERAPIST

## 2019-01-01 PROCEDURE — 71046 X-RAY EXAM CHEST 2 VIEWS: CPT

## 2019-01-01 PROCEDURE — 85730 THROMBOPLASTIN TIME PARTIAL: CPT

## 2019-01-01 PROCEDURE — 2580000003 HC RX 258: Performed by: EMERGENCY MEDICINE

## 2019-01-01 PROCEDURE — 6370000000 HC RX 637 (ALT 250 FOR IP): Performed by: PHYSICIAN ASSISTANT

## 2019-01-01 PROCEDURE — 99223 1ST HOSP IP/OBS HIGH 75: CPT | Performed by: INTERNAL MEDICINE

## 2019-01-01 PROCEDURE — G8598 ASA/ANTIPLAT THER USED: HCPCS | Performed by: FAMILY MEDICINE

## 2019-01-01 PROCEDURE — 6370000000 HC RX 637 (ALT 250 FOR IP): Performed by: NURSE PRACTITIONER

## 2019-01-01 PROCEDURE — 36415 COLL VENOUS BLD VENIPUNCTURE: CPT | Performed by: FAMILY MEDICINE

## 2019-01-01 PROCEDURE — G8482 FLU IMMUNIZE ORDER/ADMIN: HCPCS | Performed by: FAMILY MEDICINE

## 2019-01-01 PROCEDURE — 81003 URINALYSIS AUTO W/O SCOPE: CPT

## 2019-01-01 PROCEDURE — 92610 EVALUATE SWALLOWING FUNCTION: CPT

## 2019-01-01 PROCEDURE — 2580000003 HC RX 258: Performed by: HOSPITALIST

## 2019-01-01 PROCEDURE — 94760 N-INVAS EAR/PLS OXIMETRY 1: CPT

## 2019-01-01 PROCEDURE — 1111F DSCHRG MED/CURRENT MED MERGE: CPT | Performed by: PHARMACIST

## 2019-01-01 PROCEDURE — 2500000003 HC RX 250 WO HCPCS: Performed by: INTERNAL MEDICINE

## 2019-01-01 PROCEDURE — 2580000003 HC RX 258: Performed by: PHYSICIAN ASSISTANT

## 2019-01-01 PROCEDURE — 96361 HYDRATE IV INFUSION ADD-ON: CPT

## 2019-01-01 PROCEDURE — 96367 TX/PROPH/DG ADDL SEQ IV INF: CPT

## 2019-01-01 PROCEDURE — 87040 BLOOD CULTURE FOR BACTERIA: CPT

## 2019-01-01 PROCEDURE — 97535 SELF CARE MNGMENT TRAINING: CPT

## 2019-01-01 PROCEDURE — 92526 ORAL FUNCTION THERAPY: CPT

## 2019-01-01 PROCEDURE — G8419 CALC BMI OUT NRM PARAM NOF/U: HCPCS | Performed by: FAMILY MEDICINE

## 2019-01-01 PROCEDURE — 84145 PROCALCITONIN (PCT): CPT

## 2019-01-01 PROCEDURE — 94762 N-INVAS EAR/PLS OXIMTRY CONT: CPT

## 2019-01-01 PROCEDURE — 87804 INFLUENZA ASSAY W/OPTIC: CPT

## 2019-01-01 PROCEDURE — 74230 X-RAY XM SWLNG FUNCJ C+: CPT

## 2019-01-01 PROCEDURE — 99239 HOSP IP/OBS DSCHRG MGMT >30: CPT | Performed by: INTERNAL MEDICINE

## 2019-01-01 PROCEDURE — 85379 FIBRIN DEGRADATION QUANT: CPT

## 2019-01-01 PROCEDURE — 6360000002 HC RX W HCPCS: Performed by: NURSE PRACTITIONER

## 2019-01-01 RX ORDER — IPRATROPIUM BROMIDE AND ALBUTEROL SULFATE 2.5; .5 MG/3ML; MG/3ML
1 SOLUTION RESPIRATORY (INHALATION)
Status: DISCONTINUED | OUTPATIENT
Start: 2019-01-01 | End: 2019-01-01

## 2019-01-01 RX ORDER — FUROSEMIDE 20 MG/1
TABLET ORAL
Qty: 30 TABLET | Refills: 11 | Status: SHIPPED | OUTPATIENT
Start: 2019-01-01 | End: 2019-01-01 | Stop reason: SDUPTHER

## 2019-01-01 RX ORDER — SODIUM CHLORIDE 0.9 % (FLUSH) 0.9 %
10 SYRINGE (ML) INJECTION EVERY 12 HOURS SCHEDULED
Status: DISCONTINUED | OUTPATIENT
Start: 2019-01-01 | End: 2019-01-01 | Stop reason: HOSPADM

## 2019-01-01 RX ORDER — POTASSIUM CHLORIDE 20 MEQ/1
20 TABLET, EXTENDED RELEASE ORAL DAILY
Qty: 30 TABLET | Refills: 1 | Status: SHIPPED | OUTPATIENT
Start: 2019-01-01 | End: 2019-01-01 | Stop reason: SDUPTHER

## 2019-01-01 RX ORDER — METHYLPREDNISOLONE SODIUM SUCCINATE 125 MG/2ML
125 INJECTION, POWDER, LYOPHILIZED, FOR SOLUTION INTRAMUSCULAR; INTRAVENOUS ONCE
Status: COMPLETED | OUTPATIENT
Start: 2019-01-01 | End: 2019-01-01

## 2019-01-01 RX ORDER — FUROSEMIDE 40 MG/1
40 TABLET ORAL 2 TIMES DAILY
Status: DISCONTINUED | OUTPATIENT
Start: 2019-01-01 | End: 2019-01-01 | Stop reason: HOSPADM

## 2019-01-01 RX ORDER — IPRATROPIUM BROMIDE AND ALBUTEROL SULFATE 2.5; .5 MG/3ML; MG/3ML
1 SOLUTION RESPIRATORY (INHALATION) EVERY 4 HOURS PRN
Status: DISCONTINUED | OUTPATIENT
Start: 2019-01-01 | End: 2019-01-01 | Stop reason: HOSPADM

## 2019-01-01 RX ORDER — DILTIAZEM HYDROCHLORIDE 120 MG/1
120 CAPSULE, EXTENDED RELEASE ORAL DAILY
Status: ON HOLD | COMMUNITY
End: 2019-01-01 | Stop reason: ALTCHOICE

## 2019-01-01 RX ORDER — OXYCODONE HYDROCHLORIDE 15 MG/1
15 TABLET, FILM COATED, EXTENDED RELEASE ORAL 2 TIMES DAILY
Status: DISCONTINUED | OUTPATIENT
Start: 2019-01-01 | End: 2019-01-01 | Stop reason: HOSPADM

## 2019-01-01 RX ORDER — LORAZEPAM 2 MG/ML
INJECTION INTRAMUSCULAR
Status: COMPLETED
Start: 2019-01-01 | End: 2019-01-01

## 2019-01-01 RX ORDER — POTASSIUM CHLORIDE 20 MEQ/1
40 TABLET, EXTENDED RELEASE ORAL PRN
Status: DISCONTINUED | OUTPATIENT
Start: 2019-01-01 | End: 2019-01-01 | Stop reason: HOSPADM

## 2019-01-01 RX ORDER — ACLIDINIUM BROMIDE 400 UG/1
POWDER, METERED RESPIRATORY (INHALATION)
Qty: 1 EACH | Refills: 11 | Status: SHIPPED | OUTPATIENT
Start: 2019-01-01

## 2019-01-01 RX ORDER — ASPIRIN 81 MG/1
81 TABLET, CHEWABLE ORAL DAILY
Status: DISCONTINUED | OUTPATIENT
Start: 2019-01-01 | End: 2019-01-01 | Stop reason: HOSPADM

## 2019-01-01 RX ORDER — OXYCODONE AND ACETAMINOPHEN 10; 325 MG/1; MG/1
1 TABLET ORAL EVERY 6 HOURS PRN
Status: DISCONTINUED | OUTPATIENT
Start: 2019-01-01 | End: 2019-01-01 | Stop reason: HOSPADM

## 2019-01-01 RX ORDER — AZITHROMYCIN 250 MG/1
250 TABLET, FILM COATED ORAL SEE ADMIN INSTRUCTIONS
Qty: 6 TABLET | Refills: 0 | Status: SHIPPED | OUTPATIENT
Start: 2019-01-01 | End: 2019-01-01

## 2019-01-01 RX ORDER — PREDNISONE 20 MG/1
40 TABLET ORAL DAILY
Qty: 6 TABLET | Refills: 0 | Status: SHIPPED | OUTPATIENT
Start: 2019-01-01 | End: 2019-01-01

## 2019-01-01 RX ORDER — AZITHROMYCIN 250 MG/1
250 TABLET, FILM COATED ORAL DAILY
Status: DISCONTINUED | OUTPATIENT
Start: 2019-01-01 | End: 2019-01-01 | Stop reason: HOSPADM

## 2019-01-01 RX ORDER — ONDANSETRON 2 MG/ML
4 INJECTION INTRAMUSCULAR; INTRAVENOUS EVERY 6 HOURS PRN
Status: DISCONTINUED | OUTPATIENT
Start: 2019-01-01 | End: 2019-01-01 | Stop reason: HOSPADM

## 2019-01-01 RX ORDER — IPRATROPIUM BROMIDE AND ALBUTEROL SULFATE 2.5; .5 MG/3ML; MG/3ML
1 SOLUTION RESPIRATORY (INHALATION) EVERY 4 HOURS
Status: DISCONTINUED | OUTPATIENT
Start: 2019-01-01 | End: 2019-01-01

## 2019-01-01 RX ORDER — PREDNISONE 50 MG/1
50 TABLET ORAL DAILY
Qty: 3 TABLET | Refills: 0 | Status: SHIPPED | OUTPATIENT
Start: 2019-01-01 | End: 2019-01-01

## 2019-01-01 RX ORDER — FUROSEMIDE 40 MG/1
40 TABLET ORAL 2 TIMES DAILY
Qty: 60 TABLET | Refills: 0 | Status: ON HOLD | OUTPATIENT
Start: 2019-01-01 | End: 2019-01-01 | Stop reason: SDUPTHER

## 2019-01-01 RX ORDER — AZITHROMYCIN 250 MG/1
250 TABLET, FILM COATED ORAL DAILY
Qty: 2 TABLET | Refills: 0 | Status: SHIPPED | OUTPATIENT
Start: 2019-01-01 | End: 2019-01-01

## 2019-01-01 RX ORDER — ATORVASTATIN CALCIUM 40 MG/1
40 TABLET, FILM COATED ORAL DAILY
Status: DISCONTINUED | OUTPATIENT
Start: 2019-01-01 | End: 2019-01-01 | Stop reason: HOSPADM

## 2019-01-01 RX ORDER — LORAZEPAM 2 MG/ML
1 INJECTION INTRAMUSCULAR ONCE
Status: COMPLETED | OUTPATIENT
Start: 2019-01-01 | End: 2019-01-01

## 2019-01-01 RX ORDER — PREDNISONE 20 MG/1
TABLET ORAL
Qty: 18 TABLET | Refills: 0 | Status: SHIPPED | OUTPATIENT
Start: 2019-01-01 | End: 2019-01-01 | Stop reason: SDUPTHER

## 2019-01-01 RX ORDER — METHYLPREDNISOLONE SODIUM SUCCINATE 40 MG/ML
40 INJECTION, POWDER, LYOPHILIZED, FOR SOLUTION INTRAMUSCULAR; INTRAVENOUS EVERY 6 HOURS
Status: DISCONTINUED | OUTPATIENT
Start: 2019-01-01 | End: 2019-01-01 | Stop reason: HOSPADM

## 2019-01-01 RX ORDER — METHYLPREDNISOLONE SODIUM SUCCINATE 40 MG/ML
40 INJECTION, POWDER, LYOPHILIZED, FOR SOLUTION INTRAMUSCULAR; INTRAVENOUS EVERY 12 HOURS
Status: DISCONTINUED | OUTPATIENT
Start: 2019-01-01 | End: 2019-01-01

## 2019-01-01 RX ORDER — FUROSEMIDE 10 MG/ML
40 INJECTION INTRAMUSCULAR; INTRAVENOUS 2 TIMES DAILY
Status: DISCONTINUED | OUTPATIENT
Start: 2019-01-01 | End: 2019-01-01 | Stop reason: HOSPADM

## 2019-01-01 RX ORDER — BUSPIRONE HYDROCHLORIDE 5 MG/1
20 TABLET ORAL 3 TIMES DAILY
Status: DISCONTINUED | OUTPATIENT
Start: 2019-01-01 | End: 2019-01-01 | Stop reason: HOSPADM

## 2019-01-01 RX ORDER — FORMOTEROL FUMARATE 20 UG/2ML
20 SOLUTION RESPIRATORY (INHALATION) 2 TIMES DAILY
Status: DISCONTINUED | OUTPATIENT
Start: 2019-01-01 | End: 2019-01-01 | Stop reason: HOSPADM

## 2019-01-01 RX ORDER — BUSPIRONE HYDROCHLORIDE 5 MG/1
10 TABLET ORAL 3 TIMES DAILY
Status: DISCONTINUED | OUTPATIENT
Start: 2019-01-01 | End: 2019-01-01

## 2019-01-01 RX ORDER — IPRATROPIUM BROMIDE AND ALBUTEROL SULFATE 2.5; .5 MG/3ML; MG/3ML
1 SOLUTION RESPIRATORY (INHALATION) ONCE
Status: COMPLETED | OUTPATIENT
Start: 2019-01-01 | End: 2019-01-01

## 2019-01-01 RX ORDER — SODIUM CHLORIDE 0.9 % (FLUSH) 0.9 %
10 SYRINGE (ML) INJECTION PRN
Status: DISCONTINUED | OUTPATIENT
Start: 2019-01-01 | End: 2019-01-01 | Stop reason: HOSPADM

## 2019-01-01 RX ORDER — METHYLPREDNISOLONE SODIUM SUCCINATE 125 MG/2ML
60 INJECTION, POWDER, LYOPHILIZED, FOR SOLUTION INTRAMUSCULAR; INTRAVENOUS EVERY 6 HOURS
Status: DISCONTINUED | OUTPATIENT
Start: 2019-01-01 | End: 2019-01-01

## 2019-01-01 RX ORDER — 0.9 % SODIUM CHLORIDE 0.9 %
500 INTRAVENOUS SOLUTION INTRAVENOUS ONCE
Status: COMPLETED | OUTPATIENT
Start: 2019-01-01 | End: 2019-01-01

## 2019-01-01 RX ORDER — ACETAMINOPHEN 325 MG/1
650 TABLET ORAL EVERY 4 HOURS PRN
Status: DISCONTINUED | OUTPATIENT
Start: 2019-01-01 | End: 2019-01-01 | Stop reason: HOSPADM

## 2019-01-01 RX ORDER — ALBUTEROL SULFATE 90 UG/1
2 AEROSOL, METERED RESPIRATORY (INHALATION)
Status: DISCONTINUED | OUTPATIENT
Start: 2019-01-01 | End: 2019-01-01

## 2019-01-01 RX ORDER — PREDNISONE 20 MG/1
40 TABLET ORAL DAILY
Status: DISCONTINUED | OUTPATIENT
Start: 2019-01-01 | End: 2019-01-01 | Stop reason: HOSPADM

## 2019-01-01 RX ORDER — PANTOPRAZOLE SODIUM 40 MG/1
40 TABLET, DELAYED RELEASE ORAL
Status: DISCONTINUED | OUTPATIENT
Start: 2019-01-01 | End: 2019-01-01 | Stop reason: HOSPADM

## 2019-01-01 RX ORDER — BUSPIRONE HYDROCHLORIDE 10 MG/1
10 TABLET ORAL 3 TIMES DAILY
COMMUNITY

## 2019-01-01 RX ORDER — DULOXETIN HYDROCHLORIDE 60 MG/1
60 CAPSULE, DELAYED RELEASE ORAL DAILY
Qty: 60 CAPSULE | Refills: 11 | Status: SHIPPED
Start: 2019-01-01

## 2019-01-01 RX ORDER — METHYLPREDNISOLONE 4 MG/1
TABLET ORAL
Qty: 1 KIT | Refills: 0 | Status: SHIPPED | OUTPATIENT
Start: 2019-01-01 | End: 2019-01-01

## 2019-01-01 RX ORDER — FUROSEMIDE 10 MG/ML
40 INJECTION INTRAMUSCULAR; INTRAVENOUS ONCE
Status: COMPLETED | OUTPATIENT
Start: 2019-01-01 | End: 2019-01-01

## 2019-01-01 RX ORDER — ASPIRIN 325 MG
325 TABLET ORAL ONCE
Status: COMPLETED | OUTPATIENT
Start: 2019-01-01 | End: 2019-01-01

## 2019-01-01 RX ORDER — ACETAMINOPHEN 325 MG/1
650 TABLET ORAL EVERY 4 HOURS PRN
Status: DISCONTINUED | OUTPATIENT
Start: 2019-01-01 | End: 2019-01-01

## 2019-01-01 RX ORDER — DOXYCYCLINE HYCLATE 100 MG
100 TABLET ORAL 2 TIMES DAILY
Qty: 20 TABLET | Refills: 0 | Status: SHIPPED | OUTPATIENT
Start: 2019-01-01 | End: 2019-01-01 | Stop reason: SDUPTHER

## 2019-01-01 RX ORDER — BUSPIRONE HYDROCHLORIDE 5 MG/1
10 TABLET ORAL 3 TIMES DAILY
Status: DISCONTINUED | OUTPATIENT
Start: 2019-01-01 | End: 2019-01-01 | Stop reason: HOSPADM

## 2019-01-01 RX ORDER — FUROSEMIDE 40 MG/1
TABLET ORAL
Qty: 30 TABLET | Refills: 1 | Status: SHIPPED | OUTPATIENT
Start: 2019-01-01 | End: 2019-01-01 | Stop reason: SDUPTHER

## 2019-01-01 RX ORDER — HYDRALAZINE HYDROCHLORIDE 20 MG/ML
10 INJECTION INTRAMUSCULAR; INTRAVENOUS EVERY 6 HOURS PRN
Status: DISCONTINUED | OUTPATIENT
Start: 2019-01-01 | End: 2019-01-01 | Stop reason: HOSPADM

## 2019-01-01 RX ORDER — DILTIAZEM HYDROCHLORIDE 120 MG/1
120 CAPSULE, EXTENDED RELEASE ORAL DAILY
Qty: 30 CAPSULE | Refills: 0
Start: 2019-01-01

## 2019-01-01 RX ORDER — POTASSIUM CHLORIDE 750 MG/1
20 TABLET, EXTENDED RELEASE ORAL DAILY
Status: DISCONTINUED | OUTPATIENT
Start: 2019-01-01 | End: 2019-01-01 | Stop reason: HOSPADM

## 2019-01-01 RX ORDER — PREDNISONE 20 MG/1
60 TABLET ORAL ONCE
Status: COMPLETED | OUTPATIENT
Start: 2019-01-01 | End: 2019-01-01

## 2019-01-01 RX ORDER — AZITHROMYCIN 500 MG/1
500 TABLET, FILM COATED ORAL DAILY
Status: COMPLETED | OUTPATIENT
Start: 2019-01-01 | End: 2019-01-01

## 2019-01-01 RX ORDER — FLUTICASONE PROPIONATE 50 MCG
1 SPRAY, SUSPENSION (ML) NASAL DAILY
Status: DISCONTINUED | OUTPATIENT
Start: 2019-01-01 | End: 2019-01-01 | Stop reason: HOSPADM

## 2019-01-01 RX ORDER — METHYLPREDNISOLONE SODIUM SUCCINATE 40 MG/ML
40 INJECTION, POWDER, LYOPHILIZED, FOR SOLUTION INTRAMUSCULAR; INTRAVENOUS DAILY
Status: DISCONTINUED | OUTPATIENT
Start: 2019-01-01 | End: 2019-01-01

## 2019-01-01 RX ORDER — METHYLPREDNISOLONE SODIUM SUCCINATE 40 MG/ML
40 INJECTION, POWDER, LYOPHILIZED, FOR SOLUTION INTRAMUSCULAR; INTRAVENOUS EVERY 12 HOURS
Status: DISCONTINUED | OUTPATIENT
Start: 2019-01-01 | End: 2019-01-01 | Stop reason: HOSPADM

## 2019-01-01 RX ORDER — MORPHINE SULFATE 15 MG/1
TABLET, FILM COATED, EXTENDED RELEASE ORAL
Refills: 0 | COMMUNITY
Start: 2019-01-01

## 2019-01-01 RX ORDER — PREDNISONE 20 MG/1
40 TABLET ORAL DAILY
Status: DISCONTINUED | OUTPATIENT
Start: 2019-01-01 | End: 2019-01-01

## 2019-01-01 RX ORDER — ALBUTEROL SULFATE 90 UG/1
2 AEROSOL, METERED RESPIRATORY (INHALATION) EVERY 4 HOURS PRN
Status: DISCONTINUED | OUTPATIENT
Start: 2019-01-01 | End: 2019-01-01

## 2019-01-01 RX ORDER — ALBUTEROL SULFATE 90 UG/1
2 AEROSOL, METERED RESPIRATORY (INHALATION)
Status: DISCONTINUED | OUTPATIENT
Start: 2019-01-01 | End: 2019-01-01 | Stop reason: HOSPADM

## 2019-01-01 RX ORDER — DULOXETIN HYDROCHLORIDE 60 MG/1
60 CAPSULE, DELAYED RELEASE ORAL DAILY
Status: DISCONTINUED | OUTPATIENT
Start: 2019-01-01 | End: 2019-01-01 | Stop reason: HOSPADM

## 2019-01-01 RX ORDER — PREDNISONE 10 MG/1
40 TABLET ORAL DAILY
Status: DISCONTINUED | OUTPATIENT
Start: 2019-01-01 | End: 2019-01-01 | Stop reason: HOSPADM

## 2019-01-01 RX ORDER — DOXYCYCLINE HYCLATE 100 MG
100 TABLET ORAL ONCE
Status: DISCONTINUED | OUTPATIENT
Start: 2019-01-01 | End: 2019-01-01

## 2019-01-01 RX ORDER — BENZONATATE 100 MG/1
100 CAPSULE ORAL 2 TIMES DAILY PRN
Qty: 20 CAPSULE | Refills: 0 | Status: SHIPPED | OUTPATIENT
Start: 2019-01-01 | End: 2019-01-01

## 2019-01-01 RX ORDER — DOXYCYCLINE HYCLATE 100 MG/1
100 CAPSULE ORAL 2 TIMES DAILY
Qty: 20 CAPSULE | Refills: 0 | Status: SHIPPED | OUTPATIENT
Start: 2019-01-01 | End: 2019-01-01

## 2019-01-01 RX ORDER — BUDESONIDE 0.5 MG/2ML
0.5 INHALANT ORAL 2 TIMES DAILY
Status: DISCONTINUED | OUTPATIENT
Start: 2019-01-01 | End: 2019-01-01 | Stop reason: HOSPADM

## 2019-01-01 RX ORDER — OXYCODONE HCL 10 MG/1
10 TABLET, FILM COATED, EXTENDED RELEASE ORAL EVERY 12 HOURS
Status: DISCONTINUED | OUTPATIENT
Start: 2019-01-01 | End: 2019-01-01 | Stop reason: HOSPADM

## 2019-01-01 RX ORDER — HYDROCODONE BITARTRATE AND ACETAMINOPHEN 5; 325 MG/1; MG/1
1 TABLET ORAL 3 TIMES DAILY
Status: DISCONTINUED | OUTPATIENT
Start: 2019-01-01 | End: 2019-01-01 | Stop reason: HOSPADM

## 2019-01-01 RX ORDER — BUPROPION HYDROCHLORIDE 150 MG/1
TABLET, EXTENDED RELEASE ORAL
Qty: 60 TABLET | Refills: 11
Start: 2019-01-01 | End: 2019-01-01

## 2019-01-01 RX ORDER — DILTIAZEM HYDROCHLORIDE 120 MG/1
120 CAPSULE, COATED, EXTENDED RELEASE ORAL DAILY
Status: DISCONTINUED | OUTPATIENT
Start: 2019-01-01 | End: 2019-01-01 | Stop reason: HOSPADM

## 2019-01-01 RX ORDER — OXYCODONE HCL 10 MG/1
10 TABLET, FILM COATED, EXTENDED RELEASE ORAL EVERY 6 HOURS PRN
COMMUNITY

## 2019-01-01 RX ORDER — LORAZEPAM 2 MG/ML
0.5 INJECTION INTRAMUSCULAR EVERY 6 HOURS PRN
Status: DISCONTINUED | OUTPATIENT
Start: 2019-01-01 | End: 2019-01-01 | Stop reason: HOSPADM

## 2019-01-01 RX ORDER — BUSPIRONE HYDROCHLORIDE 10 MG/1
10 TABLET ORAL 3 TIMES DAILY
Qty: 30 TABLET | Refills: 2 | Status: SHIPPED | OUTPATIENT
Start: 2019-01-01 | End: 2019-01-01 | Stop reason: SINTOL

## 2019-01-01 RX ORDER — DOXYCYCLINE HYCLATE 100 MG
100 TABLET ORAL 2 TIMES DAILY
Qty: 8 TABLET | Refills: 0 | Status: SHIPPED | OUTPATIENT
Start: 2019-01-01 | End: 2019-01-01

## 2019-01-01 RX ORDER — NICOTINE 21 MG/24HR
1 PATCH, TRANSDERMAL 24 HOURS TRANSDERMAL DAILY
Qty: 30 PATCH | Refills: 3 | Status: SHIPPED | OUTPATIENT
Start: 2019-01-01 | End: 2019-01-01

## 2019-01-01 RX ORDER — DOXYCYCLINE HYCLATE 100 MG
100 TABLET ORAL EVERY 12 HOURS SCHEDULED
Status: DISCONTINUED | OUTPATIENT
Start: 2019-01-01 | End: 2019-01-01 | Stop reason: HOSPADM

## 2019-01-01 RX ORDER — BUSPIRONE HYDROCHLORIDE 10 MG/1
10 TABLET ORAL 3 TIMES DAILY
Qty: 30 TABLET | Refills: 2 | Status: SHIPPED | OUTPATIENT
Start: 2019-01-01 | End: 2019-01-01 | Stop reason: SDUPTHER

## 2019-01-01 RX ORDER — PREDNISONE 20 MG/1
40 TABLET ORAL DAILY
Status: DISCONTINUED | OUTPATIENT
Start: 2019-01-01 | End: 2019-01-01 | Stop reason: SDUPTHER

## 2019-01-01 RX ORDER — IPRATROPIUM BROMIDE AND ALBUTEROL SULFATE 2.5; .5 MG/3ML; MG/3ML
1 SOLUTION RESPIRATORY (INHALATION) ONCE
Status: DISCONTINUED | OUTPATIENT
Start: 2019-01-01 | End: 2019-01-01

## 2019-01-01 RX ORDER — ALBUTEROL SULFATE 90 UG/1
2 AEROSOL, METERED RESPIRATORY (INHALATION) EVERY 4 HOURS PRN
Status: DISCONTINUED | OUTPATIENT
Start: 2019-01-01 | End: 2019-01-01 | Stop reason: HOSPADM

## 2019-01-01 RX ORDER — MORPHINE SULFATE 20 MG/ML
SOLUTION ORAL
Refills: 0 | COMMUNITY
Start: 2019-01-01

## 2019-01-01 RX ORDER — DULOXETIN HYDROCHLORIDE 60 MG/1
60 CAPSULE, DELAYED RELEASE ORAL 2 TIMES DAILY
Status: DISCONTINUED | OUTPATIENT
Start: 2019-01-01 | End: 2019-01-01 | Stop reason: HOSPADM

## 2019-01-01 RX ORDER — METHYLPREDNISOLONE SODIUM SUCCINATE 40 MG/ML
40 INJECTION, POWDER, LYOPHILIZED, FOR SOLUTION INTRAMUSCULAR; INTRAVENOUS ONCE
Status: COMPLETED | OUTPATIENT
Start: 2019-01-01 | End: 2019-01-01

## 2019-01-01 RX ORDER — MAGNESIUM SULFATE 1 G/100ML
1 INJECTION INTRAVENOUS PRN
Status: DISCONTINUED | OUTPATIENT
Start: 2019-01-01 | End: 2019-01-01 | Stop reason: HOSPADM

## 2019-01-01 RX ORDER — FUROSEMIDE 40 MG/1
TABLET ORAL
Qty: 30 TABLET | Refills: 11 | Status: ON HOLD | OUTPATIENT
Start: 2019-01-01 | End: 2019-01-01 | Stop reason: SDUPTHER

## 2019-01-01 RX ORDER — ALBUTEROL SULFATE 2.5 MG/3ML
5 SOLUTION RESPIRATORY (INHALATION) ONCE
Status: COMPLETED | OUTPATIENT
Start: 2019-01-01 | End: 2019-01-01

## 2019-01-01 RX ORDER — SENNOSIDES 8.6 MG
TABLET ORAL 2 TIMES DAILY
Refills: 0 | COMMUNITY
Start: 2019-01-01

## 2019-01-01 RX ORDER — IPRATROPIUM BROMIDE AND ALBUTEROL SULFATE 2.5; .5 MG/3ML; MG/3ML
SOLUTION RESPIRATORY (INHALATION)
Status: DISCONTINUED
Start: 2019-01-01 | End: 2019-01-01 | Stop reason: HOSPADM

## 2019-01-01 RX ORDER — POTASSIUM CHLORIDE 7.45 MG/ML
10 INJECTION INTRAVENOUS PRN
Status: DISCONTINUED | OUTPATIENT
Start: 2019-01-01 | End: 2019-01-01 | Stop reason: HOSPADM

## 2019-01-01 RX ORDER — IPRATROPIUM BROMIDE AND ALBUTEROL SULFATE 2.5; .5 MG/3ML; MG/3ML
1 SOLUTION RESPIRATORY (INHALATION) 4 TIMES DAILY
Status: DISCONTINUED | OUTPATIENT
Start: 2019-01-01 | End: 2019-01-01 | Stop reason: HOSPADM

## 2019-01-01 RX ORDER — PREDNISONE 20 MG/1
TABLET ORAL
Qty: 15 TABLET | Refills: 0 | Status: ON HOLD | OUTPATIENT
Start: 2019-01-01 | End: 2019-01-01

## 2019-01-01 RX ORDER — LEVOFLOXACIN 5 MG/ML
750 INJECTION, SOLUTION INTRAVENOUS ONCE
Status: COMPLETED | OUTPATIENT
Start: 2019-01-01 | End: 2019-01-01

## 2019-01-01 RX ORDER — LORAZEPAM 0.5 MG/1
TABLET ORAL
Refills: 0 | COMMUNITY
Start: 2019-01-01

## 2019-01-01 RX ORDER — HYDROCODONE BITARTRATE AND ACETAMINOPHEN 5; 325 MG/1; MG/1
1 TABLET ORAL EVERY 6 HOURS PRN
Status: DISCONTINUED | OUTPATIENT
Start: 2019-01-01 | End: 2019-01-01

## 2019-01-01 RX ORDER — IPRATROPIUM BROMIDE AND ALBUTEROL SULFATE 2.5; .5 MG/3ML; MG/3ML
2 SOLUTION RESPIRATORY (INHALATION) ONCE
Status: COMPLETED | OUTPATIENT
Start: 2019-01-01 | End: 2019-01-01

## 2019-01-01 RX ORDER — IPRATROPIUM BROMIDE AND ALBUTEROL SULFATE 2.5; .5 MG/3ML; MG/3ML
1 SOLUTION RESPIRATORY (INHALATION)
Status: DISCONTINUED | OUTPATIENT
Start: 2019-01-01 | End: 2019-01-01 | Stop reason: HOSPADM

## 2019-01-01 RX ORDER — FLUTICASONE PROPIONATE 50 MCG
2 SPRAY, SUSPENSION (ML) NASAL DAILY
Status: DISCONTINUED | OUTPATIENT
Start: 2019-01-01 | End: 2019-01-01 | Stop reason: HOSPADM

## 2019-01-01 RX ORDER — PREDNISONE 20 MG/1
TABLET ORAL
Qty: 18 TABLET | Refills: 0 | Status: ON HOLD | OUTPATIENT
Start: 2019-01-01 | End: 2019-01-01

## 2019-01-01 RX ORDER — PROCHLORPERAZINE EDISYLATE 5 MG/ML
10 INJECTION INTRAMUSCULAR; INTRAVENOUS EVERY 6 HOURS PRN
Status: DISCONTINUED | OUTPATIENT
Start: 2019-01-01 | End: 2019-01-01 | Stop reason: HOSPADM

## 2019-01-01 RX ORDER — BUSPIRONE HYDROCHLORIDE 10 MG/1
20 TABLET ORAL 3 TIMES DAILY
Qty: 90 TABLET | Refills: 5 | Status: SHIPPED | OUTPATIENT
Start: 2019-01-01 | End: 2019-01-01 | Stop reason: SINTOL

## 2019-01-01 RX ORDER — POTASSIUM CHLORIDE 20 MEQ/1
20 TABLET, EXTENDED RELEASE ORAL DAILY
Qty: 30 TABLET | Refills: 11 | Status: SHIPPED | OUTPATIENT
Start: 2019-01-01

## 2019-01-01 RX ORDER — BUDESONIDE AND FORMOTEROL FUMARATE DIHYDRATE 160; 4.5 UG/1; UG/1
AEROSOL RESPIRATORY (INHALATION)
Qty: 10.2 G | Refills: 11 | Status: SHIPPED | OUTPATIENT
Start: 2019-01-01

## 2019-01-01 RX ORDER — PREDNISONE 10 MG/1
TABLET ORAL
Qty: 30 TABLET | Refills: 0 | Status: SHIPPED | OUTPATIENT
Start: 2019-01-01 | End: 2019-01-01 | Stop reason: ALTCHOICE

## 2019-01-01 RX ORDER — DILTIAZEM HYDROCHLORIDE 120 MG/1
120 CAPSULE, EXTENDED RELEASE ORAL 2 TIMES DAILY
COMMUNITY
End: 2019-01-01

## 2019-01-01 RX ORDER — BUPROPION HYDROCHLORIDE 150 MG/1
TABLET, EXTENDED RELEASE ORAL
Qty: 60 TABLET | Refills: 11 | Status: SHIPPED | OUTPATIENT
Start: 2019-01-01 | End: 2019-01-01 | Stop reason: SDUPTHER

## 2019-01-01 RX ORDER — CHOLECALCIFEROL (VITAMIN D3) 125 MCG
5 CAPSULE ORAL NIGHTLY PRN
Status: DISCONTINUED | OUTPATIENT
Start: 2019-01-01 | End: 2019-01-01 | Stop reason: HOSPADM

## 2019-01-01 RX ORDER — NICOTINE 21 MG/24HR
1 PATCH, TRANSDERMAL 24 HOURS TRANSDERMAL DAILY
Status: DISCONTINUED | OUTPATIENT
Start: 2019-01-01 | End: 2019-01-01 | Stop reason: HOSPADM

## 2019-01-01 RX ORDER — SODIUM CHLORIDE 9 MG/ML
INJECTION, SOLUTION INTRAVENOUS CONTINUOUS
Status: DISCONTINUED | OUTPATIENT
Start: 2019-01-01 | End: 2019-01-01

## 2019-01-01 RX ORDER — DIPHENHYDRAMINE HCL 25 MG
25 TABLET ORAL NIGHTLY PRN
Status: DISCONTINUED | OUTPATIENT
Start: 2019-01-01 | End: 2019-01-01 | Stop reason: HOSPADM

## 2019-01-01 RX ORDER — SENNA AND DOCUSATE SODIUM 50; 8.6 MG/1; MG/1
2 TABLET, FILM COATED ORAL DAILY PRN
Status: DISCONTINUED | OUTPATIENT
Start: 2019-01-01 | End: 2019-01-01 | Stop reason: HOSPADM

## 2019-01-01 RX ORDER — LOSARTAN POTASSIUM 100 MG/1
100 TABLET ORAL DAILY
Status: DISCONTINUED | OUTPATIENT
Start: 2019-01-01 | End: 2019-01-01 | Stop reason: HOSPADM

## 2019-01-01 RX ORDER — METHYLPREDNISOLONE SODIUM SUCCINATE 40 MG/ML
40 INJECTION, POWDER, LYOPHILIZED, FOR SOLUTION INTRAMUSCULAR; INTRAVENOUS EVERY 12 HOURS
Status: DISPENSED | OUTPATIENT
Start: 2019-01-01 | End: 2019-01-01

## 2019-01-01 RX ORDER — MAGNESIUM SULFATE IN WATER 40 MG/ML
2 INJECTION, SOLUTION INTRAVENOUS ONCE
Status: COMPLETED | OUTPATIENT
Start: 2019-01-01 | End: 2019-01-01

## 2019-01-01 RX ORDER — FLUTICASONE PROPIONATE 50 MCG
SPRAY, SUSPENSION (ML) NASAL
Qty: 16 G | Refills: 11 | Status: SHIPPED | OUTPATIENT
Start: 2019-01-01

## 2019-01-01 RX ORDER — FUROSEMIDE 10 MG/ML
20 INJECTION INTRAMUSCULAR; INTRAVENOUS ONCE
Status: COMPLETED | OUTPATIENT
Start: 2019-01-01 | End: 2019-01-01

## 2019-01-01 RX ORDER — MORPHINE SULFATE 4 MG/ML
INJECTION, SOLUTION INTRAMUSCULAR; INTRAVENOUS
Status: COMPLETED
Start: 2019-01-01 | End: 2019-01-01

## 2019-01-01 RX ORDER — ALBUTEROL SULFATE 2.5 MG/3ML
2.5 SOLUTION RESPIRATORY (INHALATION)
Status: DISCONTINUED | OUTPATIENT
Start: 2019-01-01 | End: 2019-01-01

## 2019-01-01 RX ORDER — FUROSEMIDE 40 MG/1
40 TABLET ORAL DAILY
Qty: 30 TABLET | Refills: 0
Start: 2019-01-01

## 2019-01-01 RX ORDER — POTASSIUM CHLORIDE 20 MEQ/1
20 TABLET, EXTENDED RELEASE ORAL DAILY
Qty: 30 TABLET | Refills: 0 | Status: ON HOLD | OUTPATIENT
Start: 2019-01-01 | End: 2019-01-01 | Stop reason: HOSPADM

## 2019-01-01 RX ORDER — METHYLPREDNISOLONE SODIUM SUCCINATE 125 MG/2ML
125 INJECTION, POWDER, LYOPHILIZED, FOR SOLUTION INTRAMUSCULAR; INTRAVENOUS ONCE
Status: DISCONTINUED | OUTPATIENT
Start: 2019-01-01 | End: 2019-01-01

## 2019-01-01 RX ORDER — MORPHINE SULFATE 4 MG/ML
4 INJECTION, SOLUTION INTRAMUSCULAR; INTRAVENOUS ONCE
Status: COMPLETED | OUTPATIENT
Start: 2019-01-01 | End: 2019-01-01

## 2019-01-01 RX ORDER — METHYLPREDNISOLONE 4 MG/1
4 TABLET ORAL SEE ADMIN INSTRUCTIONS
Status: ON HOLD | COMMUNITY
End: 2019-01-01 | Stop reason: HOSPADM

## 2019-01-01 RX ORDER — DEXAMETHASONE SODIUM PHOSPHATE 10 MG/ML
10 INJECTION INTRAMUSCULAR; INTRAVENOUS ONCE
Status: COMPLETED | OUTPATIENT
Start: 2019-01-01 | End: 2019-01-01

## 2019-01-01 RX ORDER — DOXYCYCLINE HYCLATE 100 MG
100 TABLET ORAL EVERY 12 HOURS SCHEDULED
Qty: 6 TABLET | Refills: 0 | Status: SHIPPED | OUTPATIENT
Start: 2019-01-01 | End: 2019-01-01

## 2019-01-01 RX ORDER — PREDNISONE 20 MG/1
TABLET ORAL
Qty: 15 TABLET | Refills: 0 | Status: SHIPPED | OUTPATIENT
Start: 2019-01-01 | End: 2019-01-01

## 2019-01-01 RX ORDER — FUROSEMIDE 40 MG/1
40 TABLET ORAL DAILY
Status: DISCONTINUED | OUTPATIENT
Start: 2019-01-01 | End: 2019-01-01

## 2019-01-01 RX ORDER — FUROSEMIDE 10 MG/ML
20 INJECTION INTRAMUSCULAR; INTRAVENOUS ONCE
Status: DISCONTINUED | OUTPATIENT
Start: 2019-01-01 | End: 2019-01-01

## 2019-01-01 RX ORDER — FUROSEMIDE 40 MG/1
TABLET ORAL
Qty: 60 TABLET | Refills: 0 | Status: ON HOLD | OUTPATIENT
Start: 2019-01-01 | End: 2019-01-01 | Stop reason: HOSPADM

## 2019-01-01 RX ADMIN — Medication 2 PUFF: at 10:56

## 2019-01-01 RX ADMIN — FUROSEMIDE 40 MG: 40 TABLET ORAL at 16:48

## 2019-01-01 RX ADMIN — ASPIRIN 325 MG: 325 TABLET ORAL at 16:14

## 2019-01-01 RX ADMIN — IPRATROPIUM BROMIDE AND ALBUTEROL SULFATE 1 AMPULE: .5; 3 SOLUTION RESPIRATORY (INHALATION) at 21:11

## 2019-01-01 RX ADMIN — DULOXETINE HYDROCHLORIDE 60 MG: 60 CAPSULE, DELAYED RELEASE ORAL at 08:55

## 2019-01-01 RX ADMIN — PREDNISONE 40 MG: 10 TABLET ORAL at 09:35

## 2019-01-01 RX ADMIN — OXYCODONE HYDROCHLORIDE AND ACETAMINOPHEN 1 TABLET: 10; 325 TABLET ORAL at 08:04

## 2019-01-01 RX ADMIN — PANTOPRAZOLE SODIUM 40 MG: 40 TABLET, DELAYED RELEASE ORAL at 07:47

## 2019-01-01 RX ADMIN — IPRATROPIUM BROMIDE AND ALBUTEROL SULFATE 1 AMPULE: .5; 3 SOLUTION RESPIRATORY (INHALATION) at 12:35

## 2019-01-01 RX ADMIN — LORAZEPAM 0.5 MG: 2 INJECTION INTRAMUSCULAR at 10:39

## 2019-01-01 RX ADMIN — IPRATROPIUM BROMIDE AND ALBUTEROL SULFATE 2 AMPULE: .5; 3 SOLUTION RESPIRATORY (INHALATION) at 20:34

## 2019-01-01 RX ADMIN — IPRATROPIUM BROMIDE AND ALBUTEROL SULFATE 1 AMPULE: .5; 3 SOLUTION RESPIRATORY (INHALATION) at 08:00

## 2019-01-01 RX ADMIN — MUPIROCIN: 20 OINTMENT TOPICAL at 09:40

## 2019-01-01 RX ADMIN — IPRATROPIUM BROMIDE AND ALBUTEROL SULFATE 1 AMPULE: .5; 3 SOLUTION RESPIRATORY (INHALATION) at 08:59

## 2019-01-01 RX ADMIN — BUSPIRONE HYDROCHLORIDE 20 MG: 5 TABLET ORAL at 21:39

## 2019-01-01 RX ADMIN — Medication 2 PUFF: at 19:44

## 2019-01-01 RX ADMIN — IPRATROPIUM BROMIDE AND ALBUTEROL SULFATE 1 AMPULE: .5; 3 SOLUTION RESPIRATORY (INHALATION) at 16:19

## 2019-01-01 RX ADMIN — OXYCODONE HYDROCHLORIDE 10 MG: 10 TABLET, FILM COATED, EXTENDED RELEASE ORAL at 09:55

## 2019-01-01 RX ADMIN — DIPHENHYDRAMINE HCL 25 MG: 25 TABLET ORAL at 02:37

## 2019-01-01 RX ADMIN — FORMOTEROL FUMARATE DIHYDRATE 20 MCG: 20 SOLUTION RESPIRATORY (INHALATION) at 08:50

## 2019-01-01 RX ADMIN — DILTIAZEM HYDROCHLORIDE 120 MG: 120 CAPSULE, COATED, EXTENDED RELEASE ORAL at 09:40

## 2019-01-01 RX ADMIN — DULOXETINE HYDROCHLORIDE 60 MG: 60 CAPSULE, DELAYED RELEASE ORAL at 09:20

## 2019-01-01 RX ADMIN — PREDNISONE 40 MG: 10 TABLET ORAL at 09:23

## 2019-01-01 RX ADMIN — METHYLPREDNISOLONE SODIUM SUCCINATE 40 MG: 40 INJECTION, POWDER, LYOPHILIZED, FOR SOLUTION INTRAMUSCULAR; INTRAVENOUS at 07:11

## 2019-01-01 RX ADMIN — DULOXETINE HYDROCHLORIDE 60 MG: 60 CAPSULE, DELAYED RELEASE ORAL at 09:35

## 2019-01-01 RX ADMIN — BUSPIRONE HYDROCHLORIDE 20 MG: 5 TABLET ORAL at 20:17

## 2019-01-01 RX ADMIN — ASPIRIN 325 MG ORAL TABLET 325 MG: 325 PILL ORAL at 00:28

## 2019-01-01 RX ADMIN — ASPIRIN 81 MG 81 MG: 81 TABLET ORAL at 07:46

## 2019-01-01 RX ADMIN — FLUTICASONE PROPIONATE 1 SPRAY: 50 SPRAY, METERED NASAL at 08:41

## 2019-01-01 RX ADMIN — DILTIAZEM HYDROCHLORIDE 120 MG: 120 CAPSULE, COATED, EXTENDED RELEASE ORAL at 09:20

## 2019-01-01 RX ADMIN — DILTIAZEM HYDROCHLORIDE 120 MG: 120 CAPSULE, COATED, EXTENDED RELEASE ORAL at 08:56

## 2019-01-01 RX ADMIN — Medication 2 PUFF: at 15:24

## 2019-01-01 RX ADMIN — BISACODYL 10 MG: 5 TABLET, COATED ORAL at 09:54

## 2019-01-01 RX ADMIN — IPRATROPIUM BROMIDE AND ALBUTEROL SULFATE 1 AMPULE: .5; 3 SOLUTION RESPIRATORY (INHALATION) at 16:45

## 2019-01-01 RX ADMIN — Medication 2 PUFF: at 22:55

## 2019-01-01 RX ADMIN — OXYCODONE HYDROCHLORIDE AND ACETAMINOPHEN 1 TABLET: 10; 325 TABLET ORAL at 02:19

## 2019-01-01 RX ADMIN — OXYCODONE HYDROCHLORIDE AND ACETAMINOPHEN 1 TABLET: 10; 325 TABLET ORAL at 17:00

## 2019-01-01 RX ADMIN — ENOXAPARIN SODIUM 40 MG: 40 INJECTION SUBCUTANEOUS at 07:47

## 2019-01-01 RX ADMIN — OXYCODONE HYDROCHLORIDE 10 MG: 10 TABLET, FILM COATED, EXTENDED RELEASE ORAL at 22:49

## 2019-01-01 RX ADMIN — IPRATROPIUM BROMIDE AND ALBUTEROL SULFATE 1 AMPULE: .5; 3 SOLUTION RESPIRATORY (INHALATION) at 19:42

## 2019-01-01 RX ADMIN — METHYLPREDNISOLONE SODIUM SUCCINATE 40 MG: 40 INJECTION, POWDER, FOR SOLUTION INTRAMUSCULAR; INTRAVENOUS at 22:57

## 2019-01-01 RX ADMIN — BUSPIRONE HYDROCHLORIDE 20 MG: 5 TABLET ORAL at 08:39

## 2019-01-01 RX ADMIN — LOSARTAN POTASSIUM 100 MG: 100 TABLET, FILM COATED ORAL at 08:40

## 2019-01-01 RX ADMIN — SALINE NASAL SPRAY 1 SPRAY: 1.5 SOLUTION NASAL at 20:16

## 2019-01-01 RX ADMIN — IPRATROPIUM BROMIDE AND ALBUTEROL SULFATE 1 AMPULE: .5; 3 SOLUTION RESPIRATORY (INHALATION) at 20:13

## 2019-01-01 RX ADMIN — IPRATROPIUM BROMIDE AND ALBUTEROL SULFATE 2 AMPULE: .5; 3 SOLUTION RESPIRATORY (INHALATION) at 05:18

## 2019-01-01 RX ADMIN — ATORVASTATIN CALCIUM 40 MG: 40 TABLET, FILM COATED ORAL at 09:23

## 2019-01-01 RX ADMIN — DILTIAZEM HYDROCHLORIDE 120 MG: 120 CAPSULE, COATED, EXTENDED RELEASE ORAL at 15:32

## 2019-01-01 RX ADMIN — METHYLPREDNISOLONE SODIUM SUCCINATE 40 MG: 40 INJECTION, POWDER, LYOPHILIZED, FOR SOLUTION INTRAMUSCULAR; INTRAVENOUS at 05:47

## 2019-01-01 RX ADMIN — IPRATROPIUM BROMIDE AND ALBUTEROL SULFATE 1 AMPULE: .5; 3 SOLUTION RESPIRATORY (INHALATION) at 15:22

## 2019-01-01 RX ADMIN — Medication 10 ML: at 20:35

## 2019-01-01 RX ADMIN — AZITHROMYCIN MONOHYDRATE 250 MG: 250 TABLET ORAL at 11:35

## 2019-01-01 RX ADMIN — OXYCODONE HYDROCHLORIDE AND ACETAMINOPHEN 1 TABLET: 10; 325 TABLET ORAL at 20:50

## 2019-01-01 RX ADMIN — Medication 10 ML: at 09:14

## 2019-01-01 RX ADMIN — METHYLPREDNISOLONE SODIUM SUCCINATE 40 MG: 40 INJECTION, POWDER, LYOPHILIZED, FOR SOLUTION INTRAMUSCULAR; INTRAVENOUS at 19:45

## 2019-01-01 RX ADMIN — DOXYCYCLINE HYCLATE 100 MG: 100 TABLET, COATED ORAL at 20:56

## 2019-01-01 RX ADMIN — DILTIAZEM HYDROCHLORIDE 120 MG: 120 CAPSULE, COATED, EXTENDED RELEASE ORAL at 07:47

## 2019-01-01 RX ADMIN — FORMOTEROL FUMARATE DIHYDRATE 20 MCG: 20 SOLUTION RESPIRATORY (INHALATION) at 19:42

## 2019-01-01 RX ADMIN — Medication 10 ML: at 20:09

## 2019-01-01 RX ADMIN — DILTIAZEM HYDROCHLORIDE 120 MG: 120 CAPSULE, COATED, EXTENDED RELEASE ORAL at 08:04

## 2019-01-01 RX ADMIN — ENOXAPARIN SODIUM 40 MG: 40 INJECTION SUBCUTANEOUS at 09:04

## 2019-01-01 RX ADMIN — FLUTICASONE PROPIONATE 2 SPRAY: 50 SPRAY, METERED NASAL at 08:56

## 2019-01-01 RX ADMIN — BUSPIRONE HYDROCHLORIDE 20 MG: 5 TABLET ORAL at 07:47

## 2019-01-01 RX ADMIN — ASPIRIN 81 MG 81 MG: 81 TABLET ORAL at 15:31

## 2019-01-01 RX ADMIN — MUPIROCIN: 20 OINTMENT TOPICAL at 20:35

## 2019-01-01 RX ADMIN — Medication 10 ML: at 09:23

## 2019-01-01 RX ADMIN — SALINE NASAL SPRAY 1 SPRAY: 1.5 SOLUTION NASAL at 16:14

## 2019-01-01 RX ADMIN — OXYCODONE HYDROCHLORIDE AND ACETAMINOPHEN 1 TABLET: 10; 325 TABLET ORAL at 03:30

## 2019-01-01 RX ADMIN — Medication 2 PUFF: at 11:08

## 2019-01-01 RX ADMIN — Medication 10 ML: at 08:57

## 2019-01-01 RX ADMIN — LORAZEPAM 1 MG: 2 INJECTION INTRAMUSCULAR at 08:22

## 2019-01-01 RX ADMIN — IPRATROPIUM BROMIDE AND ALBUTEROL SULFATE 1 AMPULE: .5; 3 SOLUTION RESPIRATORY (INHALATION) at 06:39

## 2019-01-01 RX ADMIN — THEOPHYLLINE ANHYDROUS 100 MG: 100 CAPSULE, EXTENDED RELEASE ORAL at 09:20

## 2019-01-01 RX ADMIN — BUSPIRONE HYDROCHLORIDE 20 MG: 5 TABLET ORAL at 09:05

## 2019-01-01 RX ADMIN — PANTOPRAZOLE SODIUM 40 MG: 40 TABLET, DELAYED RELEASE ORAL at 09:20

## 2019-01-01 RX ADMIN — OXYCODONE HYDROCHLORIDE AND ACETAMINOPHEN 1 TABLET: 10; 325 TABLET ORAL at 11:47

## 2019-01-01 RX ADMIN — FUROSEMIDE 40 MG: 40 TABLET ORAL at 09:23

## 2019-01-01 RX ADMIN — Medication 2 PUFF: at 03:08

## 2019-01-01 RX ADMIN — AZITHROMYCIN 500 MG: 500 TABLET, FILM COATED ORAL at 09:13

## 2019-01-01 RX ADMIN — METHYLPREDNISOLONE SODIUM SUCCINATE 40 MG: 40 INJECTION, POWDER, FOR SOLUTION INTRAMUSCULAR; INTRAVENOUS at 06:49

## 2019-01-01 RX ADMIN — CEFTRIAXONE 1 G: 1 INJECTION, POWDER, FOR SOLUTION INTRAMUSCULAR; INTRAVENOUS at 06:03

## 2019-01-01 RX ADMIN — IPRATROPIUM BROMIDE AND ALBUTEROL SULFATE 1 AMPULE: .5; 3 SOLUTION RESPIRATORY (INHALATION) at 16:17

## 2019-01-01 RX ADMIN — DILTIAZEM HYDROCHLORIDE 120 MG: 120 CAPSULE, COATED, EXTENDED RELEASE ORAL at 08:48

## 2019-01-01 RX ADMIN — METHYLPREDNISOLONE SODIUM SUCCINATE 40 MG: 40 INJECTION, POWDER, FOR SOLUTION INTRAMUSCULAR; INTRAVENOUS at 13:18

## 2019-01-01 RX ADMIN — ENOXAPARIN SODIUM 40 MG: 40 INJECTION SUBCUTANEOUS at 15:29

## 2019-01-01 RX ADMIN — Medication 10 ML: at 07:11

## 2019-01-01 RX ADMIN — DULOXETINE HYDROCHLORIDE 60 MG: 60 CAPSULE, DELAYED RELEASE ORAL at 07:46

## 2019-01-01 RX ADMIN — METHYLPREDNISOLONE SODIUM SUCCINATE 40 MG: 40 INJECTION, POWDER, FOR SOLUTION INTRAMUSCULAR; INTRAVENOUS at 16:48

## 2019-01-01 RX ADMIN — ATORVASTATIN CALCIUM 40 MG: 40 TABLET, FILM COATED ORAL at 09:03

## 2019-01-01 RX ADMIN — POTASSIUM CHLORIDE 20 MEQ: 10 TABLET, EXTENDED RELEASE ORAL at 08:32

## 2019-01-01 RX ADMIN — ASPIRIN 81 MG 81 MG: 81 TABLET ORAL at 08:39

## 2019-01-01 RX ADMIN — METHYLPREDNISOLONE SODIUM SUCCINATE 40 MG: 40 INJECTION, POWDER, FOR SOLUTION INTRAMUSCULAR; INTRAVENOUS at 06:01

## 2019-01-01 RX ADMIN — DULOXETINE HYDROCHLORIDE 60 MG: 60 CAPSULE, DELAYED RELEASE ORAL at 09:24

## 2019-01-01 RX ADMIN — DULOXETINE HYDROCHLORIDE 60 MG: 60 CAPSULE, DELAYED RELEASE ORAL at 09:03

## 2019-01-01 RX ADMIN — ASPIRIN 81 MG 81 MG: 81 TABLET ORAL at 09:23

## 2019-01-01 RX ADMIN — METHYLPREDNISOLONE SODIUM SUCCINATE 40 MG: 40 INJECTION, POWDER, FOR SOLUTION INTRAMUSCULAR; INTRAVENOUS at 18:46

## 2019-01-01 RX ADMIN — Medication 2 PUFF: at 18:48

## 2019-01-01 RX ADMIN — LORAZEPAM 1 MG: 2 INJECTION INTRAMUSCULAR; INTRAVENOUS at 02:14

## 2019-01-01 RX ADMIN — OXYCODONE HYDROCHLORIDE 10 MG: 10 TABLET, FILM COATED, EXTENDED RELEASE ORAL at 11:19

## 2019-01-01 RX ADMIN — FUROSEMIDE 40 MG: 40 TABLET ORAL at 08:32

## 2019-01-01 RX ADMIN — LOSARTAN POTASSIUM 100 MG: 100 TABLET, FILM COATED ORAL at 09:19

## 2019-01-01 RX ADMIN — Medication 2 PUFF: at 14:52

## 2019-01-01 RX ADMIN — ASPIRIN 81 MG 81 MG: 81 TABLET ORAL at 09:03

## 2019-01-01 RX ADMIN — PREDNISONE 40 MG: 20 TABLET ORAL at 08:40

## 2019-01-01 RX ADMIN — OXYCODONE HYDROCHLORIDE 15 MG: 15 TABLET, FILM COATED, EXTENDED RELEASE ORAL at 20:40

## 2019-01-01 RX ADMIN — LORAZEPAM 1 MG: 2 INJECTION INTRAMUSCULAR; INTRAVENOUS at 08:22

## 2019-01-01 RX ADMIN — IPRATROPIUM BROMIDE AND ALBUTEROL SULFATE 1 AMPULE: .5; 3 SOLUTION RESPIRATORY (INHALATION) at 16:14

## 2019-01-01 RX ADMIN — DILTIAZEM HYDROCHLORIDE 120 MG: 120 CAPSULE, COATED, EXTENDED RELEASE ORAL at 09:04

## 2019-01-01 RX ADMIN — DEXAMETHASONE SODIUM PHOSPHATE 10 MG: 10 INJECTION, SOLUTION INTRAMUSCULAR; INTRAVENOUS at 23:47

## 2019-01-01 RX ADMIN — FUROSEMIDE 40 MG: 10 INJECTION, SOLUTION INTRAMUSCULAR; INTRAVENOUS at 10:26

## 2019-01-01 RX ADMIN — ENOXAPARIN SODIUM 40 MG: 40 INJECTION SUBCUTANEOUS at 09:13

## 2019-01-01 RX ADMIN — METHYLPREDNISOLONE SODIUM SUCCINATE 40 MG: 40 INJECTION, POWDER, FOR SOLUTION INTRAMUSCULAR; INTRAVENOUS at 18:00

## 2019-01-01 RX ADMIN — OXYCODONE HYDROCHLORIDE 15 MG: 15 TABLET, FILM COATED, EXTENDED RELEASE ORAL at 20:16

## 2019-01-01 RX ADMIN — DILTIAZEM HYDROCHLORIDE 120 MG: 120 CAPSULE, COATED, EXTENDED RELEASE ORAL at 09:36

## 2019-01-01 RX ADMIN — FUROSEMIDE 40 MG: 10 INJECTION, SOLUTION INTRAMUSCULAR; INTRAVENOUS at 19:19

## 2019-01-01 RX ADMIN — IPRATROPIUM BROMIDE AND ALBUTEROL SULFATE 1 AMPULE: .5; 3 SOLUTION RESPIRATORY (INHALATION) at 19:07

## 2019-01-01 RX ADMIN — Medication 2 PUFF: at 06:55

## 2019-01-01 RX ADMIN — ATORVASTATIN CALCIUM 40 MG: 40 TABLET, FILM COATED ORAL at 15:30

## 2019-01-01 RX ADMIN — IPRATROPIUM BROMIDE AND ALBUTEROL SULFATE 1 AMPULE: .5; 3 SOLUTION RESPIRATORY (INHALATION) at 11:47

## 2019-01-01 RX ADMIN — FUROSEMIDE 40 MG: 10 INJECTION, SOLUTION INTRAMUSCULAR; INTRAVENOUS at 19:24

## 2019-01-01 RX ADMIN — BUSPIRONE HYDROCHLORIDE 20 MG: 5 TABLET ORAL at 16:14

## 2019-01-01 RX ADMIN — FLUTICASONE PROPIONATE 1 SPRAY: 50 SPRAY, METERED NASAL at 11:47

## 2019-01-01 RX ADMIN — POTASSIUM CHLORIDE 20 MEQ: 10 TABLET, EXTENDED RELEASE ORAL at 08:56

## 2019-01-01 RX ADMIN — THEOPHYLLINE ANHYDROUS 100 MG: 100 CAPSULE, EXTENDED RELEASE ORAL at 08:40

## 2019-01-01 RX ADMIN — ASPIRIN 81 MG 81 MG: 81 TABLET ORAL at 08:04

## 2019-01-01 RX ADMIN — OXYCODONE HYDROCHLORIDE AND ACETAMINOPHEN 1 TABLET: 10; 325 TABLET ORAL at 15:29

## 2019-01-01 RX ADMIN — Medication 2 PUFF: at 11:13

## 2019-01-01 RX ADMIN — THEOPHYLLINE ANHYDROUS 100 MG: 100 CAPSULE, EXTENDED RELEASE ORAL at 20:41

## 2019-01-01 RX ADMIN — Medication 2 PUFF: at 22:46

## 2019-01-01 RX ADMIN — IPRATROPIUM BROMIDE AND ALBUTEROL SULFATE 1 AMPULE: .5; 3 SOLUTION RESPIRATORY (INHALATION) at 20:35

## 2019-01-01 RX ADMIN — BUDESONIDE 500 MCG: 0.5 SUSPENSION RESPIRATORY (INHALATION) at 19:43

## 2019-01-01 RX ADMIN — OXYCODONE HYDROCHLORIDE 10 MG: 10 TABLET, FILM COATED, EXTENDED RELEASE ORAL at 11:35

## 2019-01-01 RX ADMIN — OXYCODONE HYDROCHLORIDE AND ACETAMINOPHEN 1 TABLET: 10; 325 TABLET ORAL at 05:47

## 2019-01-01 RX ADMIN — FUROSEMIDE 20 MG: 10 INJECTION, SOLUTION INTRAMUSCULAR; INTRAVENOUS at 10:44

## 2019-01-01 RX ADMIN — Medication 10 ML: at 17:53

## 2019-01-01 RX ADMIN — OXYCODONE HYDROCHLORIDE 10 MG: 10 TABLET, FILM COATED, EXTENDED RELEASE ORAL at 22:09

## 2019-01-01 RX ADMIN — IPRATROPIUM BROMIDE AND ALBUTEROL SULFATE 1 AMPULE: .5; 3 SOLUTION RESPIRATORY (INHALATION) at 03:14

## 2019-01-01 RX ADMIN — Medication 10 ML: at 20:56

## 2019-01-01 RX ADMIN — DULOXETINE HYDROCHLORIDE 60 MG: 60 CAPSULE, DELAYED RELEASE ORAL at 20:44

## 2019-01-01 RX ADMIN — BUSPIRONE HYDROCHLORIDE 20 MG: 5 TABLET ORAL at 14:41

## 2019-01-01 RX ADMIN — Medication 10 ML: at 20:42

## 2019-01-01 RX ADMIN — LOSARTAN POTASSIUM 100 MG: 100 TABLET, FILM COATED ORAL at 09:05

## 2019-01-01 RX ADMIN — METHYLPREDNISOLONE SODIUM SUCCINATE 40 MG: 40 INJECTION, POWDER, FOR SOLUTION INTRAMUSCULAR; INTRAVENOUS at 06:24

## 2019-01-01 RX ADMIN — IPRATROPIUM BROMIDE AND ALBUTEROL SULFATE 1 AMPULE: .5; 3 SOLUTION RESPIRATORY (INHALATION) at 06:59

## 2019-01-01 RX ADMIN — IPRATROPIUM BROMIDE AND ALBUTEROL 1 PUFF: 20; 100 SPRAY, METERED RESPIRATORY (INHALATION) at 03:15

## 2019-01-01 RX ADMIN — Medication 10 ML: at 09:20

## 2019-01-01 RX ADMIN — OXYCODONE HYDROCHLORIDE 15 MG: 15 TABLET, FILM COATED, EXTENDED RELEASE ORAL at 12:51

## 2019-01-01 RX ADMIN — ACETAMINOPHEN 650 MG: 325 TABLET ORAL at 10:36

## 2019-01-01 RX ADMIN — HYDROCODONE BITARTRATE AND ACETAMINOPHEN 1 TABLET: 5; 325 TABLET ORAL at 20:03

## 2019-01-01 RX ADMIN — ENOXAPARIN SODIUM 40 MG: 40 INJECTION SUBCUTANEOUS at 08:39

## 2019-01-01 RX ADMIN — FORMOTEROL FUMARATE DIHYDRATE 20 MCG: 20 SOLUTION RESPIRATORY (INHALATION) at 20:35

## 2019-01-01 RX ADMIN — HYDROCODONE BITARTRATE AND ACETAMINOPHEN 1 TABLET: 5; 325 TABLET ORAL at 01:14

## 2019-01-01 RX ADMIN — ATORVASTATIN CALCIUM 40 MG: 40 TABLET, FILM COATED ORAL at 08:48

## 2019-01-01 RX ADMIN — AZITHROMYCIN DIHYDRATE 500 MG: 500 INJECTION, POWDER, LYOPHILIZED, FOR SOLUTION INTRAVENOUS at 03:52

## 2019-01-01 RX ADMIN — PREDNISONE 40 MG: 20 TABLET ORAL at 07:47

## 2019-01-01 RX ADMIN — Medication 2 PUFF: at 06:42

## 2019-01-01 RX ADMIN — ATORVASTATIN CALCIUM 40 MG: 40 TABLET, FILM COATED ORAL at 09:40

## 2019-01-01 RX ADMIN — DILTIAZEM HYDROCHLORIDE 120 MG: 120 CAPSULE, COATED, EXTENDED RELEASE ORAL at 08:32

## 2019-01-01 RX ADMIN — ENOXAPARIN SODIUM 40 MG: 100 INJECTION SUBCUTANEOUS at 09:35

## 2019-01-01 RX ADMIN — METHYLPREDNISOLONE SODIUM SUCCINATE 40 MG: 40 INJECTION, POWDER, FOR SOLUTION INTRAMUSCULAR; INTRAVENOUS at 20:35

## 2019-01-01 RX ADMIN — DULOXETINE HYDROCHLORIDE 60 MG: 60 CAPSULE, DELAYED RELEASE ORAL at 20:40

## 2019-01-01 RX ADMIN — BUDESONIDE 500 MCG: 0.5 SUSPENSION RESPIRATORY (INHALATION) at 10:40

## 2019-01-01 RX ADMIN — Medication 10 ML: at 09:52

## 2019-01-01 RX ADMIN — DULOXETINE HYDROCHLORIDE 60 MG: 60 CAPSULE, DELAYED RELEASE ORAL at 09:04

## 2019-01-01 RX ADMIN — MUPIROCIN: 20 OINTMENT TOPICAL at 10:44

## 2019-01-01 RX ADMIN — BUDESONIDE 500 MCG: 0.5 SUSPENSION RESPIRATORY (INHALATION) at 20:35

## 2019-01-01 RX ADMIN — FLUTICASONE PROPIONATE 1 SPRAY: 50 SPRAY, METERED NASAL at 09:05

## 2019-01-01 RX ADMIN — ATORVASTATIN CALCIUM 40 MG: 40 TABLET, FILM COATED ORAL at 09:47

## 2019-01-01 RX ADMIN — ENOXAPARIN SODIUM 40 MG: 100 INJECTION SUBCUTANEOUS at 09:37

## 2019-01-01 RX ADMIN — Medication 10 ML: at 09:36

## 2019-01-01 RX ADMIN — Medication 2 PUFF: at 10:24

## 2019-01-01 RX ADMIN — Medication 2 PUFF: at 20:13

## 2019-01-01 RX ADMIN — LORAZEPAM 1 MG: 2 INJECTION INTRAMUSCULAR; INTRAVENOUS at 00:56

## 2019-01-01 RX ADMIN — ENOXAPARIN SODIUM 40 MG: 40 INJECTION SUBCUTANEOUS at 09:20

## 2019-01-01 RX ADMIN — PANTOPRAZOLE SODIUM 40 MG: 40 TABLET, DELAYED RELEASE ORAL at 09:04

## 2019-01-01 RX ADMIN — FLUTICASONE PROPIONATE 1 SPRAY: 50 SPRAY, METERED NASAL at 07:46

## 2019-01-01 RX ADMIN — HYDROCODONE BITARTRATE AND ACETAMINOPHEN 1 TABLET: 5; 325 TABLET ORAL at 09:03

## 2019-01-01 RX ADMIN — ASPIRIN 81 MG 81 MG: 81 TABLET ORAL at 09:36

## 2019-01-01 RX ADMIN — ALBUTEROL SULFATE 5 MG: 2.5 SOLUTION RESPIRATORY (INHALATION) at 20:50

## 2019-01-01 RX ADMIN — IPRATROPIUM BROMIDE AND ALBUTEROL SULFATE 1 AMPULE: .5; 3 SOLUTION RESPIRATORY (INHALATION) at 10:43

## 2019-01-01 RX ADMIN — PANTOPRAZOLE SODIUM 40 MG: 40 TABLET, DELAYED RELEASE ORAL at 08:40

## 2019-01-01 RX ADMIN — ASPIRIN 81 MG 81 MG: 81 TABLET ORAL at 08:55

## 2019-01-01 RX ADMIN — FUROSEMIDE 40 MG: 40 TABLET ORAL at 08:55

## 2019-01-01 RX ADMIN — DOXYCYCLINE 100 MG: 100 INJECTION, POWDER, LYOPHILIZED, FOR SOLUTION INTRAVENOUS at 06:49

## 2019-01-01 RX ADMIN — BUSPIRONE HYDROCHLORIDE 20 MG: 5 TABLET ORAL at 20:40

## 2019-01-01 RX ADMIN — FUROSEMIDE 40 MG: 40 TABLET ORAL at 09:36

## 2019-01-01 RX ADMIN — SALINE NASAL SPRAY 1 SPRAY: 1.5 SOLUTION NASAL at 11:13

## 2019-01-01 RX ADMIN — ASPIRIN 81 MG 81 MG: 81 TABLET ORAL at 09:04

## 2019-01-01 RX ADMIN — DOXYCYCLINE 100 MG: 100 INJECTION, POWDER, LYOPHILIZED, FOR SOLUTION INTRAVENOUS at 08:49

## 2019-01-01 RX ADMIN — SALINE NASAL SPRAY 2 SPRAY: 1.5 SOLUTION NASAL at 09:47

## 2019-01-01 RX ADMIN — IPRATROPIUM BROMIDE AND ALBUTEROL SULFATE 1 AMPULE: .5; 3 SOLUTION RESPIRATORY (INHALATION) at 11:07

## 2019-01-01 RX ADMIN — Medication 2 PUFF: at 19:25

## 2019-01-01 RX ADMIN — BUSPIRONE HYDROCHLORIDE 20 MG: 5 TABLET ORAL at 15:03

## 2019-01-01 RX ADMIN — Medication 2 PUFF: at 23:00

## 2019-01-01 RX ADMIN — THEOPHYLLINE ANHYDROUS 100 MG: 100 CAPSULE, EXTENDED RELEASE ORAL at 15:30

## 2019-01-01 RX ADMIN — DOXYCYCLINE 100 MG: 100 INJECTION, POWDER, LYOPHILIZED, FOR SOLUTION INTRAVENOUS at 20:36

## 2019-01-01 RX ADMIN — Medication 2 PUFF: at 06:48

## 2019-01-01 RX ADMIN — ATORVASTATIN CALCIUM 40 MG: 40 TABLET, FILM COATED ORAL at 09:37

## 2019-01-01 RX ADMIN — DILTIAZEM HYDROCHLORIDE 120 MG: 120 CAPSULE, COATED, EXTENDED RELEASE ORAL at 09:03

## 2019-01-01 RX ADMIN — MUPIROCIN: 20 OINTMENT TOPICAL at 11:35

## 2019-01-01 RX ADMIN — LOSARTAN POTASSIUM 100 MG: 100 TABLET, FILM COATED ORAL at 15:32

## 2019-01-01 RX ADMIN — BUSPIRONE HYDROCHLORIDE 20 MG: 5 TABLET ORAL at 15:29

## 2019-01-01 RX ADMIN — ASPIRIN 81 MG 81 MG: 81 TABLET ORAL at 09:19

## 2019-01-01 RX ADMIN — Medication 2 PUFF: at 07:02

## 2019-01-01 RX ADMIN — AZITHROMYCIN MONOHYDRATE 250 MG: 250 TABLET ORAL at 08:31

## 2019-01-01 RX ADMIN — Medication 2 PUFF: at 19:30

## 2019-01-01 RX ADMIN — Medication 2 PUFF: at 07:13

## 2019-01-01 RX ADMIN — ACETAMINOPHEN 650 MG: 325 TABLET ORAL at 02:37

## 2019-01-01 RX ADMIN — ATORVASTATIN CALCIUM 40 MG: 40 TABLET, FILM COATED ORAL at 09:13

## 2019-01-01 RX ADMIN — METHYLPREDNISOLONE SODIUM SUCCINATE 40 MG: 40 INJECTION, POWDER, FOR SOLUTION INTRAMUSCULAR; INTRAVENOUS at 08:48

## 2019-01-01 RX ADMIN — ASPIRIN 81 MG 81 MG: 81 TABLET ORAL at 09:40

## 2019-01-01 RX ADMIN — THEOPHYLLINE ANHYDROUS 100 MG: 100 CAPSULE, EXTENDED RELEASE ORAL at 20:45

## 2019-01-01 RX ADMIN — DOXYCYCLINE 100 MG: 100 INJECTION, POWDER, LYOPHILIZED, FOR SOLUTION INTRAVENOUS at 17:51

## 2019-01-01 RX ADMIN — Medication 5 MG: at 21:25

## 2019-01-01 RX ADMIN — OXYCODONE HYDROCHLORIDE AND ACETAMINOPHEN 1 TABLET: 10; 325 TABLET ORAL at 08:53

## 2019-01-01 RX ADMIN — ASPIRIN 81 MG 81 MG: 81 TABLET ORAL at 08:32

## 2019-01-01 RX ADMIN — Medication 10 ML: at 08:49

## 2019-01-01 RX ADMIN — Medication 2 PUFF: at 15:14

## 2019-01-01 RX ADMIN — ACETAMINOPHEN 650 MG: 325 TABLET ORAL at 00:14

## 2019-01-01 RX ADMIN — OXYCODONE HYDROCHLORIDE 10 MG: 10 TABLET, FILM COATED, EXTENDED RELEASE ORAL at 22:17

## 2019-01-01 RX ADMIN — OXYCODONE HYDROCHLORIDE 10 MG: 10 TABLET, FILM COATED, EXTENDED RELEASE ORAL at 22:24

## 2019-01-01 RX ADMIN — Medication 10 ML: at 22:25

## 2019-01-01 RX ADMIN — DULOXETINE HYDROCHLORIDE 60 MG: 60 CAPSULE, DELAYED RELEASE ORAL at 08:04

## 2019-01-01 RX ADMIN — Medication 2 PUFF: at 15:27

## 2019-01-01 RX ADMIN — ENOXAPARIN SODIUM 40 MG: 40 INJECTION SUBCUTANEOUS at 09:03

## 2019-01-01 RX ADMIN — Medication 2 PUFF: at 15:30

## 2019-01-01 RX ADMIN — METHYLPREDNISOLONE SODIUM SUCCINATE 60 MG: 125 INJECTION, POWDER, FOR SOLUTION INTRAMUSCULAR; INTRAVENOUS at 05:53

## 2019-01-01 RX ADMIN — FORMOTEROL FUMARATE DIHYDRATE 20 MCG: 20 SOLUTION RESPIRATORY (INHALATION) at 10:40

## 2019-01-01 RX ADMIN — METHYLPREDNISOLONE SODIUM SUCCINATE 125 MG: 125 INJECTION, POWDER, FOR SOLUTION INTRAMUSCULAR; INTRAVENOUS at 05:18

## 2019-01-01 RX ADMIN — ATORVASTATIN CALCIUM 40 MG: 40 TABLET, FILM COATED ORAL at 07:46

## 2019-01-01 RX ADMIN — SODIUM CHLORIDE 500 ML: 9 INJECTION, SOLUTION INTRAVENOUS at 00:30

## 2019-01-01 RX ADMIN — OXYCODONE HYDROCHLORIDE 15 MG: 15 TABLET, FILM COATED, EXTENDED RELEASE ORAL at 09:19

## 2019-01-01 RX ADMIN — DOXYCYCLINE HYCLATE 100 MG: 100 TABLET, COATED ORAL at 08:39

## 2019-01-01 RX ADMIN — ATORVASTATIN CALCIUM 40 MG: 40 TABLET, FILM COATED ORAL at 09:20

## 2019-01-01 RX ADMIN — LORAZEPAM 0.5 MG: 2 INJECTION, SOLUTION INTRAMUSCULAR; INTRAVENOUS at 10:39

## 2019-01-01 RX ADMIN — MORPHINE SULFATE 4 MG: 4 INJECTION INTRAVENOUS at 08:22

## 2019-01-01 RX ADMIN — DILTIAZEM HYDROCHLORIDE 120 MG: 120 CAPSULE, COATED, EXTENDED RELEASE ORAL at 09:13

## 2019-01-01 RX ADMIN — Medication 10 ML: at 08:41

## 2019-01-01 RX ADMIN — FLUTICASONE PROPIONATE 2 SPRAY: 50 SPRAY, METERED NASAL at 08:54

## 2019-01-01 RX ADMIN — ATORVASTATIN CALCIUM 40 MG: 40 TABLET, FILM COATED ORAL at 08:39

## 2019-01-01 RX ADMIN — OXYCODONE HYDROCHLORIDE 10 MG: 10 TABLET, FILM COATED, EXTENDED RELEASE ORAL at 11:11

## 2019-01-01 RX ADMIN — METHYLPREDNISOLONE SODIUM SUCCINATE 40 MG: 40 INJECTION, POWDER, FOR SOLUTION INTRAMUSCULAR; INTRAVENOUS at 00:09

## 2019-01-01 RX ADMIN — PREDNISONE 40 MG: 10 TABLET ORAL at 08:04

## 2019-01-01 RX ADMIN — OXYCODONE HYDROCHLORIDE AND ACETAMINOPHEN 1 TABLET: 10; 325 TABLET ORAL at 13:49

## 2019-01-01 RX ADMIN — ASPIRIN 81 MG 81 MG: 81 TABLET ORAL at 08:40

## 2019-01-01 RX ADMIN — Medication 2 PUFF: at 07:00

## 2019-01-01 RX ADMIN — Medication 10 ML: at 11:20

## 2019-01-01 RX ADMIN — METHYLPREDNISOLONE SODIUM SUCCINATE 40 MG: 40 INJECTION, POWDER, FOR SOLUTION INTRAMUSCULAR; INTRAVENOUS at 12:17

## 2019-01-01 RX ADMIN — METHYLPREDNISOLONE SODIUM SUCCINATE 40 MG: 40 INJECTION, POWDER, FOR SOLUTION INTRAMUSCULAR; INTRAVENOUS at 23:45

## 2019-01-01 RX ADMIN — DULOXETINE HYDROCHLORIDE 60 MG: 60 CAPSULE, DELAYED RELEASE ORAL at 08:31

## 2019-01-01 RX ADMIN — DULOXETINE HYDROCHLORIDE 60 MG: 60 CAPSULE, DELAYED RELEASE ORAL at 08:39

## 2019-01-01 RX ADMIN — ENOXAPARIN SODIUM 40 MG: 40 INJECTION SUBCUTANEOUS at 08:48

## 2019-01-01 RX ADMIN — ENOXAPARIN SODIUM 40 MG: 40 INJECTION SUBCUTANEOUS at 08:32

## 2019-01-01 RX ADMIN — METHYLPREDNISOLONE SODIUM SUCCINATE 125 MG: 125 INJECTION, POWDER, FOR SOLUTION INTRAMUSCULAR; INTRAVENOUS at 21:00

## 2019-01-01 RX ADMIN — SODIUM CHLORIDE: 9 INJECTION, SOLUTION INTRAVENOUS at 22:45

## 2019-01-01 RX ADMIN — PREDNISONE 40 MG: 10 TABLET ORAL at 09:36

## 2019-01-01 RX ADMIN — OXYCODONE HYDROCHLORIDE AND ACETAMINOPHEN 1 TABLET: 10; 325 TABLET ORAL at 23:18

## 2019-01-01 RX ADMIN — IPRATROPIUM BROMIDE AND ALBUTEROL SULFATE 1 AMPULE: .5; 3 SOLUTION RESPIRATORY (INHALATION) at 07:45

## 2019-01-01 RX ADMIN — OXYCODONE HYDROCHLORIDE 15 MG: 15 TABLET, FILM COATED, EXTENDED RELEASE ORAL at 09:04

## 2019-01-01 RX ADMIN — MAGNESIUM SULFATE HEPTAHYDRATE 2 G: 40 INJECTION, SOLUTION INTRAVENOUS at 21:00

## 2019-01-01 RX ADMIN — DILTIAZEM HYDROCHLORIDE 120 MG: 120 CAPSULE, COATED, EXTENDED RELEASE ORAL at 09:23

## 2019-01-01 RX ADMIN — Medication 10 ML: at 08:32

## 2019-01-01 RX ADMIN — IPRATROPIUM BROMIDE AND ALBUTEROL SULFATE 1 AMPULE: .5; 3 SOLUTION RESPIRATORY (INHALATION) at 08:27

## 2019-01-01 RX ADMIN — Medication 10 ML: at 09:37

## 2019-01-01 RX ADMIN — ASPIRIN 81 MG 81 MG: 81 TABLET ORAL at 09:14

## 2019-01-01 RX ADMIN — Medication 10 ML: at 07:47

## 2019-01-01 RX ADMIN — ENOXAPARIN SODIUM 40 MG: 40 INJECTION SUBCUTANEOUS at 08:56

## 2019-01-01 RX ADMIN — IPRATROPIUM BROMIDE AND ALBUTEROL SULFATE 1 AMPULE: .5; 3 SOLUTION RESPIRATORY (INHALATION) at 19:31

## 2019-01-01 RX ADMIN — OXYCODONE HYDROCHLORIDE AND ACETAMINOPHEN 1 TABLET: 10; 325 TABLET ORAL at 02:51

## 2019-01-01 RX ADMIN — DILTIAZEM HYDROCHLORIDE 120 MG: 120 CAPSULE, COATED, EXTENDED RELEASE ORAL at 08:39

## 2019-01-01 RX ADMIN — Medication 10 ML: at 20:47

## 2019-01-01 RX ADMIN — Medication 10 ML: at 20:05

## 2019-01-01 RX ADMIN — ATORVASTATIN CALCIUM 40 MG: 40 TABLET, FILM COATED ORAL at 08:55

## 2019-01-01 RX ADMIN — LOSARTAN POTASSIUM 100 MG: 100 TABLET, FILM COATED ORAL at 07:47

## 2019-01-01 RX ADMIN — METHYLPREDNISOLONE SODIUM SUCCINATE 40 MG: 40 INJECTION, POWDER, LYOPHILIZED, FOR SOLUTION INTRAMUSCULAR; INTRAVENOUS at 18:28

## 2019-01-01 RX ADMIN — Medication 2 PUFF: at 10:51

## 2019-01-01 RX ADMIN — BUDESONIDE 500 MCG: 0.5 SUSPENSION RESPIRATORY (INHALATION) at 08:00

## 2019-01-01 RX ADMIN — Medication 10 ML: at 20:18

## 2019-01-01 RX ADMIN — FORMOTEROL FUMARATE DIHYDRATE 20 MCG: 20 SOLUTION RESPIRATORY (INHALATION) at 08:00

## 2019-01-01 RX ADMIN — SODIUM CHLORIDE 500 ML: 9 INJECTION, SOLUTION INTRAVENOUS at 21:04

## 2019-01-01 RX ADMIN — ENOXAPARIN SODIUM 40 MG: 100 INJECTION SUBCUTANEOUS at 09:23

## 2019-01-01 RX ADMIN — OXYCODONE HYDROCHLORIDE 15 MG: 15 TABLET, FILM COATED, EXTENDED RELEASE ORAL at 08:40

## 2019-01-01 RX ADMIN — Medication 2 PUFF: at 08:30

## 2019-01-01 RX ADMIN — DULOXETINE HYDROCHLORIDE 60 MG: 60 CAPSULE, DELAYED RELEASE ORAL at 09:47

## 2019-01-01 RX ADMIN — IPRATROPIUM BROMIDE AND ALBUTEROL SULFATE 1 AMPULE: .5; 3 SOLUTION RESPIRATORY (INHALATION) at 12:06

## 2019-01-01 RX ADMIN — IPRATROPIUM BROMIDE AND ALBUTEROL SULFATE 1 AMPULE: .5; 3 SOLUTION RESPIRATORY (INHALATION) at 08:30

## 2019-01-01 RX ADMIN — Medication 2 PUFF: at 22:40

## 2019-01-01 RX ADMIN — IPRATROPIUM BROMIDE AND ALBUTEROL SULFATE 1 AMPULE: .5; 3 SOLUTION RESPIRATORY (INHALATION) at 20:24

## 2019-01-01 RX ADMIN — IPRATROPIUM BROMIDE AND ALBUTEROL SULFATE 1 AMPULE: .5; 3 SOLUTION RESPIRATORY (INHALATION) at 16:18

## 2019-01-01 RX ADMIN — DILTIAZEM HYDROCHLORIDE 120 MG: 120 CAPSULE, COATED, EXTENDED RELEASE ORAL at 09:46

## 2019-01-01 RX ADMIN — FUROSEMIDE 40 MG: 10 INJECTION, SOLUTION INTRAMUSCULAR; INTRAVENOUS at 09:37

## 2019-01-01 RX ADMIN — METHYLPREDNISOLONE SODIUM SUCCINATE 40 MG: 40 INJECTION, POWDER, FOR SOLUTION INTRAMUSCULAR; INTRAVENOUS at 06:11

## 2019-01-01 RX ADMIN — ATORVASTATIN CALCIUM 40 MG: 40 TABLET, FILM COATED ORAL at 09:35

## 2019-01-01 RX ADMIN — DULOXETINE HYDROCHLORIDE 60 MG: 60 CAPSULE, DELAYED RELEASE ORAL at 20:16

## 2019-01-01 RX ADMIN — DULOXETINE HYDROCHLORIDE 60 MG: 60 CAPSULE, DELAYED RELEASE ORAL at 09:13

## 2019-01-01 RX ADMIN — AZITHROMYCIN 250 MG: 250 TABLET, FILM COATED ORAL at 09:46

## 2019-01-01 RX ADMIN — DULOXETINE HYDROCHLORIDE 60 MG: 60 CAPSULE, DELAYED RELEASE ORAL at 15:32

## 2019-01-01 RX ADMIN — OXYCODONE HYDROCHLORIDE AND ACETAMINOPHEN 1 TABLET: 10; 325 TABLET ORAL at 09:39

## 2019-01-01 RX ADMIN — METHYLPREDNISOLONE SODIUM SUCCINATE 40 MG: 40 INJECTION, POWDER, FOR SOLUTION INTRAMUSCULAR; INTRAVENOUS at 12:11

## 2019-01-01 RX ADMIN — OXYCODONE HYDROCHLORIDE 10 MG: 10 TABLET, FILM COATED, EXTENDED RELEASE ORAL at 22:12

## 2019-01-01 RX ADMIN — BUSPIRONE HYDROCHLORIDE 20 MG: 5 TABLET ORAL at 09:19

## 2019-01-01 RX ADMIN — LEVOFLOXACIN 750 MG: 5 INJECTION, SOLUTION INTRAVENOUS at 06:08

## 2019-01-01 RX ADMIN — FUROSEMIDE 40 MG: 10 INJECTION, SOLUTION INTRAMUSCULAR; INTRAVENOUS at 08:04

## 2019-01-01 RX ADMIN — Medication 10 ML: at 09:04

## 2019-01-01 RX ADMIN — OXYCODONE HYDROCHLORIDE AND ACETAMINOPHEN 1 TABLET: 10; 325 TABLET ORAL at 17:16

## 2019-01-01 RX ADMIN — METHYLPREDNISOLONE SODIUM SUCCINATE 40 MG: 40 INJECTION, POWDER, LYOPHILIZED, FOR SOLUTION INTRAMUSCULAR; INTRAVENOUS at 17:53

## 2019-01-01 RX ADMIN — Medication 2 PUFF: at 11:18

## 2019-01-01 RX ADMIN — BUSPIRONE HYDROCHLORIDE 20 MG: 5 TABLET ORAL at 20:44

## 2019-01-01 RX ADMIN — PREDNISONE 60 MG: 20 TABLET ORAL at 00:28

## 2019-01-01 RX ADMIN — IPRATROPIUM BROMIDE AND ALBUTEROL SULFATE 1 AMPULE: .5; 3 SOLUTION RESPIRATORY (INHALATION) at 22:35

## 2019-01-01 RX ADMIN — IPRATROPIUM BROMIDE AND ALBUTEROL SULFATE 1 AMPULE: .5; 3 SOLUTION RESPIRATORY (INHALATION) at 10:56

## 2019-01-01 RX ADMIN — IPRATROPIUM BROMIDE AND ALBUTEROL SULFATE 1 AMPULE: .5; 3 SOLUTION RESPIRATORY (INHALATION) at 08:50

## 2019-01-01 RX ADMIN — HYDROCODONE BITARTRATE AND ACETAMINOPHEN 1 TABLET: 5; 325 TABLET ORAL at 13:18

## 2019-01-01 RX ADMIN — OXYCODONE HYDROCHLORIDE 15 MG: 15 TABLET, FILM COATED, EXTENDED RELEASE ORAL at 21:39

## 2019-01-01 RX ADMIN — BUDESONIDE 500 MCG: 0.5 SUSPENSION RESPIRATORY (INHALATION) at 08:50

## 2019-01-01 RX ADMIN — DILTIAZEM HYDROCHLORIDE 120 MG: 120 CAPSULE, COATED, EXTENDED RELEASE ORAL at 08:40

## 2019-01-01 RX ADMIN — SALINE NASAL SPRAY 1 SPRAY: 1.5 SOLUTION NASAL at 10:21

## 2019-01-01 RX ADMIN — Medication 2 PUFF: at 15:11

## 2019-01-01 RX ADMIN — OXYCODONE HYDROCHLORIDE 15 MG: 15 TABLET, FILM COATED, EXTENDED RELEASE ORAL at 20:44

## 2019-01-01 RX ADMIN — METHYLPREDNISOLONE SODIUM SUCCINATE 40 MG: 40 INJECTION, POWDER, LYOPHILIZED, FOR SOLUTION INTRAMUSCULAR; INTRAVENOUS at 05:52

## 2019-01-01 RX ADMIN — IPRATROPIUM BROMIDE AND ALBUTEROL SULFATE 1 AMPULE: .5; 3 SOLUTION RESPIRATORY (INHALATION) at 22:40

## 2019-01-01 RX ADMIN — OXYCODONE HYDROCHLORIDE 10 MG: 10 TABLET, FILM COATED, EXTENDED RELEASE ORAL at 09:36

## 2019-01-01 RX ADMIN — IPRATROPIUM BROMIDE AND ALBUTEROL 1 PUFF: 20; 100 SPRAY, METERED RESPIRATORY (INHALATION) at 19:15

## 2019-01-01 RX ADMIN — Medication 2 PUFF: at 20:25

## 2019-01-01 RX ADMIN — METHYLPREDNISOLONE SODIUM SUCCINATE 40 MG: 40 INJECTION, POWDER, FOR SOLUTION INTRAMUSCULAR; INTRAVENOUS at 05:53

## 2019-01-01 RX ADMIN — DULOXETINE HYDROCHLORIDE 60 MG: 60 CAPSULE, DELAYED RELEASE ORAL at 08:40

## 2019-01-01 RX ADMIN — Medication 10 ML: at 22:17

## 2019-01-01 RX ADMIN — Medication 10 ML: at 22:09

## 2019-01-01 RX ADMIN — ENOXAPARIN SODIUM 40 MG: 100 INJECTION SUBCUTANEOUS at 08:04

## 2019-01-01 RX ADMIN — OXYCODONE HYDROCHLORIDE AND ACETAMINOPHEN 1 TABLET: 10; 325 TABLET ORAL at 01:40

## 2019-01-01 RX ADMIN — OXYCODONE HYDROCHLORIDE 15 MG: 15 TABLET, FILM COATED, EXTENDED RELEASE ORAL at 10:45

## 2019-01-01 RX ADMIN — SALINE NASAL SPRAY 1 SPRAY: 1.5 SOLUTION NASAL at 07:46

## 2019-01-01 RX ADMIN — OXYCODONE HYDROCHLORIDE 10 MG: 10 TABLET, FILM COATED, EXTENDED RELEASE ORAL at 11:21

## 2019-01-01 RX ADMIN — DULOXETINE HYDROCHLORIDE 60 MG: 60 CAPSULE, DELAYED RELEASE ORAL at 21:39

## 2019-01-01 RX ADMIN — ATORVASTATIN CALCIUM 40 MG: 40 TABLET, FILM COATED ORAL at 08:31

## 2019-01-01 RX ADMIN — METHYLPREDNISOLONE SODIUM SUCCINATE 40 MG: 40 INJECTION, POWDER, FOR SOLUTION INTRAMUSCULAR; INTRAVENOUS at 17:37

## 2019-01-01 RX ADMIN — OXYCODONE HYDROCHLORIDE 10 MG: 10 TABLET, FILM COATED, EXTENDED RELEASE ORAL at 10:15

## 2019-01-01 RX ADMIN — Medication 10 ML: at 09:40

## 2019-01-01 RX ADMIN — ASPIRIN 81 MG 81 MG: 81 TABLET ORAL at 08:48

## 2019-01-01 RX ADMIN — AZITHROMYCIN 250 MG: 250 TABLET, FILM COATED ORAL at 09:03

## 2019-01-01 RX ADMIN — DILTIAZEM HYDROCHLORIDE 120 MG: 120 CAPSULE, COATED, EXTENDED RELEASE ORAL at 09:35

## 2019-01-01 RX ADMIN — MORPHINE SULFATE 4 MG: 4 INJECTION, SOLUTION INTRAMUSCULAR; INTRAVENOUS at 08:22

## 2019-01-01 RX ADMIN — OXYCODONE HYDROCHLORIDE AND ACETAMINOPHEN 1 TABLET: 10; 325 TABLET ORAL at 11:06

## 2019-01-01 RX ADMIN — HYDROCODONE BITARTRATE AND ACETAMINOPHEN 1 TABLET: 5; 325 TABLET ORAL at 20:35

## 2019-01-01 RX ADMIN — Medication 10 ML: at 20:34

## 2019-01-01 RX ADMIN — ENOXAPARIN SODIUM 40 MG: 40 INJECTION SUBCUTANEOUS at 09:47

## 2019-01-01 RX ADMIN — OXYCODONE HYDROCHLORIDE AND ACETAMINOPHEN 1 TABLET: 10; 325 TABLET ORAL at 19:24

## 2019-01-01 RX ADMIN — ASPIRIN 81 MG 81 MG: 81 TABLET ORAL at 09:47

## 2019-01-01 RX ADMIN — ATORVASTATIN CALCIUM 40 MG: 40 TABLET, FILM COATED ORAL at 08:04

## 2019-01-01 RX ADMIN — IPRATROPIUM BROMIDE AND ALBUTEROL SULFATE 1 AMPULE: .5; 3 SOLUTION RESPIRATORY (INHALATION) at 12:08

## 2019-01-01 RX ADMIN — METHYLPREDNISOLONE SODIUM SUCCINATE 125 MG: 125 INJECTION, POWDER, FOR SOLUTION INTRAMUSCULAR; INTRAVENOUS at 16:14

## 2019-01-01 RX ADMIN — DULOXETINE HYDROCHLORIDE 60 MG: 60 CAPSULE, DELAYED RELEASE ORAL at 09:40

## 2019-01-01 RX ADMIN — IPRATROPIUM BROMIDE AND ALBUTEROL SULFATE 1 AMPULE: .5; 3 SOLUTION RESPIRATORY (INHALATION) at 16:20

## 2019-01-01 RX ADMIN — IPRATROPIUM BROMIDE AND ALBUTEROL SULFATE 1 AMPULE: .5; 3 SOLUTION RESPIRATORY (INHALATION) at 16:01

## 2019-01-01 RX ADMIN — SENNOSIDES, DOCUSATE SODIUM 2 TABLET: 50; 8.6 TABLET, FILM COATED ORAL at 09:54

## 2019-01-01 RX ADMIN — OXYCODONE HYDROCHLORIDE AND ACETAMINOPHEN 1 TABLET: 10; 325 TABLET ORAL at 06:31

## 2019-01-01 RX ADMIN — BUSPIRONE HYDROCHLORIDE 20 MG: 5 TABLET ORAL at 16:16

## 2019-01-01 RX ADMIN — DULOXETINE HYDROCHLORIDE 60 MG: 60 CAPSULE, DELAYED RELEASE ORAL at 09:36

## 2019-01-01 RX ADMIN — ATORVASTATIN CALCIUM 40 MG: 40 TABLET, FILM COATED ORAL at 09:05

## 2019-01-01 RX ADMIN — Medication 10 ML: at 21:39

## 2019-01-01 RX ADMIN — OXYCODONE HYDROCHLORIDE AND ACETAMINOPHEN 1 TABLET: 10; 325 TABLET ORAL at 18:28

## 2019-01-01 ASSESSMENT — PAIN SCALES - GENERAL
PAINLEVEL_OUTOF10: 8
PAINLEVEL_OUTOF10: 3
PAINLEVEL_OUTOF10: 7
PAINLEVEL_OUTOF10: 4
PAINLEVEL_OUTOF10: 7
PAINLEVEL_OUTOF10: 8
PAINLEVEL_OUTOF10: 7
PAINLEVEL_OUTOF10: 7
PAINLEVEL_OUTOF10: 5
PAINLEVEL_OUTOF10: 6
PAINLEVEL_OUTOF10: 8
PAINLEVEL_OUTOF10: 7
PAINLEVEL_OUTOF10: 5
PAINLEVEL_OUTOF10: 7
PAINLEVEL_OUTOF10: 5
PAINLEVEL_OUTOF10: 7
PAINLEVEL_OUTOF10: 9
PAINLEVEL_OUTOF10: 7
PAINLEVEL_OUTOF10: 8
PAINLEVEL_OUTOF10: 2
PAINLEVEL_OUTOF10: 4
PAINLEVEL_OUTOF10: 5
PAINLEVEL_OUTOF10: 5
PAINLEVEL_OUTOF10: 8
PAINLEVEL_OUTOF10: 6
PAINLEVEL_OUTOF10: 6
PAINLEVEL_OUTOF10: 7
PAINLEVEL_OUTOF10: 5
PAINLEVEL_OUTOF10: 7
PAINLEVEL_OUTOF10: 2
PAINLEVEL_OUTOF10: 5
PAINLEVEL_OUTOF10: 7
PAINLEVEL_OUTOF10: 8
PAINLEVEL_OUTOF10: 4
PAINLEVEL_OUTOF10: 5
PAINLEVEL_OUTOF10: 6
PAINLEVEL_OUTOF10: 9
PAINLEVEL_OUTOF10: 7
PAINLEVEL_OUTOF10: 8
PAINLEVEL_OUTOF10: 5
PAINLEVEL_OUTOF10: 7
PAINLEVEL_OUTOF10: 7
PAINLEVEL_OUTOF10: 5
PAINLEVEL_OUTOF10: 7
PAINLEVEL_OUTOF10: 6
PAINLEVEL_OUTOF10: 8
PAINLEVEL_OUTOF10: 6
PAINLEVEL_OUTOF10: 5
PAINLEVEL_OUTOF10: 0
PAINLEVEL_OUTOF10: 7
PAINLEVEL_OUTOF10: 7
PAINLEVEL_OUTOF10: 6
PAINLEVEL_OUTOF10: 0
PAINLEVEL_OUTOF10: 5
PAINLEVEL_OUTOF10: 5
PAINLEVEL_OUTOF10: 7
PAINLEVEL_OUTOF10: 4
PAINLEVEL_OUTOF10: 4
PAINLEVEL_OUTOF10: 5
PAINLEVEL_OUTOF10: 8
PAINLEVEL_OUTOF10: 7
PAINLEVEL_OUTOF10: 6
PAINLEVEL_OUTOF10: 7
PAINLEVEL_OUTOF10: 8
PAINLEVEL_OUTOF10: 8
PAINLEVEL_OUTOF10: 7
PAINLEVEL_OUTOF10: 9
PAINLEVEL_OUTOF10: 0
PAINLEVEL_OUTOF10: 3
PAINLEVEL_OUTOF10: 7
PAINLEVEL_OUTOF10: 6
PAINLEVEL_OUTOF10: 6
PAINLEVEL_OUTOF10: 9
PAINLEVEL_OUTOF10: 7
PAINLEVEL_OUTOF10: 0
PAINLEVEL_OUTOF10: 8
PAINLEVEL_OUTOF10: 3
PAINLEVEL_OUTOF10: 0

## 2019-01-01 ASSESSMENT — PAIN DESCRIPTION - FREQUENCY
FREQUENCY: CONTINUOUS
FREQUENCY: INTERMITTENT
FREQUENCY: CONTINUOUS
FREQUENCY: CONTINUOUS
FREQUENCY: INTERMITTENT
FREQUENCY: CONTINUOUS
FREQUENCY: CONTINUOUS
FREQUENCY: INTERMITTENT
FREQUENCY: CONTINUOUS
FREQUENCY: INTERMITTENT
FREQUENCY: CONTINUOUS

## 2019-01-01 ASSESSMENT — PAIN DESCRIPTION - ORIENTATION
ORIENTATION: LOWER
ORIENTATION: MID;LOWER
ORIENTATION: LOWER
ORIENTATION: LOWER
ORIENTATION: MID
ORIENTATION: LOWER
ORIENTATION: LOWER
ORIENTATION: MID
ORIENTATION: LOWER
ORIENTATION: MID;LOWER
ORIENTATION: LOWER
ORIENTATION: LOWER;MID
ORIENTATION: LOWER
ORIENTATION: MID
ORIENTATION: LOWER
ORIENTATION: MID;LOWER
ORIENTATION: LOWER

## 2019-01-01 ASSESSMENT — PAIN DESCRIPTION - PROGRESSION
CLINICAL_PROGRESSION: NOT CHANGED
CLINICAL_PROGRESSION: GRADUALLY WORSENING
CLINICAL_PROGRESSION: NOT CHANGED
CLINICAL_PROGRESSION: NOT CHANGED
CLINICAL_PROGRESSION: GRADUALLY WORSENING
CLINICAL_PROGRESSION: GRADUALLY WORSENING
CLINICAL_PROGRESSION: NOT CHANGED
CLINICAL_PROGRESSION: NOT CHANGED
CLINICAL_PROGRESSION: GRADUALLY WORSENING
CLINICAL_PROGRESSION: GRADUALLY WORSENING
CLINICAL_PROGRESSION: NOT CHANGED

## 2019-01-01 ASSESSMENT — PAIN DESCRIPTION - PAIN TYPE
TYPE: CHRONIC PAIN

## 2019-01-01 ASSESSMENT — PAIN DESCRIPTION - DESCRIPTORS
DESCRIPTORS: ACHING
DESCRIPTORS: ACHING;CONSTANT
DESCRIPTORS: ACHING;CONSTANT;DISCOMFORT
DESCRIPTORS: ACHING
DESCRIPTORS: ACHING;CONSTANT;DISCOMFORT
DESCRIPTORS: ACHING
DESCRIPTORS: DISCOMFORT
DESCRIPTORS: CONSTANT;ACHING
DESCRIPTORS: ACHING
DESCRIPTORS: ACHING
DESCRIPTORS: CONSTANT
DESCRIPTORS: ACHING

## 2019-01-01 ASSESSMENT — PAIN DESCRIPTION - LOCATION
LOCATION: BACK
LOCATION: CHEST
LOCATION: BACK

## 2019-01-01 ASSESSMENT — PAIN DESCRIPTION - ONSET
ONSET: ON-GOING
ONSET: GRADUAL
ONSET: ON-GOING
ONSET: GRADUAL
ONSET: ON-GOING
ONSET: GRADUAL
ONSET: ON-GOING

## 2019-01-01 ASSESSMENT — ENCOUNTER SYMPTOMS
COUGH: 1
SHORTNESS OF BREATH: 1
NAUSEA: 1
COUGH: 0
BACK PAIN: 1
COLOR CHANGE: 0
CHEST TIGHTNESS: 0
CHEST TIGHTNESS: 1
RHINORRHEA: 0
SPUTUM PRODUCTION: 1
GASTROINTESTINAL NEGATIVE: 1
NAUSEA: 0
BACK PAIN: 0
EYES NEGATIVE: 1
SHORTNESS OF BREATH: 1
SORE THROAT: 0
WHEEZING: 1
ABDOMINAL PAIN: 0
COUGH: 1
WHEEZING: 0
PHOTOPHOBIA: 0
VOMITING: 0
SHORTNESS OF BREATH: 1
VOMITING: 0

## 2019-01-01 ASSESSMENT — PAIN - FUNCTIONAL ASSESSMENT
PAIN_FUNCTIONAL_ASSESSMENT: ACTIVITIES ARE NOT PREVENTED
PAIN_FUNCTIONAL_ASSESSMENT: PREVENTS OR INTERFERES SOME ACTIVE ACTIVITIES AND ADLS
PAIN_FUNCTIONAL_ASSESSMENT: ACTIVITIES ARE NOT PREVENTED
PAIN_FUNCTIONAL_ASSESSMENT: PREVENTS OR INTERFERES SOME ACTIVE ACTIVITIES AND ADLS
PAIN_FUNCTIONAL_ASSESSMENT: ACTIVITIES ARE NOT PREVENTED
PAIN_FUNCTIONAL_ASSESSMENT: PREVENTS OR INTERFERES SOME ACTIVE ACTIVITIES AND ADLS
PAIN_FUNCTIONAL_ASSESSMENT: PREVENTS OR INTERFERES SOME ACTIVE ACTIVITIES AND ADLS
PAIN_FUNCTIONAL_ASSESSMENT: ACTIVITIES ARE NOT PREVENTED
PAIN_FUNCTIONAL_ASSESSMENT: PREVENTS OR INTERFERES SOME ACTIVE ACTIVITIES AND ADLS
PAIN_FUNCTIONAL_ASSESSMENT: PREVENTS OR INTERFERES SOME ACTIVE ACTIVITIES AND ADLS

## 2019-01-01 ASSESSMENT — PAIN SCALES - WONG BAKER: WONGBAKER_NUMERICALRESPONSE: 2

## 2019-02-04 PROBLEM — Z72.0 TOBACCO ABUSE: Status: ACTIVE | Noted: 2019-01-01

## 2019-02-27 PROBLEM — I10 BENIGN ESSENTIAL HYPERTENSION: Status: ACTIVE | Noted: 2019-01-01

## 2019-04-09 NOTE — PATIENT INSTRUCTIONS
Try a fan at home to help your breathlessness. You can decrease the Cymbalta to once a day for one week and then one every other day for a week and after another week could try to stop it. You can stop the Buspar if you wish. Patient should call the office immediately with new or ongoing signs or symptoms or worsening, or proceed to the emergency room. If you are on medications which could impair your senses, you are at risk of weakness, falls, dizziness, or drowsiness. You should be careful during activities which could place you at risk of harm, such as climbing, using stairs, operating machinery, or driving vehicles. If you feel you cannot safely do these activities, you should request others to help you, or avoid the activities altogether. If you are drowsy for any other reason, you should use the same precautions as listed above.

## 2019-04-09 NOTE — PROGRESS NOTES
Chief Complaint   Patient presents with    Anxiety    COPD    Other     patient has multiple medical complaints   He states he has stopped the Wellbutrin because it didn't seem to be helping. He thinks we should stop more of the medications. Discussed his lung disease is the cause of his panic, suggested a fan on him in the house may help some of the air hunger. He requests a medication to take the anxiety away. Discussed if he was in Hospice there would be controlled substances that could be used to help him. Complains of the 81 mg aspirin and the bruising. HPI:  Christina Choi is a 75 y.o. (: 1945) here today for    Mood Disorder:  Patient presents for follow-up of depression and anxiety disorder. Current complaints include: depressed mood and panic attacks: when he feels too SOB. He denies tearfulness. Symptoms/signs of miya: none. External stressors: illness or family illness. Current treatment includes: Cymbalta- 60 mg bid, buspar 10 mg tid prn; wellbutrin  mg bid. Medication side effects: none. Patient's medications, allergies, past medical, surgical, social and family histories were reviewed and updated asappropriate on 2019 at 3:26 PM.    ROS:  Review of Systems    All other systems reviewed and are negative except as noted above on 2019 at 3:26 PM. Additional review of systems may be scanned into the media section ofthis medical record. Any responses requiring further intervention were pursued.     Hemoglobin A1C (%)   Date Value   2018 5.7     Microscopic Examination (no units)   Date Value   2019 Not Indicated     LDL Calculated (mg/dL)   Date Value   2018 77     Past Medical History:   Diagnosis Date    Amputation finger     Ankle fracture, left     Chronic nasal congestion     Chronic pain syndrome     Compression fracture of lumbar vertebra (HCC)     COPD (chronic obstructive pulmonary disease) (HCC)     DDD (degenerative disc disease)     Depression     Facet joint disease of lumbosacral region     Hyperglycemia     Hyperlipidemia     Insomnia     Radiculopathy     Smoker's respiratory syndrome         Family History   Problem Relation Age of Onset    Cancer Mother     Diabetes Father     Heart Disease Sister      Social History     Socioeconomic History    Marital status:      Spouse name: Not on file    Number of children: Not on file    Years of education: Not on file    Highest education level: Not on file   Occupational History    Not on file   Social Needs    Financial resource strain: Not on file    Food insecurity:     Worry: Not on file     Inability: Not on file    Transportation needs:     Medical: Not on file     Non-medical: Not on file   Tobacco Use    Smoking status: Current Some Day Smoker     Packs/day: 2.00     Years: 25.00     Pack years: 50.00     Types: Cigarettes    Smokeless tobacco: Never Used   Substance and Sexual Activity    Alcohol use: No     Alcohol/week: 0.0 oz     Comment: occasional    Drug use: No    Sexual activity: Not Currently   Lifestyle    Physical activity:     Days per week: Not on file     Minutes per session: Not on file    Stress: Not on file   Relationships    Social connections:     Talks on phone: Not on file     Gets together: Not on file     Attends Restorationism service: Not on file     Active member of club or organization: Not on file     Attends meetings of clubs or organizations: Not on file     Relationship status: Not on file    Intimate partner violence:     Fear of current or ex partner: Not on file     Emotionally abused: Not on file     Physically abused: Not on file     Forced sexual activity: Not on file   Other Topics Concern    Not on file   Social History Narrative    Not on file       Prior to Visit Medications    Medication Sig Taking?  Authorizing Provider   buPROPion [facility] SR) 150 MG extended release tablet 1 tab every other day Yes Kaitlin Phillips MD   fluticasone [facility]) 50 MCG/ACT nasal spray USE 2 SPRAYS IN EACH NOSTRIL DAILY Yes Kaitlin Phillips MD   busPIRone (BUSPAR) 10 MG tablet Take 1 tablet by mouth 3 times daily Yes SAKSHI Bethea CNP   aspirin 81 MG tablet Take 81 mg by mouth daily Yes Historical Provider, MD   diltiazem (TIAZAC) 120 MG extended release capsule Take 120 mg by mouth daily Yes Historical Provider, MD   furosemide (LASIX) 20 MG tablet 1 tab daily as needed for swelling Yes Kaitlin Phillips MD   losartan (COZAAR) 100 MG tablet Take 1 tablet by mouth daily Yes SAKSHI Bethea CNP   PROAIR  (90 Base) MCG/ACT inhaler INHALE 2 PUFFS BY MOUTH 4 TIMES DAILY WHEN AWAY FROM HOME Yes SAKSHI Bethea CNP   atorvastatin (LIPITOR) 40 MG tablet TAKE 1 TABLET BY MOUTH DAILY Yes Kaitlin Phillips MD   DULoxetine (CYMBALTA) 60 MG extended release capsule Take 1 capsule by mouth 2 times daily Yes Kaitlin Phillips MD   vitamin D (CHOLECALCIFEROL) 11334 UNIT CAPS Take 1 capsule by mouth once a week Yes Kaitlin Phillips MD   aclidinium (TUDORZA PRESSAIR) 400 MCG/ACT AEPB inhaler Inhale 1 puff into the lungs 2 times daily Yes Kaitlin Phillips MD   SYMBICORT 160-4.5 MCG/ACT AERO INHALE 2 PUFFS INTO THE LUNGS 2 TIMES DAILY Yes Kaitlin Phillips MD   oxyCODONE-acetaminophen (PERCOCET)  MG per tablet Take by mouth . Yes Historical Provider, MD   OXYCONTIN 15 MG T12A extended release tablet  Yes Historical Provider, MD   ipratropium-albuterol (DUONEB) 0.5-2.5 (3) MG/3ML SOLN nebulizer solution Use qid Yes Kaitlin Phillips MD   Respiratory Therapy Supplies (NEBULIZER COMPRESSOR) KIT 1 kit by Does not apply route once for 1 dose  Kaitlin Phillips MD     No Known Allergies    OBJECTIVE:  Estimated body mass index is 28.25 kg/m² as calculated from the following:    Height as of 3/19/19: 5' 4\" (1.626 m). Weight as of this encounter: 164 lb 9.6 oz (74.7 kg). Vitals:    04/09/19 1523   BP: (!) 162/78   Site: Left Upper Arm   Position: Sitting   Cuff Size: Medium Adult   Pulse: 107   SpO2: 98%   Weight: 164 lb 9.6 oz (74.7 kg)     BP Readings from Last 2 Encounters:   04/09/19 (!) 162/78   03/19/19 104/72     Wt Readings from Last 3 Encounters:   04/09/19 164 lb 9.6 oz (74.7 kg)   03/19/19 164 lb (74.4 kg)   02/26/19 162 lb 6.4 oz (73.7 kg)       Physical Exam   Constitutional: He appears well-developed and well-nourished. No distress. HENT:   Head: Normocephalic and atraumatic. Right Ear: External ear normal.   Left Ear: External ear normal.   Nose: Nose normal.   Lips symmetric   Eyes: Pupils are equal, round, and reactive to light. Lids are normal. Right eye exhibits no discharge. Left eye exhibits no discharge. No scleral icterus. Neck: No JVD present. No thyromegaly present. Cardiovascular: Normal rate, regular rhythm and normal heart sounds. Pulmonary/Chest: Effort normal. No accessory muscle usage. No respiratory distress. He has decreased breath sounds in the right upper field, the right middle field, the right lower field, the left upper field, the left middle field and the left lower field. Diminished breath sounds   Abdominal: Soft. There is no hepatosplenomegaly. There is no tenderness. Musculoskeletal: He exhibits no edema. Skin: Skin is warm and dry. No rash noted. He is not diaphoretic. No erythema. No pallor. Turgor normal   Psychiatric: He has a normal mood and affect. His behavior is normal. Judgment and thought content normal.   Nursing note and vitals reviewed. ASSESSMENT PLAN      Diagnosis Orders   1. Mucopurulent chronic bronchitis ([address]     2. Anxiety     3. Benign essential hypertension     4. Coronary artery disease due to lipid rich plaque     5. Reactive depression     6. Tobacco abuse     Patient's COPD is severe end-stage. I discussed all of this.   He asked about stronger medicine to help with the anxiety. I told him about opiates which she actually is already on for pain management and benzodiazepines. However, I told him we would not use these medicines outside of hospice setting. He is not ready for hospice I suggested having fans to blow on him. We ended up discussing opiates were he thought he was addicted, I suspect is more dependent and we talked about how one goes through withdrawal doesn't mean that they are addicted. His blood pressure was acceptable no symptoms of coronary insufficiency. Wellbutrin he is off of Cymbalta and BuSpar did not really seem be helping him and I talked about how to discontinue BuSpar and taper off of Cymbalta. He continues to smoke at this point, and he knows he should not. Follow-up in 3 months. Patient should call the office immediately with new or ongoing signs or symptoms or worsening, or proceed to the emergency room. No changes in past medical history, past surgical history, social history, or family history were noted during the patient encounter unless specifically listed above. All updates of past medical history, past surgical history, social history, or family history were reviewed personally by me during the office visit. All problems listed in the assessment are stable unless noted otherwise. Medication profile reviewed personally by me during the office visit. Medication side effects and possible impairments from medications were discussed as applicable. This document was prepared by a combination of typing and transcription through a voice recognition software.        Scribe attestation: Christopher Bustillos RN, am scribing for and in the presence of Zenon Alvarado MD. Electronically signed by Gaby Snow RN on 4/9/2019 at 3:26 PM      Provider attestation:     I, Dr. Shiela Dillon, personally performed the services described in this documentation, as scribed by the above signed scribe in my presence, and it is both accurate and complete. I agree with the ROS and Past Histories independently gathered by the clinical support staff and the remaining scribed note accurately describes my personal service to the patient.       4/9/2019    4:13 PM

## 2019-04-18 NOTE — TELEPHONE ENCOUNTER
Talked with pt he had the mucinex and flonase already informed pt I was sending in the tessalon pearls for him   Pt declined getting the chest xray done said he just can't get down there to get it done   Informed pt if he gets worse to go to the ER or Urgent Care

## 2019-04-18 NOTE — TELEPHONE ENCOUNTER
Pt has coughing and congestion. He is wanting to know if medication can be called into his pharmacy?     [id number] [place] [address]

## 2019-04-19 NOTE — ED PROVIDER NOTES
05/02/2016    colonic polyps    HERNIA REPAIR       Family History   Problem Relation Age of Onset    Cancer Mother     Diabetes Father     Heart Disease Sister      Social History     Socioeconomic History    Marital status:       Spouse name: Not on file    Number of children: Not on file    Years of education: Not on file    Highest education level: Not on file   Occupational History    Not on file   Social Needs    Financial resource strain: Not on file    Food insecurity:     Worry: Not on file     Inability: Not on file    Transportation needs:     Medical: Not on file     Non-medical: Not on file   Tobacco Use    Smoking status: Current Some Day Smoker     Packs/day: 2.00     Years: 25.00     Pack years: 50.00     Types: Cigarettes    Smokeless tobacco: Never Used   Substance and Sexual Activity    Alcohol use: No     Alcohol/week: 0.0 oz     Comment: occasional    Drug use: No    Sexual activity: Not Currently   Lifestyle    Physical activity:     Days per week: Not on file     Minutes per session: Not on file    Stress: Not on file   Relationships    Social connections:     Talks on phone: Not on file     Gets together: Not on file     Attends Quaker service: Not on file     Active member of club or organization: Not on file     Attends meetings of clubs or organizations: Not on file     Relationship status: Not on file    Intimate partner violence:     Fear of current or ex partner: Not on file     Emotionally abused: Not on file     Physically abused: Not on file     Forced sexual activity: Not on file   Other Topics Concern    Not on file   Social History Narrative    Not on file     Current Facility-Administered Medications   Medication Dose Route Frequency Provider Last Rate Last Dose    ipratropium-albuterol (DUONEB) nebulizer solution 1 ampule  1 ampule Inhalation Once Steffany Church MD        methylPREDNISolone sodium (SOLU-MEDROL) injection 40 mg  40 mg Intravenous Once Travon Long MD         Current Outpatient Medications   Medication Sig Dispense Refill    benzonatate (TESSALON) 100 MG capsule Take 1 capsule by mouth 2 times daily as needed for Cough 20 capsule 0    fluticasone (FLONASE) 50 MCG/ACT nasal spray USE 2 SPRAYS IN EACH NOSTRIL DAILY 16 g 11    busPIRone (BUSPAR) 10 MG tablet Take 1 tablet by mouth 3 times daily 30 tablet 2    aspirin 81 MG tablet Take 81 mg by mouth daily      diltiazem (TIAZAC) 120 MG extended release capsule Take 120 mg by mouth daily      furosemide (LASIX) 20 MG tablet 1 tab daily as needed for swelling 30 tablet 11    losartan (COZAAR) 100 MG tablet Take 1 tablet by mouth daily 90 tablet 3    PROAIR  (90 Base) MCG/ACT inhaler INHALE 2 PUFFS BY MOUTH 4 TIMES DAILY WHEN AWAY FROM HOME 8.5 g 11    atorvastatin (LIPITOR) 40 MG tablet TAKE 1 TABLET BY MOUTH DAILY 30 tablet 11    DULoxetine (CYMBALTA) 60 MG extended release capsule Take 1 capsule by mouth 2 times daily 60 capsule 11    vitamin D (CHOLECALCIFEROL) 66440 UNIT CAPS Take 1 capsule by mouth once a week 4 capsule 11    aclidinium (TUDORZA PRESSAIR) 400 MCG/ACT AEPB inhaler Inhale 1 puff into the lungs 2 times daily 1 each 11    SYMBICORT 160-4.5 MCG/ACT AERO INHALE 2 PUFFS INTO THE LUNGS 2 TIMES DAILY 10.2 g 11    oxyCODONE-acetaminophen (PERCOCET)  MG per tablet Take by mouth .  OXYCONTIN 15 MG T12A extended release tablet   0    Respiratory Therapy Supplies (NEBULIZER COMPRESSOR) KIT 1 kit by Does not apply route once for 1 dose 1 kit 0    ipratropium-albuterol (DUONEB) 0.5-2.5 (3) MG/3ML SOLN nebulizer solution Use qid 120 vial 11     No Known Allergies    REVIEW OF SYSTEMS  10 systems reviewed, pertinent positives per HPI otherwise noted to be negative     PHYSICAL EXAM  BP (!) 187/98   Pulse 98   Resp (!) 36   Ht 5' 6\" (1.676 m)   Wt 164 lb (74.4 kg)   SpO2 100%   BMI 26.47 kg/m²   GENERAL APPEARANCE: Awake and alert. Cooperative. In moderate respiratory distress. HEAD: Normocephalic. Atraumatic. EYES: PERRL. EOM's grossly intact. ENT: Mucous membranes are pink and moist.   NECK: Supple. HEART: RRR. No murmurs. LUNGS: Respirations  Mild to moderately labored. Using accessory muscles to assist his respiratory effort Diffuse expiratory wheezing noted. Good air exchange. ABDOMEN: Soft. Non-distended. Non-tender. No masses. No organomegaly. No guarding or rebound. EXTREMITIES: No peripheral edema. Moves all extremities equally. All extremities neurovascularly intact. SKIN: Warm and dry. No acute rashes. NEUROLOGICAL: Alert and oriented. . Strength 5/5, sensation intact. Gait normal.   PSYCHIATRIC: Normal mood and affect. No HI or SI expressed to me. RADIOLOGY    See below     EKG:     See below      ED COURSE/MDM        ED Course as of Apr 18 2319   Thu Apr 18, 2019 2307 She has a troponin of 0.01   Troponin: <0.01 [DL]   2307 Lactic acid of 1.4   Lactic Acid: 1.4 [DL]   2307 , principal metabolic panel was normal except for a Co2 of 38 which may be due to his COPD. His BUN was 21 creatinine of 0.9 with a GFR greater than 60. His glucose is 121   Comprehensive metabolic panel(!):    Sodium 141   Potassium 4.3   Chloride 97(!)   CO2 38(!)   Anion Gap 6   Glucose 121(!)   BUN 21(!)   Creatinine 0.9   GFR Non- >60   GFR African American >60   Calcium 10.0   Total Protein 6.8   Albumin 4.4   Albumin/Globulin Ratio 1.8   Bilirubin <0.2   Alk Phos 126   ALT 18   AST 21   Globulin 2.4 [DL]   2308 He had a CBC drawn which revealed a white count of 9.8. An H&H of 12 and 37 with a macrocytic anemia noted   WBC: 9.8 [DL]   2308 Impression     Stable portable study       XR CHEST PORTABLE [DL]      ED Course User Index  [DL] Earnest Ta MD       Patient improved significantly with DuoNeb treatment and IV steroids. So much so to the point where he feels he is comfortable enough to go home.   I'll place him on a Z-Darrel as well as give him a burst of steroids for 3 days to treat his COPD. He was told to come back to emergency department if he has any worsening shortness of breath. And he agrees with that plan of attack. Old records were reviewed when applicable.  The ED course and plan were reviewed and results discussed with the patient    CLINICAL IMPRESSION and [address] [place] [address] Tyner was stable and diagnosed with COPD exacerbation    Patient was treated with  DuoNeb and Solu-Medrol            Travon Long MD  04/18/19 7612

## 2019-04-19 NOTE — ED NOTES
Pt to ER via family, pt states worsening SOB without CP for several days, states his reg home O2 has not been keeping up and he has had low SPO2. Pt arrived tachypneic, with spo2 of 100% on 4L NC from home. Pt denies CP, sinus tach on monitor. Pt alert, in tripod position refusing breathing tx's at this time. Cardiac monitor in place and call light within reach, will cont to monitor.       Lanita Councilman, SRINIVAS  04/18/19 6006

## 2019-04-19 NOTE — ED NOTES
Pt refusing breathing tx's multiple times, states they make him feel like he is suffocating.       Isela Zhou RN  04/18/19 2050

## 2019-04-19 NOTE — ED NOTES
RN rounded on pt. Pt medicated with steroids. Given something to drink. VSS and updated and pt and family updated on POC. Pt has no further needs at this time. Pt resting in bed, call light within reach. Pt denies any questions. Will cont to monitor.       Caryle Sleeper, RN  04/18/19 5268

## 2019-04-19 NOTE — ED NOTES
Pt DC home in good condition with RX x__2__ and follow up with PCP. V/u of Dc instructions. Denies questions or concerns. Teaching done re: s/s to report.      Lev Moyer RN  04/18/19 3292

## 2019-04-22 NOTE — TELEPHONE ENCOUNTER
Can increase to furosemide 40 mg. Will need to start on Potassium supplement. Recheck BMP and MG in 2-4 weeks.

## 2019-04-22 NOTE — TELEPHONE ENCOUNTER
Pt called requesting his water pill be increased. States the current dosage isn't keeping the fluid off and that he'd taken one of his sisters the other day and it really seemed to help. Pt uses [address]  Call back [phone]

## 2019-04-30 NOTE — TELEPHONE ENCOUNTER
Daughter was wanting to see if they could get something for panic attacks said that they are really bad he uses [address]

## 2019-05-04 PROBLEM — J44.1 COPD EXACERBATION (HCC): Status: ACTIVE | Noted: 2019-01-01

## 2019-05-04 NOTE — ED NOTES
Respiratory at bedside to place pt on bipap per request of Dr Carol Arenas.      Amari Gupta, RN  05/04/19 1038

## 2019-05-04 NOTE — CONSULTS
Pulmonary & Critical Care Consultation Note   Afshin Coppola MD    REASONFOR CONSULTATION/CC: acute exac of COPD    Consult at request of  Angelito Thompson MD  PCP: Emi Gordon MD    HISTORYOF PRESENT ILLNESS:    75y.o. year old male with severe end stage COPD, active smoker. Being managed by his PCP and has been treated multiple times in the past few months for acute exacerbations, most recently started on treatment with steroids in the past week. Has had a gradual decline in his functional status as well in the above time period. He presented to the ED with worsening shortness of breath, constant at rest and worsened with minimal exertion. Associated cough that was nonproductive, and congestion. Workup was consistent with an acute exacerbation, he was not improving with supplemental oxygen above his baseline and he was placed on BiPAP support after identification of hypercapnia on VBG. He has been intermittently confused but does arouse so direct history is limited. Past Medical History:   Diagnosis Date    Amputation finger     Ankle fracture, left     Chronic nasal congestion     Chronic pain syndrome     Compression fracture of lumbar vertebra (HCC)     COPD (chronic obstructive pulmonary disease) (HCC)     DDD (degenerative disc disease)     Depression     Facet joint disease of lumbosacral region     Hyperglycemia     Hyperlipidemia     Insomnia     Radiculopathy     Smoker's respiratory syndrome        Past Surgical History:   Procedure Laterality Date    AMPUTATION      COLONOSCOPY  05/02/2016    colonic polyps    HERNIA REPAIR         family history includes Cancer in his mother; Diabetes in his father; Heart Disease in his sister. Social History     Tobacco Use    Smoking status: Current Some Day Smoker     Packs/day: 2.00     Years: 25.00     Pack years: 50.00     Types: Cigarettes    Smokeless tobacco: Never Used   Substance Use Topics    Alcohol use:  No Alcohol/week: 0.0 oz     Comment: occasional        Scheduled Meds:   aspirin  81 mg Oral Daily    atorvastatin  40 mg Oral Daily    diltiazem  120 mg Oral Daily    DULoxetine  60 mg Oral BID    ipratropium-albuterol  1 ampule Inhalation Q4H WA    losartan  100 mg Oral Daily    mometasone-formoterol  2 puff Inhalation BID    oxyCODONE  15 mg Oral BID    methylPREDNISolone  40 mg Intravenous Q12H    sodium chloride flush  10 mL Intravenous 2 times per day    enoxaparin  40 mg Subcutaneous Daily    [START ON 5/5/2019] pantoprazole  40 mg Oral QAM AC    busPIRone  20 mg Oral TID    theophylline  100 mg Oral BID       Continuous Infusions:      PRN Meds:   LORazepam, oxyCODONE-acetaminophen, sodium chloride flush, magnesium hydroxide, acetaminophen, prochlorperazine   Inpatient consult to Pulmonology  Consult performed by: Melissa Solis MD  Consult ordered by: Shell Arevalo MD        ALLERGIES:  Patient has No Known Allergies. REVIEW OF SYSTEMS:  Review of Systems   Unable to perform ROS: Severe respiratory distress       Objective:   PHYSICAL EXAM:  /71   Pulse 93   Temp 98.2 °F (36.8 °C) (Axillary)   Resp 21   Ht 5' 7\" (1.702 m)   Wt 165 lb (74.8 kg)   SpO2 100%   BMI 25.84 kg/m²    Physical Exam   Constitutional: He appears well-developed and well-nourished. No distress. HENT:   Head: Normocephalic and atraumatic. Mouth/Throat: Oropharynx is clear and moist. No oropharyngeal exudate. Neck: Neck supple. No JVD present. Cardiovascular: Normal heart sounds. Exam reveals no gallop and no friction rub. No murmur heard. Pulmonary/Chest: He is in respiratory distress. He has wheezes. He has no rales. Equal chest rise and expansion bilaterally   Abdominal: Soft. Bowel sounds are normal. He exhibits no distension. There is no tenderness. Musculoskeletal: Normal range of motion. He exhibits no edema. Lymphadenopathy:     He has no cervical adenopathy.    Neurological: No cranial nerve deficit. confused   Skin: Skin is warm and dry. No rash noted. He is not diaphoretic. Data Reviewed:   LABS:  CBC:   Recent Labs     05/04/19  0825   WBC 11.5*   HGB 12.0*   HCT 35.8*   .6*        BMP:   Recent Labs     05/04/19  0825      K 4.1   CL 89*   CO2 44*   BUN 33*   CREATININE 0.9     LIVER PROFILE:   Recent Labs     05/04/19  0825   AST 17   ALT 14   BILITOT 0.4   ALKPHOS 80     PT/INR: No results for input(s): PROTIME, INR in the last 72 hours. APTT:No results for input(s): APTT in the last 72 hours. UA:No results for input(s): NITRITE, COLORU, PHUR, LABCAST, WBCUA, RBCUA, MUCUS, TRICHOMONAS, YEAST, BACTERIA, CLARITYU, SPECGRAV, LEUKOCYTESUR, UROBILINOGEN, BILIRUBINUR, BLOODU, GLUCOSEU, AMORPHOUS in the last 72 hours. Invalid input(s): KETONESU  No results for input(s): PHART, KUL7WXB, PO2ART in the last 72 hours. Vent Information  FiO2 : 45 %(decreased to 40%)    CXR personally reviewed, hyperinflation without acute process          Assessment:     1. Acute on chronic resp failure, mixed   -acute exac of COPD  2. Acute encephalopathy, hypercarbia  3. Leukocytosis, likely from steroids  4.  Macrocytic anemia    Plan:      -Continue bipap support, ABG pending for this afternoon  -Steroids, bronchodilators  -Will add theophylline low dose given his prior HR issues to assist with bronchodilation  -Wean FIO2 as tolerated  -anemia workup per primary service  -PCT and CXR negative, no need for atb from a pulm standpoint    Due to life threatening respiratory failure this patient is critically ill, total critical care time involved in his care was 31 mins    Maxine Durham MD

## 2019-05-04 NOTE — PROGRESS NOTES
Writer spoke with Dr. Perez Duckworth during rounds, pt tolerating bipap at this time. ABG for 1400. Writer will notify MD regarding results.   [address] [place]  5/4/2019

## 2019-05-04 NOTE — PLAN OF CARE
Problem: Falls - Risk of:  Goal: Will remain free from falls  Description  Will remain free from falls  Outcome: Met This Shift  Note:   Pt is free from falls this shift. Bed in lowest position, side rails up x3, non skid footwear in place, fall id bracelet in place. Bedside table and call light within reach. Bed alarm in place and activated. Pt calls out for needs and assistance. Problem: Pain:  Goal: Control of chronic pain  Description  Control of chronic pain  Outcome: Ongoing  Note:   Pt able to use pain scale 0-10. Pt complains of chronic back pain, medicated with scheduled oxycontin and prn percocet with slightly effective results. Problem: Breathing Pattern - Ineffective:  Goal: Ability to achieve and maintain a regular respiratory rate will improve  Description  Ability to achieve and maintain a regular respiratory rate will improve  Outcome: Ongoing  Note:   Pt on bipap this shift and when off bipap on 4L O2, O2 sats greater than 92%. Pt with labored breathing at times, tachypnea, intermittent dyspnea at rest and with exertion. Will continue to monitor.

## 2019-05-04 NOTE — PROGRESS NOTES
Four eyes skin assessment completed with Kim ESPINO, redness to coccyx, BLE heels and around L heel abrasion blanchable. Blister R foot pinky toe. Sacral heart mepilex applied. Writer will apply bilateral heel mepilex's.   [id number] [address] [place]  5/4/2019

## 2019-05-04 NOTE — PROGRESS NOTES
Pt continues with chronic back pain \"6\" at this time, prn percocet given. Will continue to monitor.   [id number] [address] [place]  5/4/2019

## 2019-05-04 NOTE — H&P
05/02/2016    colonic polyps    HERNIA REPAIR         Medications Prior to Admission:      Prior to Admission medications    Medication Sig Start Date End Date Taking? Authorizing Provider   methylPREDNISolone (MEDROL DOSEPACK) 4 MG tablet Take 4 mg by mouth See Admin Instructions Take by mouth.    Yes Historical Provider, MD   SYMBICORT 160-4.5 MCG/ACT AERO INHALE 2 PUFFS BY MOUTH 2 TIMES A DAY 4/22/19  Yes Francisca Lew MD   furosemide (LASIX) 40 MG tablet 1 tab daily as needed for swelling 4/22/19  Yes SAKSHI Pham CNP   potassium chloride (KLOR-CON M) 20 MEQ extended release tablet Take 1 tablet by mouth daily 4/22/19  Yes SAKSHI Pham CNP   fluticasone [place]) 50 MCG/ACT nasal spray USE 2 SPRAYS IN [facility] NOSTRIL DAILY 3/19/19  Yes Francisca Lew MD   busPIRone (BUSPAR) 10 MG tablet Take 1 tablet by mouth 3 times daily 3/15/19  Yes SAKSHI Pham CNP   aspirin 81 MG tablet Take 81 mg by mouth daily   Yes Historical Provider, MD   diltiazem (TIAZAC) 120 MG extended release capsule Take 120 mg by mouth daily   Yes Historical Provider, MD   losartan (COZAAR) 100 MG tablet Take 1 tablet by mouth daily 10/5/18  Yes SAKSHI Pham CNP   PROAIR  (90 Base) MCG/ACT inhaler INHALE 2 PUFFS BY MOUTH [facility]  9/21/18  Yes SAKSHI Pham CNP   atorvastatin (LIPITOR) 40 MG tablet TAKE 1 TABLET BY MOUTH DAILY 8/21/18  Yes Francisca Lew MD   DULoxetine (CYMBALTA) 60 MG extended release capsule Take 1 capsule by mouth 2 times daily 6/19/18  Yes Francisca Lew MD   vitamin D (CHOLECALCIFEROL) 09423 UNIT CAPS Take 1 capsule by mouth once a week 5/25/18  Yes Francisca Lew MD   aclidinium [facility]) 400 MCG/ACT AEPB inhaler Inhale 1 puff into the lungs 2 times daily 4/30/18  Yes Francisca Lew MD   oxyCODONE-acetaminophen (PERCOCET)  MG per tablet Take by mouth . 8/31/17  Yes Historical Provider, MD   OXYCONTIN 15 MG T12A extended release tablet  6/8/17  Yes Historical Provider, MD   ipratropium-albuterol (DUONEB) 0.5-2.5 (3) MG/3ML SOLN nebulizer solution Use qid 8/14/17  Yes Amberly Lee MD   Respiratory Therapy Supplies (NEBULIZER COMPRESSOR) KIT 1 kit by Does not apply route once for 1 dose 8/14/17 7/2/18  Amberly Lee MD       Allergies:  Patient has no known allergies. Social History:      The patient currently lives at home. TOBACCO:   reports that he has been smoking cigarettes. He has a 50.00 pack-year smoking history. He has never used smokeless tobacco.  ETOH:   reports that he does not drink alcohol. Family History:      Reviewed in detail and negative for ESRD. Positive as follows:        Problem Relation Age of Onset   [facility] Cancer Mother     Diabetes Father     Heart Disease Sister        REVIEW OF SYSTEMS:   Pertinent positives as noted in the HPI. All other systems reviewed and negative. PHYSICAL EXAM PERFORMED:    /82   Pulse 85   Temp 98.2 °F (36.8 °C) (Axillary)   Resp 20   Ht 5' 7\" (1.702 m)   Wt 165 lb (74.8 kg)   SpO2 100%   BMI 25.84 kg/m²     General appearance:  No apparent distress, appears stated age and cooperative. HEENT:  Normal cephalic, atraumatic without obvious deformity. Pupils equal, round, and reactive to light. Extra ocular muscles intact. Conjunctivae/corneas clear. Neck: Supple, with full range of motion. No jugular venous distention. Trachea midline. Respiratory:  Increased work of breathing. Clear to auscultation, bilaterally without Rales/Wheezes/Rhonchi. Diminished throughout. Pursed lips, prolonged expiratory phase. Cardiovascular:  Regular rate and rhythm with normal S1/S2 without murmurs, rubs or gallops. Abdomen: Soft, non-tender, non-distended with normal bowel sounds. Musculoskeletal:  No clubbing, cyanosis. 1+ BLE pitting edema.   Full range of motion without deformity. Skin: Skin color, texture, turgor normal.  No rashes or lesions. Neurologic:  Neurovascularly intact without any focal sensory/motor deficits. Cranial nerves: II-XII intact, grossly non-focal.  Psychiatric:  Alert, oriented to self and place, impulsive, limited insight  Capillary Refill: Brisk,< 3 seconds   Peripheral Pulses: +2 palpable, equal bilaterally       Labs:     Recent Labs     05/04/19  0825   WBC 11.5*   HGB 12.0*   HCT 35.8*        Recent Labs     05/04/19  0825      K 4.1   CL 89*   CO2 44*   BUN 33*   CREATININE 0.9   CALCIUM 9.2     Recent Labs     05/04/19  0825   AST 17   ALT 14   BILITOT 0.4   ALKPHOS 80     No results for input(s): INR in the last 72 hours. Recent Labs     05/04/19 0825   TROPONINI 0.02*       Urinalysis:      Lab Results   Component Value Date    NITRU Negative 02/13/2019    BLOODU Negative 02/13/2019    SPECGRAV <=1.005 02/13/2019    GLUCOSEU Negative 02/13/2019       Radiology:     CXR: I have reviewed the CXR with the following interpretation: clear lungs  EKG:  I have reviewed the EKG with the following interpretation: sinus, low voltage, nonspecific abnormalities    XR CHEST PORTABLE   Final Result   No acute process. ASSESSMENT:    Active Hospital Problems    Diagnosis Date Noted    COPD exacerbation ([facility] [address] [J44.1] 05/04/2019    Tobacco abuse [Z72.0] 02/04/2019    Mixed hyperlipidemia [E78.2] 02/13/2017         PLAN:    The patient is a 75 Y M with a h/o COPD on 2L NC, HLD, and dementia. He lives at home with his sister, who is in fairly good health and takes care of him. His daughter accompanied him to the ED and says that for the last few months he has had a progressive decline. He has become more forgetful and less aware of what is going on around him, has become generally anxious, and is less compliant with his medications.   When the patient randomly asks me if I like to go [place] hunting she says this is not unusual for him. His baseline respiratory status seems to have also deteriorated over the last few months. It is normal for him to feel dyspneic and have increased work of breathing. The daughter says that for months now he has often phoned her in a panic complaining of shortness of breath, saying that he is going to die. For the last few days his dyspnea seems to have acutely worsened, and he is working even harder than normal to breath, so she brought him to the ED. AECOPD, with acute on chronic hypercapnic respiratory failure  - steroids, inhaled bronchodilators  - CXR clear, procalcitonin negative. No abx. Leukocytosis is probably from outpatient steroids. - required BiPAP in the ED  - wean to baseline [facility]  - pulmonary consulted    Anxiety  - PCP recently increased his buspirone, also on duloxetine. He would be high risk for chronic outpatient us of acutely sedating anxiolytics. Had to give lorazepam to get him to initially tolerate the BiPAP in the ED. HTN  - dilt, losartan    Trop elevation  - likely demand ischemia. The patient denies chest pain. EKG is not suggestive of ischemia. No further workup anticipated. HLD  - statin    Venous stasis edema  - clinically not CHF. Compression, elevation. He rarely takes his PRN furosemide, which seems appropriate. Chronic pain  - he has been on opioids for decades. Continued here. DVT Prophylaxis: enoxaparin  Diet: DIET GENERAL;  Code Status: Limited    PT/OT Eval Status: consider when more stable. Dispo - ready when respiratory status is stable, perhaps 5/6-9. He lives at home with family. Ruiz Downey MD    Thank you Zenon Alvarado MD for the opportunity to be involved in this patient's care. If you have any questions or concerns please feel free to contact me at [phone].

## 2019-05-04 NOTE — ED PROVIDER NOTES
[facility] C4 PCU  eMERGENCY dEPARTMENT eNCOUnter      Pt Name: Abdulkadir Mejia  MRN: [id number]  Nicholas [id number]  Date of evaluation: 5/4/2019  Provider: Diamante Hatch MD    CHIEF COMPLAINT       Chief Complaint   Patient presents with    Shortness of Breath     all week. saw pmd this week and placed on medrol dose gaby    Cough         HISTORY OF PRESENT ILLNESS   (Location/Symptom, Timing/Onset,Context/Setting, Quality, Duration, Modifying Factors, Severity)  Note limiting factors. HPI: Abdulkadir Mejia is a 75 y.o. male, with hx of COPD, who presents to the emergency department with shortness of breath and cough. Pt seen by PMD earlier this week and started on medrol dose pack. Patient is on 2 L oxygen at home and is requiring 4 L in the ED. He is unsure if he said any fever or chills, but he notes increased dyspnea despite using breathing treatments at home. Patient denies any sore throat. He does note that his cough is different. NursingNotes were reviewed. REVIEW OF SYSTEMS    (2-9 systems for level 4, 10 or more for level 5)     Review of Systems   Constitutional: Positive for chills and fatigue. Negative for fever. HENT: Negative. Negative for sore throat. Eyes: Negative. Respiratory: Positive for cough, chest tightness and shortness of breath. Cardiovascular: Negative. Gastrointestinal: Negative. Genitourinary: Negative. Musculoskeletal: Negative. Neurological: Negative. Psychiatric/Behavioral: Negative. Except as noted above the remainder of the review of systems was reviewed and negative.      PAST MEDICAL HISTORY     Past Medical History:   Diagnosis Date    Amputation finger     Ankle fracture, left     Chronic nasal congestion     Chronic pain syndrome     Compression fracture of lumbar vertebra (HCC)     COPD (chronic obstructive pulmonary disease) (HCC)     DDD (degenerative disc disease)     Depression     Facet joint disease of lumbosacral region TAKE 1 TABLET BY MOUTH DAILY  Qty: 30 tablet, Refills: 11    Associated Diagnoses: Mixed hyperlipidemia      DULoxetine (CYMBALTA) 60 MG extended release capsule Take 1 capsule by mouth 2 times daily  Qty: 60 capsule, Refills: 11    Associated Diagnoses: Compression fracture of lumbar vertebra, sequela; Post-traumatic osteoarthritis of multiple joints; Chronic pain syndrome; Facet joint disease of lumbosacral region; Reactive depression; Lumbosacral radiculopathy; Degeneration of intervertebral disc of lumbar region; Chronic midline low back pain with bilateral sciatica; Primary insomnia      vitamin D (CHOLECALCIFEROL) 56194 UNIT CAPS Take 1 capsule by mouth once a week  Qty: 4 capsule, Refills: 11    Associated Diagnoses: Vitamin D deficiency      aclidinium (TUDORZA PRESSAIR) 400 MCG/ACT AEPB inhaler Inhale 1 puff into the lungs 2 times daily  Qty: 1 each, Refills: 11    Associated Diagnoses: Mucopurulent chronic bronchitis (HCC)      oxyCODONE-acetaminophen (PERCOCET)  MG per tablet Take by mouth . Associated Diagnoses: Closed compression fracture of third lumbar vertebra, sequela; Chronic pain syndrome      OXYCONTIN 15 MG T12A extended release tablet Refills: 0    Associated Diagnoses: Degeneration of intervertebral disc of lumbar region; Chronic low back pain, unspecified back pain laterality, with sciatica presence unspecified      ipratropium-albuterol (DUONEB) 0.5-2.5 (3) MG/3ML SOLN nebulizer solution Use qid  Qty: 120 vial, Refills: 11    Associated Diagnoses: Mucopurulent chronic bronchitis (HCC)      Respiratory Therapy Supplies (NEBULIZER COMPRESSOR) KIT 1 kit by Does not apply route once for 1 dose  Qty: 1 kit, Refills: 0    Associated Diagnoses: Right-sided chest wall pain             ALLERGIES     Patient has no known allergies.     FAMILY HISTORY       Family History   Problem Relation Age of Onset    Cancer Mother     Diabetes Father     Heart Disease Sister        SOCIAL HISTORY Social History     Socioeconomic History    Marital status:      Spouse name: None    Number of children: None    Years of education: None    Highest education level: None   Occupational History    None   Social Needs    Financial resource strain: None    Food insecurity:     Worry: None     Inability: None    Transportation needs:     Medical: None     Non-medical: None   Tobacco Use    Smoking status: Current Some Day Smoker     Packs/day: 2.00     Years: 25.00     Pack years: 50.00     Types: Cigarettes    Smokeless tobacco: Never Used   Substance and Sexual Activity    Alcohol use: No     Alcohol/week: 0.0 oz     Comment: occasional    Drug use: No    Sexual activity: Not Currently   Lifestyle    Physical activity:     Days per week: None     Minutes per session: None    Stress: None   Relationships    Social connections:     Talks on phone: None     Gets together: None     Attends Yarsanism service: None     Active member of club or organization: None     Attends meetings of clubs or organizations: None     Relationship status: None    Intimate partner violence:     Fear of current or ex partner: None     Emotionally abused: None     Physically abused: None     Forced sexual activity: None   Other Topics Concern    None   Social History Narrative    None       SCREENINGS             PHYSICAL EXAM    (up to 7 for level 4, 8 or more for level 5)     ED Triage Vitals [05/04/19 0825]   BP Temp Temp Source Pulse Resp SpO2 Height Weight   132/74 98.3 °F (36.8 °C) Oral 96 (!) 32 98 % 5' 7\" (1.702 m) 165 lb (74.8 kg)       Physical Exam   Constitutional: He is oriented to person, place, and time. He appears well-developed and well-nourished. He appears distressed (fatigued). HENT:   Head: Normocephalic and atraumatic. Nose: Nose normal.   Mouth/Throat: Oropharynx is clear and moist.   Eyes: EOM are normal.   Neck: Normal range of motion. Neck supple.    Cardiovascular: Normal rate and Calculation (Bazett) 423 ms    P Axis 54 degrees    R Axis 3 degrees    T Axis 51 degrees    Diagnosis       Sinus rhythm with Fusion complexesLow voltage QRSBorderline ECGWhen compared with ECG of 16-FEB-2019 13:44,Previous ECG has undetermined rhythm, needs reviewConfirmed by Located within [facility] HALEY ATWOOD MD ([id number]) on 5/4/2019 11:16:28 AM     Xr Chest Portable    Result Date: 5/4/2019  EXAMINATION: SINGLE XRAY VIEW OF THE CHEST 5/4/2019 9:04 am COMPARISON: April 18, 2018 HISTORY: ORDERING SYSTEM PROVIDED HISTORY: dyspnea, PNA concern TECHNOLOGIST PROVIDED HISTORY: Reason for exam:->dyspnea, PNA concern Ordering Physician Provided Reason for Exam: dyspnea, PNA concern Acuity: Acute Type of Exam: Initial FINDINGS: The lungs are without acute focal process. There is no effusion or pneumothorax. The cardiomediastinal silhouette is without acute process. The osseous structures are without acute process. No acute process. All other labs were within normal range or not returned as of this dictation. EMERGENCY DEPARTMENT COURSE and DIFFERENTIAL DIAGNOSIS/MDM:   Vitals:    Vitals:    05/04/19 1130 05/04/19 1212 05/04/19 1214 05/04/19 1247   BP: 118/82   125/71   Pulse: 85   93   Resp: 20  21    Temp: 98.2 °F (36.8 °C)      TempSrc: Axillary      SpO2: 100% 100%  100%   Weight:       Height:           MDM: Concern for new oxygen need and COPD exacerbation is felt that outpatient management with Dosepak. We'll give neb treatment, check chest x-ray, and evaluate cardiac labs. Workup is remarkable for increased oxygen need, from 2 L to 4 L, no acute pneumonia seen on chest x-ray. Patient also with a positive troponin. Denies any chest pain and has no EKG changes. This may be due to demand ischemia. We'll admit patient for further evaluation and treatment. CRITICAL CARE TIME   Total Critical Care time was 0 minutes, excluding separately reportable procedures.       CONSULTS:  IP CONSULT TO HOSPITALIST  IP CONSULT TO PULMONOLOGY    PROCEDURES:  Unless otherwise noted below, none     Procedures    FINAL IMPRESSION      1. COPD exacerbation ([address]    2. Hypoxia    3. Troponin level elevated          DISPOSITION/PLAN   DISPOSITION        PATIENT REFERRED TO:  Francisca Lew MD  [address] [place] [address]  [phone]            DISCHARGE MEDICATIONS:  Current Discharge Medication List             (Please note that portions of this note were completed with a voice recognition program.Efforts were made to edit the dictations but occasionally words are mis-transcribed.)    Steffanie Urbina MD (electronically signed)  Attending Emergency Physician        Ranjith Arizmendi MD  05/04/19 7820

## 2019-05-04 NOTE — PROGRESS NOTES
05/04/19 1415   Oxygen Therapy/Pulse Ox   O2 Therapy Oxygen   O2 Device PAP (positive airway pressure)   O2 Flow Rate (L/min) 40 L/min   SpO2 99 %   Blood Gas  Performed? Yes   $ABG $Yes   Salvador's Test #1 Pos   Site #1 Right Radial   Site Prepped #1 Yes   Number of Attempts #1 2   Pressure Held #1 Yes   Complications #1 None   Post-procedure #1 Standard   Specimen Status #1 To lab   How Tolerated?  Tolerated well

## 2019-05-04 NOTE — PROGRESS NOTES
Lab called with panic CO2 73.8. Writer sending Dr. Eli Lance message regarding. [facility] M[facility]  5/4/2019    Writer received call from Dr. Eli Lance, ok for pt to come off bipap to eat, to go back on bipap this evening and overnight.   [id number] [place]  5/4/2019

## 2019-05-04 NOTE — PROGRESS NOTES
Torri served Dr. Humberto Laws @ [phone] 5/4/19 re: pulmonary consult. -3270 [facility]

## 2019-05-04 NOTE — ED NOTES
Pt tolerated bipap well at this time. Report to C4 nurse and pt will be transported via stretcher and still on bipap with respiratory present.      Naomie Riggs RN  05/04/19 [id number]

## 2019-05-04 NOTE — PROGRESS NOTES
RESPIRATORY THERAPY ASSESSMENT    Name:  Brandon Copeland Record Number:  [id number]  Age: 75 y.o. Gender: male  : 1945  Today's Date:  2019  Room:  259/2901-99    Assessment     Is the patient being admitted for a COPD or Asthma exacerbation? No   (If yes the patient will be seen every 4 hours for the first 24 hours and then reassessed)    Patient Admission Diagnosis      Allergies  No Known Allergies    Minimum Predicted Vital Capacity:     NA          Actual Vital Capacity:      NA(pt on continuous Bipap from ER)              Pulmonary History:COPD  Home Oxygen Therapy:  2.5 liters/min via nasal cannula  Home Respiratory Therapy:Symbicort, Albuterol   Current Respiratory Therapy:  Duoneb,Dulera  Treatment Type: HHN  Medications: Albuterol/Ipratropium    Respiratory Severity Index(RSI)   Patients with orders for inhalation medications, oxygen, or any therapeutic treatment modality will be placed on Respiratory Protocol. They will be assessed with the first treatment and at least every 72 hours thereafter. The following severity scale will be used to determine frequency of treatment intervention.     Smoking History: Mild Exacerbation = 3    Social History  Social History     Tobacco Use    Smoking status: Current Some Day Smoker     Packs/day: 2.00     Years: 25.00     Pack years: 50.00     Types: Cigarettes    Smokeless tobacco: Never Used   Substance Use Topics    Alcohol use: No     Alcohol/week: 0.0 oz     Comment: occasional    Drug use: No       Recent Surgical History: None = 0  Past Surgical History  Past Surgical History:   Procedure Laterality Date    AMPUTATION      COLONOSCOPY  2016    colonic polyps    HERNIA REPAIR         Level of Consciousness: Alert, Follows Commands but Disoriented = 1    Level of Activity: Walking with assistance = 1    Respiratory Pattern: Regular Pattern; RR 8-20 = 0    Breath Sounds: Diminshed bilaterally and/or crackles = 2    Sputum   ,

## 2019-05-05 NOTE — PLAN OF CARE
Problem: Falls - Risk of:  Goal: Will remain free from falls  Description  Will remain free from falls  Outcome: Met This Shift  Note:   Pt is free from falls this shift. Bed in lowest position, side rails up x2, non skid footwear in place, fall id bracelet in place, bedside table and call light within reach. Bed alarm in place and activated. Pt calls out for needs and assistance. Problem: Pain:  Goal: Control of chronic pain  Description  Control of chronic pain  Outcome: Ongoing  Note:   Pt able to use pain scale 0-10. Pt complains of chronic back pain this shift, medicated with scheduled oxycontin and prn percocet with some effectiveness. Pt able to turn and reposition self independently. Problem: Breathing Pattern - Ineffective:  Goal: Ability to achieve and maintain a regular respiratory rate will improve  Description  Ability to achieve and maintain a regular respiratory rate will improve  Outcome: Ongoing  Note:   Pt with expiratory wheezes through out lung fields this shift. Intermittent dyspnea at rest, dyspnea with exertion. Continuous pulse ox in place. O2 sats greater than 92% on 3.5L O2 per nasal cannula or bipap. Bipap now prn.

## 2019-05-05 NOTE — PROGRESS NOTES
05/04/19 2228   NIV Type   Mode BIPAP   Mask Type Full face mask   Mask Size Large   Settings/Measurements   Comfort Level Good   Using Accessory Muscles No   IPAP 12 cmH20   EPAP 6 cmH2O   Resp 26   FiO2  40 %   Vt Exhaled 866 mL   Mask Leak (lpm) 27 lpm   Skin Protection for O2 Device Yes   Alarm Settings   Alarms On Y   Press Low Alarm 6 cmH2O   High Pressure Alarm 40 cmH2O   Apnea (secs) 45 secs   Resp Rate Low Alarm 6   High Respiratory Rate 45 br/min

## 2019-05-05 NOTE — PROGRESS NOTES
05/05/19 0401   NIV Type   Mode BIPAP   Mask Type Full face mask   Mask Size Large   Settings/Measurements   Comfort Level Good   Using Accessory Muscles No   IPAP 12 cmH20   EPAP 6 cmH2O   Resp 20   FiO2  40 %   Vt Exhaled 816 mL   Mask Leak (lpm) 67 lpm   Skin Protection for O2 Device Yes   Alarm Settings   Alarms On Y   Press Low Alarm 6 cmH2O   High Pressure Alarm 40 cmH2O   Apnea (secs) 45 secs   Resp Rate Low Alarm 6   High Respiratory Rate 45 br/min

## 2019-05-05 NOTE — PROGRESS NOTES
pain  Pain Location: Back  Pain Orientation: Mid  Oxygen Therapy  SpO2: 97 %  Pulse Oximeter Device Mode: Continuous  Pulse Oximeter Device Location: Finger  O2 Device: Nasal cannula  O2 Flow Rate (L/min): 3.5 L/min  Social/Functional History  Social/Functional History  Lives With: Family(sister)  Type of Home: House  Home Layout: Two level, Able to Live on Main level with bedroom/bathroom  Home Access: Stairs to enter with rails  Entrance Stairs - Number of Steps: 4  Bathroom Shower/Tub: Tub/Shower unit  Bathroom Equipment: Shower chair, Grab bars in shower  Home Equipment: Oxygen(2 -2. 5 L O 2)  Receives Help From: Family  ADL Assistance: Independent(difficulty with dressing with socks & getting in/out tub/shower)  Homemaking Responsibilities: No  Ambulation Assistance: Independent(without AD)  Transfer Assistance: Independent  Active : Yes  Mode of Transportation: Car  Occupation: Retired  Type of occupation:   Leisure & Hobbies: trap shooting        Objective   Vision: Impaired  Vision Exceptions: Wears glasses at all times  Hearing: Exceptions to [facility]  Hearing Exceptions: Hard of hearing/hearing concerns    Orientation  Overall Orientation Status: Impaired  Orientation Level: Disoriented to time;Oriented to situation;Oriented to person;Oriented to place     Balance  Sitting Balance: Supervision  Standing Balance: Stand by assistance(due to portable O 2 tank)  Standing Balance  Time: 7 minutes  Functional Mobility  Functional - Mobility Device: No device  Activity: (functional mobility)  Assist Level: Stand by assistance  ADL  Feeding: Setup  LE Dressing: Independent    RUE Tone: Normotonic  LUE Tone: Normotonic     Bed mobility  Supine to Sit: Modified independent  Sit to Supine: Unable to assess(Left up in chair for breakfast, pt agreeable)  Transfers  Sit to stand: Supervision  Stand to sit: Supervision  Vision - Basic Assessment  Prior Vision: Wears glasses all the

## 2019-05-05 NOTE — PROGRESS NOTES
Pt is requesting flonase nasal spray. Writer sent Dr. Taz Brunson message regarding.   [facility]  5/5/2019

## 2019-05-05 NOTE — PROGRESS NOTES
Risk  Position Activity Restriction  Other position/activity restrictions: High fall risk, 3.5 L O 2, telemetry with continuous pulse Oximeter, Up with assist        Subjective  General  Chart Reviewed: Yes  Patient assessed for rehabilitation services?: Yes  Family / Caregiver Present: Yes(dtr asleep )  Referring Practitioner: Shavon Bacon MD  Referral Date : 05/04/19  Diagnosis: COPD exacerbation  Follows Commands: Within Functional Limits  General Comment  Comments: Pt resting in bed upon arrival   Subjective  Subjective: Pt agreeable to PT  eval  Pain Screening  Patient Currently in Pain: Yes  Pain Assessment  Pain Assessment: 0-10  Pain Level: 7  Pain Type: Chronic pain  Pain Location: Back  Vital Signs  Patient Currently in Pain: Yes       Orientation  Orientation  Overall Orientation Status: Within Functional Limits  Social/Functional History  Social/Functional History  Lives With: Family(sister)  Type of Home: House  Home Layout: Two level, Able to Live on Main level with bedroom/bathroom  Home Access: Stairs to enter with rails  Entrance Stairs - Number of Steps: 4  Bathroom Shower/Tub: Tub/Shower unit  Bathroom Equipment: Shower chair, Grab bars in shower  Home Equipment: Oxygen(2 -2. 5 L O 2)  Receives Help From: Family  ADL Assistance: Independent(difficulty with dressing with socks & getting in/out tub/shower)  Homemaking Responsibilities: No  Ambulation Assistance: Independent(without AD)  Transfer Assistance: Independent  Active : Yes  Mode of Transportation: Car  Occupation: Retired  Type of occupation:   Leisure & Hobbies: trap shooting     AROM RLE (degrees)  RLE AROM: WFL  AROM LLE (degrees)  LLE AROM : WFL  Strength RLE  Strength RLE: WFL  Strength LLE  Strength LLE: WFL     Sensation  Overall Sensation Status: WFL  Bed mobility  Supine to Sit: Modified independent  Sit to Supine: Unable to assess  Transfers  Sit to Stand: Contact guard assistance  Stand to sit: Contact guard assistance  Ambulation  Ambulation?: Yes  Ambulation 1  Surface: level tile  Device: No Device  Other Apparatus: O2(4L)  Assistance: Contact guard assistance  Quality of Gait: slow cheryl, mildly unsteady but no LOBs   Distance: 100 ft X2 with 3 min standing rest break between bouts  Comments: O2 sats 93-95% while ambulating on 4L O2   Stairs/Curb  Stairs?: Yes  Stairs  # Steps : 4  Stairs Height: 6\"  Rails: Bilateral  Curbs: 6\"  Device: No Device  Assistance: Contact guard assistance  Comment: alternating pattern     Balance  Sitting - Static: Good  Sitting - Dynamic: Good  Standing - Static: Good  Standing - Dynamic: Fair;+        Plan   Plan  Times per week: 3-5X/week  Times per day: Daily  Current Treatment Recommendations: Strengthening, Gait Training, ROM, Balance Training, Stair training, Functional Mobility Training, Endurance Training, Safety Education & Training, Transfer Training  Safety Devices  Type of devices: Left in chair, Chair alarm in place, Nurse notified, Call light within reach, Gait belt      AM-PAC Score  AM-PAC Inpatient Mobility Raw Score : 20  AM-PAC Inpatient T-Scale Score : 47.67  Mobility Inpatient CMS 0-100% Score: 35.83  Mobility Inpatient CMS G-Code Modifier : CJ          Goals  Short term goals  Time Frame for Short term goals: 5 days   Short term goal 1: Pt to be I with transfers  Short term goal 2: Pt to ambulate 200 ft (I) with O2 sats>90%  Short term goal 3: Pt to ascend/descend 4 steps with bilat HR at mod (I)  Short term goal 4: Pt to tolerate X10-15 reps of BLE ex to improve strength and endurance  Patient Goals   Patient goals : go home       Therapy Time   Individual Concurrent Group Co-treatment   Time In 0755         Time Out 0818         Minutes 23         Timed Code Treatment Minutes: [address] Jos[address] Perez, KANIKA

## 2019-05-05 NOTE — PROGRESS NOTES
/70   Pulse 88   Temp 98.3 °F (36.8 °C) (Oral)   Resp 18   Ht 5' 7\" (1.702 m)   Wt 180 lb 12.4 oz (82 kg)   SpO2 97%   BMI 28.31 kg/m²  on 3.5L O2 per nasal cannula. Pt complains of chronic lower back pain \"7\", scheduled oxycontin given. Pt denies shortness of breath at this time. Intermittent dyspnea at rest, dyspnea with exertion. Lungs with expiratory wheezes through out. Pt with non productive cough. NSR on tele. Pt encouraged to turn and reposition frequently, explained importance, with pt verbalizing understanding. Preventative sacral heart in place on coccyx. BLE heels red, blanchable, bilateral heel mepilex's in place. BLE elevated off bed onto pillow with heels floated off pillow at this time. Pt with friend at bedside. Pt denies any other needs at this time. Bedside table and call light within reach. Bed alarm in place and activated. Pt instructed to call out for needs and assistance. Will continue to monitor.   [facility]  5/5/2019

## 2019-05-05 NOTE — PROGRESS NOTES
Pulmonary & Critical Care Inpatient Progress Note   Amarilis Carranza MD     REASON FOR TODAY'S VISIT:  Acute on chronic resp failure    SUBJECTIVE:   Off bipap support, feels better  Still short of breath at rest and moving minimal air     Scheduled Meds:   fluticasone  1 spray Each Nare Daily    aspirin  81 mg Oral Daily    atorvastatin  40 mg Oral Daily    diltiazem  120 mg Oral Daily    DULoxetine  60 mg Oral BID    ipratropium-albuterol  1 ampule Inhalation Q4H WA    losartan  100 mg Oral Daily    mometasone-formoterol  2 puff Inhalation BID    oxyCODONE  15 mg Oral BID    methylPREDNISolone  40 mg Intravenous Q12H    sodium chloride flush  10 mL Intravenous 2 times per day    enoxaparin  40 mg Subcutaneous Daily    pantoprazole  40 mg Oral QAM AC    busPIRone  20 mg Oral TID    theophylline  100 mg Oral BID       Continuous Infusions:      PRN Meds:  sodium chloride, LORazepam, oxyCODONE-acetaminophen, sodium chloride flush, magnesium hydroxide, acetaminophen, prochlorperazine    ALLERGIES:  Patient has No Known Allergies. Objective:   PHYSICAL EXAM:  /67   Pulse 98   Temp 98.3 °F (36.8 °C) (Oral)   Resp 18   Ht 5' 7\" (1.702 m)   Wt 180 lb 12.4 oz (82 kg)   SpO2 97%   BMI 28.31 kg/m²    Physical Exam   Constitutional: He appears well-developed and well-nourished. No distress. HENT:   Head: Normocephalic and atraumatic. Mouth/Throat: Oropharynx is clear and moist. No oropharyngeal exudate. Eyes: Pupils are equal, round, and reactive to light. EOM are normal.   Neck: Neck supple. No JVD present. Cardiovascular: Normal heart sounds. Exam reveals no gallop and no friction rub. No murmur heard. Pulmonary/Chest: He has wheezes. He has no rales. diminished   Abdominal: Soft. Bowel sounds are normal. He exhibits no distension. There is no tenderness. Musculoskeletal: Normal range of motion. He exhibits no edema. Lymphadenopathy:     He has no cervical adenopathy. Neurological: He is alert. No cranial nerve deficit. CN 2-12 grossly intact   Skin: Skin is warm and dry. No rash noted. He is not diaphoretic. Data Reviewed:   LABS:  CBC:  Recent Labs     05/04/19  0825 05/05/19  0441   WBC 11.5* 9.6   HGB 12.0* 10.9*   HCT 35.8* 32.0*   .6* 102.2*    252     BMP:  Recent Labs     05/04/19  0825 05/05/19  0441    133*   K 4.1 4.4   CL 89* 87*   CO2 44* 37*   BUN 33* 36*   CREATININE 0.9 0.9     LIVER PROFILE:   Recent Labs     05/04/19  0825   AST 17   ALT 14   BILITOT 0.4   ALKPHOS 80     PT/INR:No results for input(s): PROTIME, INR in the last 72 hours. APTT: No results for input(s): APTT in the last 72 hours. UA:No results for input(s): NITRITE, COLORU, PHUR, LABCAST, WBCUA, RBCUA, MUCUS, TRICHOMONAS, YEAST, BACTERIA, CLARITYU, SPECGRAV, LEUKOCYTESUR, UROBILINOGEN, BILIRUBINUR, BLOODU, GLUCOSEU, AMORPHOUS in the last 72 hours. Invalid input(s): KETONESU  Recent Labs     05/04/19  1422   PHART 7.387   REV9SLF 73.8*   PO2ART 109.1*       Vent Information  FiO2 : 40 %      CXR personally reviewed, hyperinflation without acute process            Assessment:     1. Acute on chronic resp failure, mixed              -acute exac of COPD  2. Acute encephalopathy, hypercarbia  3. Leukocytosis, likely from steroids  4.  Macrocytic anemia    Plan:      -Continue intermittent bipap support  -Bronchodilators, steroids  -Theophylline for one more day, high risk for adverse effects with his pre-existing HR issues  -Wean down FIO2 as tolerated  -Anemia workup per primary service, down from admission    Amarilis Carranza MD

## 2019-05-05 NOTE — PROGRESS NOTES
Hospitalist Progress Note      PCP: Puja Canales MD    Date of Admission: 5/4/2019    Chief Complaint: dyspnea    Hospital Course: The patient is a 75 Y M with a h/o COPD on 2L NC, HLD, and dementia. He lives at home with his sister, who is in fairly good health and takes care of him. His daughter accompanied him to the ED and says that for the last few months he has had a progressive decline. He has become more forgetful and less aware of what is going on around him, has become generally anxious, and is less compliant with his medications. When the patient randomly asks me if I like to go [address] [id number] travel hunting she says this sort of behavior is not unusual for him. His baseline respiratory status seems to have also deteriorated over the last few months. It is normal for him to feel dyspneic and have increased work of breathing. The daughter says that for months now he has often phoned her in a panic complaining of shortness of breath, saying that he is going to die. For the last few days his dyspnea seems to have acutely worsened, and he is working even harder than normal to breath, so she brought him to the ED. Subjective:  He is remarkably better today compared to when he was admitted yesterday. Thoughts are logical, he is no longer breathing heavily, says he feels great. I thought I was in the wrong patient's room at first.  I think nightly BiPAP is going to be very important for this patient, will appreciate pulmonary input. He now has breath sounds and is wheezing, yesterday he was quite diminished.        Medications:  Reviewed    Infusion Medications   Scheduled Medications    fluticasone  1 spray Each Nare Daily    theophylline  100 mg Oral BID    aspirin  81 mg Oral Daily    atorvastatin  40 mg Oral Daily    diltiazem  120 mg Oral Daily    DULoxetine  60 mg Oral BID    ipratropium-albuterol  1 ampule Inhalation Q4H WA    losartan  100 mg Oral Daily    05/04/19  0825 05/05/19  0441    133*   K 4.1 4.4   CL 89* 87*   CO2 44* 37*   BUN 33* 36*   CREATININE 0.9 0.9   CALCIUM 9.2 9.2     Recent Labs     05/04/19  0825   AST 17   ALT 14   BILITOT 0.4   ALKPHOS 80     No results for input(s): INR in the last 72 hours. Recent Labs     05/04/19  0825 05/04/19  1552 05/05/19  0441   TROPONINI 0.02* <0.01 <0.01       Urinalysis:      Lab Results   Component Value Date    NITRU Negative 02/13/2019    BLOODU Negative 02/13/2019    SPECGRAV <=1.005 02/13/2019    GLUCOSEU Negative 02/13/2019       Radiology:  XR CHEST PORTABLE   Final Result   No acute process. Assessment/Plan:    Active Hospital Problems    Diagnosis Date Noted    COPD exacerbation ([facility] [address] [J44.1] 05/04/2019    Tobacco abuse [Z72.0] 02/04/2019    Mixed hyperlipidemia [E78.2] 02/13/2017       The patient is a 75 Y M with a h/o COPD on 2L NC, HLD, and dementia. He lives at home with his sister, who is in fairly good health and takes care of him. His daughter accompanied him to the ED and says that for the last few months he has had a progressive decline. He has become more forgetful and less aware of what is going on around him, has become generally anxious, and is less compliant with his medications. When the patient randomly asks me if I like to go squirrel hunting she says this sort of behavior is not unusual for him. His baseline respiratory status seems to have also deteriorated over the last few months. It is normal for him to feel dyspneic and have increased work of breathing. The daughter says that for months now he has often phoned her in a panic complaining of shortness of breath, saying that he is going to die. For the last few days his dyspnea seems to have acutely worsened, and he is working even harder than normal to breath, so she brought him to the ED.           AECOPD, with acute on chronic hypercapnic respiratory failure  - steroids, inhaled bronchodilators  - CXR clear, procalcitonin negative. No abx. Leukocytosis is probably from outpatient steroids. - required BiPAP in the ED  - wean to baseline hospitals - [facility]  - pulmonary consulted     Anxiety  - PCP recently increased his buspirone, also on duloxetine. He would be high risk for chronic outpatient us of acutely sedating anxiolytics. Had to give lorazepam to get him to initially tolerate the BiPAP in the ED, avoid further use.      HTN  - dilt, losartan     Trop elevation  - likely demand ischemia. The patient denies chest pain. EKG is not suggestive of ischemia. No further workup anticipated.      HLD  - statin     Venous stasis edema  - clinically not CHF. Compression, elevation. He rarely takes his PRN furosemide, which seems appropriate.     Chronic pain  - he has been on opioids for decades. Continued here. DVT Prophylaxis: enoxaparin  Diet: DIET GENERAL;  Code Status: Limited    PT/OT Eval Status: eval ordered    Dispo - ready when respiratory status is stable, perhaps 5/6-9. He lives at home with family.          Elli Malloy MD

## 2019-05-06 PROBLEM — D53.9 MACROCYTIC ANEMIA: Status: ACTIVE | Noted: 2019-01-01

## 2019-05-06 PROBLEM — J96.22 ACUTE ON CHRONIC RESPIRATORY FAILURE WITH HYPERCAPNIA (HCC): Status: ACTIVE | Noted: 2019-01-01

## 2019-05-06 NOTE — CARE COORDINATION
[address] Transitions Interview     2019    Patient: Addison Lombard Patient :    MRN: [id number]  Reason for Admission: COPD   RARS: Readmission Risk Score: 21         Spoke with: Addison Lombard and sister Greta       Readmission Risk  Patient Active Problem List   Diagnosis    OA (osteoarthritis)    Hyperglycemia    Chronic pain syndrome    Chronic nasal congestion    Amputation finger    Smoker's respiratory syndrome    DDD (degenerative disc disease)    Ankle fracture, left    Compression fracture of lumbar vertebra    Facet joint disease of lumbosacral region    Radiculopathy    Seborrheic keratosis    Chronic rhinosinusitis    Chronic ankle pain    Back pain    Opiate analgesic contract exists    Primary insomnia    Degeneration of intervertebral disc of lumbar region    Chronic low back pain    Coronary artery disease due to lipid rich plaque    Reactive depression    Mixed hyperlipidemia    Right-sided chest wall pain    Mucopurulent chronic bronchitis (HCC)    Olecranon bursitis of right elbow    Anxiety    Tobacco abuse    Benign essential hypertension    COPD exacerbation (HCC)    Acute on chronic respiratory failure with hypercapnia (HCC)    Macrocytic anemia       Inpatient Assessment  Care Transitions Summary    Care Transitions Inpatient Review  Medication Review  Do you have all of your prescriptions and are they filled?:  Yes   Are you able to afford your medications?:  No  How often do you have difficulty taking your medications?:  Sometimes I take them as prescribed.   Housing Review  Who do you live with?:  Other Caregiver  Are you an active caregiver in your home?:  No  Social Support  Do you have a ?:  No  Do you have a [address] [place] [address]?:  No  Durable Medical Equipment  Patient DME:  Shower chair  Patient Home Equipment:  Oxygen, Nebulizer  Functional Review  Ability to seek help/take action for Emergent/Urgent situations i.e. fire, crime, inclement weather or health crisis. :  Independent  Ability handle personal hygiene needs (bathing/dressing/grooming):  Needs Assistance  Ability to manage medications:  Needs Assistance  Ability to prepare food:  Needs Assistance  Ability to maintain home (clean home, laundry):  Dependent  Ability to drive and/or has transportation:  Independent  Ability to do shopping:  Needs Assistance  Is patient able to live independently?:  No  Hearing and Vision  Visual Impairment:  Visual impairment (Glasses/contacts)  Hearing Impairment:  Hard of hearing  Care Transitions Interventions     Met with patient to discuss care transition. Role of CTC and care transition program explained to patient and patient's sister Celestina José with permission from patient. Patient stated he resides in a private residence with his sister Celestina José. Patient stated Celestina José helps with most ADL's such as cleaning, medications management, shopping, and meals. Patient reported that he has been having issues with bathing and standing in the shower which causes him to have panic attacks. Patient reported he has a shower chair and recently has been sponge bathing. Patient reported he has issues with affording his medications and at times is cutting medications in half to stretch his medication longer or skipping doses. [facility] provided patient with medication resources for [facility] and Only-apartments meds. [facility] also discussed meds to bed and pharmacy consult to review meds for any additional cost savings. Patient agreeable to home care services and [facility] offered list of home care agencies and patient would like [facility] referral. [facility] notified [facility] liaison of referral. Patient is active with [facility] for home oxygen 2.5 liters. If meets criteria, patient agreeable to participate in care transition program post discharge. Encouraged patient to ask to speak with  on the unit should any needs arise. Notified JOSE JOHNSON Brianne Kaba RN of the above and CTC placed pharmacy consult to review medications and left sticky note to MD for meds to bed. CTC left VM for [facility] [place] regarding services. Follow Up  Future Appointments   Date Time Provider Joanne Kessler   7/8/2019  4:40 PM Katie Castano MD [facility] Maintenance  There are no preventive care reminders to display for this patient.     Pasquale Thompson RN, BSN, [facility] Transitions Coordinator    [phone]

## 2019-05-06 NOTE — PROGRESS NOTES
252 223     BMP:   Recent Labs     05/04/19  0825 05/05/19  0441 05/06/19  0957    133* 131*   K 4.1 4.4 4.2   CL 89* 87* 86*   CO2 44* 37* 37*   BUN 33* 36* 34*   CREATININE 0.9 0.9 0.8     LIVER PROFILE:   Recent Labs     05/04/19  0825   AST 17   ALT 14   BILITOT 0.4   ALKPHOS 80       Imaging:  I have reviewed radiology images personally. XR CHEST PORTABLE   Final Result   No acute process. Xr Chest Portable    Result Date: 5/4/2019  EXAMINATION: SINGLE XRAY VIEW OF THE CHEST 5/4/2019 9:04 am COMPARISON: April 18, 2018 HISTORY: ORDERING SYSTEM PROVIDED HISTORY: dyspnea, PNA concern TECHNOLOGIST PROVIDED HISTORY: Reason for exam:->dyspnea, PNA concern Ordering Physician Provided Reason for Exam: dyspnea, PNA concern Acuity: Acute Type of Exam: Initial FINDINGS: The lungs are without acute focal process. There is no effusion or pneumothorax. The cardiomediastinal silhouette is without acute process. The osseous structures are without acute process. No acute process. Xr Chest Portable    Result Date: 4/18/2019  EXAMINATION: SINGLE XRAY VIEW OF THE CHEST 4/18/2019 10:11 pm COMPARISON: 02/13/2019 HISTORY: ORDERING SYSTEM PROVIDED HISTORY: shortness of breath TECHNOLOGIST PROVIDED HISTORY: Reason for exam:->shortness of breath Ordering Physician Provided Reason for Exam: sob Acuity: Acute Type of Exam: Initial FINDINGS: No acute bony abnormality. The heart size and mediastinal contours are stable. The lungs are clear without focal consolidation, large effusion or overt edema. Stable portable study. Results for Marquis Last (MRN [id number]) as of 5/6/2019 11:14   Ref.  Range 5/5/2019 04:41 5/6/2019 09:57   Sodium Latest Ref Range: 136 - 145 mmol/L 133 (L) 131 (L)   Potassium Latest Ref Range: 3.5 - 5.1 mmol/L 4.4 4.2   Chloride Latest Ref Range: 99 - 110 mmol/L 87 (L) 86 (L)   CO2 Latest Ref Range: 21 - 32 mmol/L 37 (H) 37 (H)   BUN Latest Ref Range: 7 - 20 mg/dL 36 (H) 34 (H)   Creatinine Latest Ref Range: 0.8 - 1.3 mg/dL 0.9 0.8   Anion Gap Latest Ref Range: 3 - 16  9 8   GFR Non- Latest Ref Range: >60  >60 >60   GFR  Latest Ref Range: >60  >60 >60   Glucose Latest Ref Range: 70 - 99 mg/dL 118 (H) 129 (H)   Calcium Latest Ref Range: 8.3 - 10.6 mg/dL 9.2 9.2   Troponin Latest Ref Range: <0.01 ng/mL <0.01    WBC Latest Ref Range: 4.0 - 11.0 K/uL 9.6 9.8   RBC Latest Ref Range: 4.20 - 5.90 M/uL 3.13 (L) 3.18 (L)   Hemoglobin Quant Latest Ref Range: 13.5 - 17.5 g/dL 10.9 (L) 11.0 (L)   Hematocrit Latest Ref Range: 40.5 - 52.5 % 32.0 (L) 32.6 (L)   MCV Latest Ref Range: 80.0 - 100.0 fL 102.2 (H) 102.4 (H)   MCH Latest Ref Range: 26.0 - 34.0 pg 34.8 (H) 34.5 (H)   MCHC Latest Ref Range: 31.0 - 36.0 g/dL 34.1 33.7   MPV Latest Ref Range: 5.0 - 10.5 fL 8.5 8.6   RDW Latest Ref Range: 12.4 - 15.4 % 14.1 14.0   Platelet Count Latest Ref Range: 135 - 450 K/uL 252 223     Results for Roger Ramirez (MRN [id number]) as of 5/6/2019 11:14   Ref. Range 4/18/2019 22:05 5/4/2019 08:25 5/5/2019 04:41 5/6/2019 09:57   WBC Latest Ref Range: 4.0 - 11.0 K/uL 9.8 11.5 (H) 9.6 9.8   RBC Latest Ref Range: 4.20 - 5.90 M/uL 3.64 (L) 3.49 (L) 3.13 (L) 3.18 (L)   Hemoglobin Quant Latest Ref Range: 13.5 - 17.5 g/dL 12.2 (L) 12.0 (L) 10.9 (L) 11.0 (L)   Hematocrit Latest Ref Range: 40.5 - 52.5 % 37.3 (L) 35.8 (L) 32.0 (L) 32.6 (L)   MCV Latest Ref Range: 80.0 - 100.0 fL 102.6 (H) 102.6 (H) 102.2 (H) 102.4 (H)   MCH Latest Ref Range: 26.0 - 34.0 pg 33.7 34.4 (H) 34.8 (H) 34.5 (H)   MCHC Latest Ref Range: 31.0 - 36.0 g/dL 32.8 33.6 34.1 33.7   MPV Latest Ref Range: 5.0 - 10.5 fL 8.2 8.2 8.5 8.6   RDW Latest Ref Range: 12.4 - 15.4 % 13.9 14.2 14.1 14.0   Platelet Count Latest Ref Range: 135 - 450 K/uL 330 272 252 223   Neutrophils % Latest Units: % 68.4 67.7     Lymphocyte % Latest Units: % 20.5 22.6       Results for Roger Ramirez (MRN [id number]) as of 5/6/2019 11:14   Ref. Range 5/4/2019 08:25 5/4/2019 14:22   Hemoglobin, Art, Extended Latest Ref Range: 13.5 - 17.5 g/dL  11.7 (L)   pH, Arterial Latest Ref Range: 7.350 - 7.450   7.387   pCO2, Arterial Latest Ref Range: 35.0 - 45.0 mmHg  73.8 (HH)   pO2, Arterial Latest Ref Range: 75.0 - 108.0 mmHg  109.1 (H)   HCO3, Arterial Latest Ref Range: 21.0 - 29.0 mmol/L  43.4 (H)   TCO2 (calc), Art Latest Ref Range: Not Established mmol/L  45.6   Base Excess, Arterial Latest Ref Range: -3.0 - 3.0 mmol/L  15.3 (H)   O2 Sat, Arterial Latest Ref Range: >92 %  97.6   O2 Content, Arterial Latest Ref Range: Not Established mL/dL  16   Methemoglobin, Arterial Latest Ref Range: <1.5 %  0.7   Carboxyhgb, Arterial Latest Ref Range: 0.0 - 1.5 %  0.6   pH, Charanjit Latest Ref Range: 7.350 - 7.450  7.337 (L)    pCO2, Charanjit Latest Ref Range: 40.0 - 50.0 mmHg 92.5 (H)    pO2, Charanjit Latest Ref Range: 25.0 - 40.0 mmHg 38.2    HCO3, Venous Latest Ref Range: 23.0 - 29.0 mmol/L 48.4 (H)    TC02 (Calc), Charanjit Latest Ref Range: Not Established mmol/L 51    Base Excess, Charanjit Latest Ref Range: -3.0 - 3.0 mmol/L 18.0 (H)    O2 Content, Charanjit Latest Ref Range: Not Established VOL % 12    MetHgb, Charanjit Latest Ref Range: <1.5 % 0.3    O2 Sat, Charanjit Latest Ref Range: Not Established % 70      ECHO from care everywhere-Study Conclusions    - Left ventricle: The cavity size was normal. Wall thickness was    normal. Systolic function was normal. The calculated ejection    fraction was in the range of 64% to 68%. Normal diastolic    function.  - Aortic valve: Mean gradient (S): 8mm Hg. Peak gradient (S): 14mm    Hg. - Inferior vena cava: The vessel was normal in size; the    respirophasic diameter changes were in the normal range (>= 50%);    findings are consistent with normal central venous pressure.     Assessment:  Principal Problem:    COPD exacerbation (HCC)  Active Problems:    Chronic pain syndrome    Radiculopathy    Mixed hyperlipidemia    Tobacco abuse    Acute on chronic respiratory

## 2019-05-06 NOTE — PROGRESS NOTES
Intravenous Q12H    sodium chloride flush  10 mL Intravenous 2 times per day    enoxaparin  40 mg Subcutaneous Daily    pantoprazole  40 mg Oral QAM AC    busPIRone  20 mg Oral TID     PRN Meds: sodium chloride, LORazepam, oxyCODONE-acetaminophen, sodium chloride flush, magnesium hydroxide, acetaminophen, prochlorperazine      Intake/Output Summary (Last 24 hours) at 5/6/2019 1438  Last data filed at 5/6/2019 1154  Gross per 24 hour   Intake 660 ml   Output 1350 ml   Net -690 ml       Physical Exam Performed:  BP (!) 158/76   Pulse 107   Temp 97.5 °F (36.4 °C) (Oral)   Resp 16   Ht 5' 7\" (1.702 m)   Wt 173 lb 15.1 oz (78.9 kg)   SpO2 95%   BMI 27.24 kg/m²     General appearance: No apparent distress, appears stated age and cooperative. HEENT: Pupils equal, round, and reactive to light. Conjunctivae/corneas clear. Neck: Supple, with full range of motion. No jugular venous distention. Trachea midline. Respiratory:  No longer with increased WOB. Bilaterally without Rales/Rhonchi. Now with increased airflow and scattered expiratory wheezing. Cardiovascular: Regular rate and rhythm with normal S1/S2 without murmurs, rubs or gallops. Abdomen: Soft, non-tender, non-distended with normal bowel sounds. Musculoskeletal: No clubbing, cyanosis. Trace BLE pitting edema, improved. Full range of motion without deformity. Skin: Skin color, texture, turgor normal.  No rashes or lesions. Neurologic:  Neurovascularly intact without any focal sensory/motor deficits.  Cranial nerves: II-XII intact, grossly non-focal.  Psychiatric: Alert and oriented, thought content appropriate, normal insight  Capillary Refill: Brisk,< 3 seconds   Peripheral Pulses: +2 palpable, equal bilaterally       Labs:   Recent Labs     05/04/19  0825 05/05/19  0441 05/06/19  0957   WBC 11.5* 9.6 9.8   HGB 12.0* 10.9* 11.0*   HCT 35.8* 32.0* 32.6*    252 223     Recent Labs     05/04/19  0825 05/05/19  0441 05/06/19  0957    133* 131*   K 4.1 4.4 4.2   CL 89* 87* 86*   CO2 44* 37* 37*   BUN 33* 36* 34*   CREATININE 0.9 0.9 0.8   CALCIUM 9.2 9.2 9.2     Recent Labs     05/04/19  0825   AST 17   ALT 14   BILITOT 0.4   ALKPHOS 80     No results for input(s): INR in the last 72 hours. Recent Labs     05/04/19  0825 05/04/19  1552 05/05/19  0441   TROPONINI 0.02* <0.01 <0.01     Radiology:  XR CHEST PORTABLE   Final Result   No acute process. Active Hospital Problems    Diagnosis Date Noted    Acute on chronic respiratory failure with hypercapnia (HCC) [J96.22] 05/06/2019    Macrocytic anemia [D53.9] 05/06/2019    COPD exacerbation ([facility] [address] [J44.1] 05/04/2019    Tobacco abuse [Z72.0] 02/04/2019    Mixed hyperlipidemia [E78.2] 02/13/2017    Radiculopathy [M54.10]     Chronic pain syndrome [G89.4] 03/11/2013     Assessment/Plan:  AECOPD, with acute on chronic hypercapnic respiratory failure  - steroids, inhaled bronchodilators  - CXR clear, procalcitonin negative. No abx. Leukocytosis is probably from outpatient steroids. - required BiPAP in the ED  - wean to baseline [facility]  - pulmonary consulted      Anxiety  - PCP recently increased his buspirone, also on duloxetine. He would be high risk for chronic outpatient us of acutely sedating anxiolytics. Had to give lorazepam to get him to initially tolerate the BiPAP in the ED, avoid further use.      HTN  - dilt, losartan     Trop elevation  - likely demand ischemia. The patient denies chest pain. EKG is not suggestive of ischemia. No further workup anticipated.      HLD  - statin     Venous stasis edema  - clinically not CHF. Compression, elevation. He rarely takes his PRN furosemide, which seems appropriate.     Chronic pain  - he has been on opioids for decades. Continued here. DVT Prophylaxis: enoxaparin  Diet: DIET GENERAL;  Code Status: Limited    PT/OT Eval Status: eval ordered    Dispo - ready when respiratory status is stable, perhaps 5/7-8.   He lives at home with family.      Francisca Bradford MD

## 2019-05-06 NOTE — FLOWSHEET NOTE
05/06/19 0845   Vital Signs   Temp 98.2 °F (36.8 °C)   Temp Source Oral   Pulse 85   Heart Rate Source Monitor   Resp 16   /70   BP Location Left upper arm   MAP (mmHg) 87   Patient Position Semi fowlers   Level of Consciousness 0   MEWS Score 1   Patient Currently in Pain Yes   Oxygen Therapy   SpO2 94 %   O2 Device Nasal cannula   O2 Flow Rate (L/min) 3.5 L/min

## 2019-05-06 NOTE — PROGRESS NOTES
TRANSFER TO [address] [place] [address] Rd performed complete skin assessment on transfer. Assessment revealed scattered bruising to knees and bilateral upper extremities. Scattered tears on bilateral upper extremities. Red heels, blanching. Upon transferring patient to ordered level of care, patients medications were gathered from the following locations and given to receiving nurse during bedside report. Upon transferring patient to ordered level of care, patients medications were gathered from the following locations and given to receiving nurse during bedside report:      Lock Box in room :     [x] Yes   [] No   Pyxis Bin:       [x] Yes   [] No      Pyxis Refrigerator:      [x] Yes   [] No   Tube System:       [x] Yes   [] No   LDA's documented:  [x] Yes   [] No          The following paperwork was transferred with patient:    Blue medication book:       [x] Yes   [] No      12 hour chart check:       [x] Yes   [] No   Patient belongings:       [x] Yes   [] No      Continuous pulse ox:   [x] Yes   [] No      [] N/A      Tele monitor number 44 (sticker wearing off) assigned to patient and placed on patient prior to transfer. CMU Notified at Transfer: [x] Yes   [] No     Name of [address] [place] [address] Staff: Theta Ayad THOMAS notified via [employer]:   [x] Yes   [] No    Family notified of transfer:    [x] Yes   [] No    Spoke with:   At bedside

## 2019-05-06 NOTE — PROGRESS NOTES
Physical Therapy  Attempted to see patient for therapy. Pt currently resting in bed, reports not wanting to get up right now. \"I'm just feeling really good right now and don't want to do anything to mess that up. \" Pt requesting therapist return later. Will follow up as schedule permits.   Ana Paula Flair,   [facility]

## 2019-05-06 NOTE — CARE COORDINATION
[facility]  Referral received from [address] [facility] RN CTC with request for home care services. Writer has reviewed H&P and is aware the pt lives with his sister and has a documented hx of dementia. Writer has also noted the pt has a hx of COPD. Will assess if pt is appropriate for/ agreeable to [place] [address] Program for Management of COPD, pt may need assistance from family to participate in the Program.   I will continue to follow patient for needs, and arrange [place] [address] services prior to DC. Should pt dc over the weekend please fax facesheet, order, LOC, and H&P to [facility].      Reyes Spiegel RN CTN with Tanisha Jones [address]  XBI:[phone]

## 2019-05-06 NOTE — PLAN OF CARE
Problem: Pain:  Goal: Pain level will decrease  Description  Pain level will decrease  Outcome: Ongoing  Goal: Control of acute pain  Description  Control of acute pain  Outcome: Ongoing  Goal: Control of chronic pain  Description  Control of chronic pain  Outcome: Ongoing     Problem: Breathing Pattern - Ineffective:  Goal: Ability to achieve and maintain a regular respiratory rate will improve  Description  Ability to achieve and maintain a regular respiratory rate will improve  Outcome: Ongoing

## 2019-05-06 NOTE — FLOWSHEET NOTE
05/05/19 2002   Assessment   Charting Type Shift assessment   Neurological   Neuro (WDL) X   Level of Consciousness 0   Orientation Level Oriented X4  (can be confused at times)   Cognition Follows commands   Mirna Coma Scale   Eye Opening 4   Best Verbal Response 5   Best Motor Response 6   Mirna Coma Scale Score 15   HEENT   HEENT (WDL) X  (wears glasses)   Right Eye Impaired vision   Left Eye Impaired vision   Teeth Dentures upper   Respiratory   Respiratory (WDL) X   Respiratory Pattern Tachypneic  (at times)   Respiratory Depth Normal   Respiratory Quality/Effort Dyspnea at rest;Dyspnea with exertion  (labored at times, with exertion, intermittent at rest)   Chest Assessment Chest expansion symmetrical;Trachea midline   L Breath Sounds Diminished   R Breath Sounds Diminished   Breath Sounds   Right Upper Lobe Diminished   Right Middle Lobe Diminished   Right Lower Lobe Diminished   Left Upper Lobe Diminished   Left Lower Lobe Diminished   Cough/Sputum   Sputum How Obtained Spontaneous cough   Cough Moist;Non-productive   Cardiac   Cardiac (WDL) WDL   Cardiac Regularity Regular   Heart Sounds S1, S2   Cardiac Rhythm NSR   Rhythm Interpretation   Pulse 89   Cardiac Monitor   Telemetry Monitor On Yes   Telemetry Audible Yes   Telemetry Alarms Set Yes   Telemetry Box Number 47   Gastrointestinal   Abdominal (WDL) WDL   Peripheral Vascular   Peripheral Vascular (WDL) X   Edema Right lower extremity; Left lower extremity   RLE Edema +4;Pitting   LLE Edema +4;Pitting   Skin Color/Condition   Skin Color/Condition (WDL) WDL   Skin Integrity   Skin Integrity (WDL) X   Skin Integrity Bruising   Location scatteredn   Skin Integrity Site 2   Skin Integrity Location 2 Blister   Location 2 rt pinky toe   Skin Integrity Site 3   Skin Integrity Location 3 Abrasion    Location 3 l heel    Skin Integrity Site 4   Skin Integrity Location 4 Other (Comment)  (pink)   Location 4 coccyx   Skin Integrity Site 5   Skin Integrity Location 5 Redness   Location 5 BLE heels    Musculoskeletal   Musculoskeletal (WDL) X  (generalized weakness)   Genitourinary   Genitourinary (WDL) WDL   Flank Tenderness No   Suprapubic Tenderness No   Dysuria No   Anus/Rectum   Anus/Rectum (WDL) WDL   Psychosocial   Psychosocial (WDL) WDL

## 2019-05-06 NOTE — CARE COORDINATION
Initial case management assessment completed by Silke Cervantes RN Care Transitions nurse. Patient lives at home with his sister who is a retired nurse. He is currently on 2.5 liters of oxygen at home and his provider is Kyle. He also has a nebulizer and shower chair but reports that the only DME that patient has currently. He is open to home care and had no preference and was agreeable to [facility] per Valentina. Referral was made to [facility] for [facility] and she will follow hospital course.

## 2019-05-07 NOTE — PLAN OF CARE
Bed alarm activated, with wheels locked and bed in lowest position. Call light within reach, nonskid footwear on pt, 2/4 side rails in place. Pt verbalizes understanding of calling out to staff for assistance. Will continue to monitor.

## 2019-05-07 NOTE — CARE COORDINATION
Me    2019    Patient: Spring Burks Patient :    MRN: [id number]  Reason for Admission: COPD Exac  RARS: Readmission Risk Score: 22       nHpredict outcome report received and reviewed report with patient. Provided patient with copy of report. Addressed all questions/concerns.        Tamanna Arrington RN

## 2019-05-07 NOTE — PROGRESS NOTES
Physical Therapy  Facility/Department: [facility] C5 - MED SURG/ORTHO  Daily Treatment Note  NAME: Spring Burks  :   MRN: [id number]    Date of Service: 2019    Discharge Recommendations:  Home with assist PRN, Home with Home health PT   PT Equipment Recommendations  Equipment Needed: Yes  Mobility Devices: Sridhar Mate: Rolling    Patient Diagnosis(es): The primary encounter diagnosis was COPD exacerbation ([facility] [address] Diagnoses of Hypoxia and Troponin level elevated were also pertinent to this visit. has a past medical history of Amputation finger, Ankle fracture, left, Chronic nasal congestion, Chronic pain syndrome, Compression fracture of lumbar vertebra (HCC), COPD (chronic obstructive pulmonary disease) ([facility] [address] DDD (degenerative disc disease), Depression, Facet joint disease of lumbosacral region, Hyperglycemia, Hyperlipidemia, Insomnia, Radiculopathy, and Smoker's respiratory syndrome. has a past surgical history that includes hernia repair; amputation; and Colonoscopy (2016).     Restrictions  Restrictions/Precautions  Restrictions/Precautions: General Precautions, Fall Risk  Position Activity Restriction  Other position/activity restrictions: High fall risk, 3.5 L O 2, telemetry with continuous pulse Oximeter, Up with assist  Subjective   General  Chart Reviewed: Yes  Family / Caregiver Present: No  Referring Practitioner: Krista Cancino MD  Subjective  Subjective: Pt agreeable to PT   General Comment  Comments: Pt resting in bed upon arrival   Pain Screening  Patient Currently in Pain: Yes  Pain Assessment  Pain Assessment: Faces  Hernandez-Baker Pain Rating: Hurts a little bit  Vital Signs  Patient Currently in Pain: Yes       Orientation  Orientation  Overall Orientation Status: Within Functional Limits  Cognition      Objective   Bed mobility  Supine to Sit: Modified independent  Transfers  Sit to Stand: Stand by assistance;Contact guard assistance(cues for hand placement)  Bed to Chair:

## 2019-05-07 NOTE — PROGRESS NOTES
INPATIENT PULMONARY CRITICAL CARE PROGRESS NOTE      Reason for visit    acute exac of COPD        SUBJECTIVE:  Patient when seen was sleeping in the bed having no profound respiratory distress or any increased work of breathing, patient has been using BIPAP on intermittent basis as it causes his nose to get clogged , patient was on 3.5 L of oxygen when seen with saturation of 98%, patient is afebrile and hemodynamically maintained, patient has normal sinus rhythm on the monitor, patient's documented urine o/p is on lower side  patient's glycemic control is acceptable   no other pertinent review of system couldbe obtained because of patient's clinical status      Physical Exam:  Blood pressure 117/76, pulse 72, temperature 98 °F (36.7 °C), temperature source Oral, resp. rate 16, height 5' 7\" (1.702 m), weight 173 lb 15.1 oz (78.9 kg), SpO2 97 %.'   Constitutional:  No acute distress. HENT:  Oropharynx is clear and moist. No thyromegaly. Eyes:  Conjunctivae are normal. Pupils equal, round, and reactive to light. No scleral icterus. Neck: . No tracheal deviation present. No obvious thyroid mass. Cardiovascular: Normal rate, regular rhythm, normal heart sounds. No right ventricular heave. No lower extremity edema. Pulmonary/Chest: No wheezes. Scattered rales. Chest wall is not dull to percussion. No accessory muscle usage or stridor. Decreased breath on intensity  Abdominal: Soft. Bowel sounds present. No distension or hernia. No tenderness. Musculoskeletal: No cyanosis. No clubbing. finger amputation,No obvious joint deformity. Lymphadenopathy: No cervical or supraclavicular adenopathy. Skin: Skin is warm and dry. No rash or nodules on the exposed extremities.   Neurologic: Sleeping when seen        Results:  CBC:   Recent Labs     05/05/19 0441 05/06/19  0957   WBC 9.6 9.8   HGB 10.9* 11.0*   HCT 32.0* 32.6*   .2* 102.4*    223     BMP:   Recent Labs     05/05/19 0441 05/06/19  0957 * 131*   K 4.4 4.2   CL 87* 86*   CO2 37* 37*   BUN 36* 34*   CREATININE 0.9 0.8       Imaging:  I have reviewed radiology images personally. XR CHEST PORTABLE   Final Result   No acute process. Results for Sharon Last (MRN [id number]) as of 5/7/2019 17:10   Ref. Range 5/5/2019 04:41 5/6/2019 09:57   Sodium Latest Ref Range: 136 - 145 mmol/L 133 (L) 131 (L)   Potassium Latest Ref Range: 3.5 - 5.1 mmol/L 4.4 4.2   Chloride Latest Ref Range: 99 - 110 mmol/L 87 (L) 86 (L)   CO2 Latest Ref Range: 21 - 32 mmol/L 37 (H) 37 (H)   BUN Latest Ref Range: 7 - 20 mg/dL 36 (H) 34 (H)   Creatinine Latest Ref Range: 0.8 - 1.3 mg/dL 0.9 0.8   Anion Gap Latest Ref Range: 3 - 16  9 8   GFR Non- Latest Ref Range: >60  >60 >60   GFR  Latest Ref Range: >60  >60 >60   Glucose Latest Ref Range: 70 - 99 mg/dL 118 (H) 129 (H)   Calcium Latest Ref Range: 8.3 - 10.6 mg/dL 9.2 9.2   Troponin Latest Ref Range: <0.01 ng/mL <0.01    Theophylline Lvl Latest Ref Range: 8.0 - 20.0 ug/mL  3.7 (L)   WBC Latest Ref Range: 4.0 - 11.0 K/uL 9.6 9.8   RBC Latest Ref Range: 4.20 - 5.90 M/uL 3.13 (L) 3.18 (L)   Hemoglobin Quant Latest Ref Range: 13.5 - 17.5 g/dL 10.9 (L) 11.0 (L)   Hematocrit Latest Ref Range: 40.5 - 52.5 % 32.0 (L) 32.6 (L)   MCV Latest Ref Range: 80.0 - 100.0 fL 102.2 (H) 102.4 (H)   MCH Latest Ref Range: 26.0 - 34.0 pg 34.8 (H) 34.5 (H)   MCHC Latest Ref Range: 31.0 - 36.0 g/dL 34.1 33.7   MPV Latest Ref Range: 5.0 - 10.5 fL 8.5 8.6   RDW Latest Ref Range: 12.4 - 15.4 % 14.1 14.0   Platelet Count Latest Ref Range: 135 - 450 K/uL 252 223       ECHO from care everywhere-Study Conclusions    - Left ventricle: The cavity size was normal. Wall thickness was    normal. Systolic function was normal. The calculated ejection    fraction was in the range of 64% to 68%. Normal diastolic    function.  - Aortic valve: Mean gradient (S): 8mm Hg. Peak gradient (S): 14mm    Hg.   - Inferior vena

## 2019-05-07 NOTE — PROGRESS NOTES
05/06/19 2330   NIV Type   NIV Started Yes   Equipment Type v60   Mode BIPAP   Mask Type Full face mask   Mask Size Large   Settings/Measurements   Comfort Level Good   Using Accessory Muscles No   IPAP 12 cmH20   EPAP 6 cmH2O   Rate Ordered 10   Resp 21   SpO2 98   FiO2  40 %  (turned O2 down to .35)   Vt Exhaled 576 mL   Skin Protection for O2 Device Yes   Breath Sounds   Right Upper Lobe Diminished   Right Middle Lobe Diminished   Right Lower Lobe Diminished   Left Upper Lobe Diminished   Left Lower Lobe Diminished   Alarm Settings   Alarms On Y   Press Low Alarm 6 cmH2O   High Pressure Alarm 28 cmH2O   Delay Alarm 20 sec(s)   Resp Rate Low Alarm 6   High Respiratory Rate 40 br/min

## 2019-05-07 NOTE — CARE COORDINATION
[facility]    Phone message left for patient's sister-Ovi Echols, regarding homecare services and need for liaison to verify demographics. Will follow patient for homecare services at discharge. Electronically signed by Mari Giron LPN on 4/0/42 at 00:33 PM    *Addendum* Patient's sister returned call. Verified demographics. Electronically signed by Mari Giron LPN on 9/5/99 at 3:14 PM        [facility] [address] [facility]  [facility] Care Transition Nurse  [phone]

## 2019-05-07 NOTE — PLAN OF CARE
Problem: Falls - Risk of:  Goal: Will remain free from falls  Description  Will remain free from falls  5/7/2019 1829 by Nabila Perez RN  Outcome: Ongoing  Note:   Bed in lowest position, wheels locked, 2/4 side rails up, nonskid footwear on. Bed/ chair check alarm in place, call light within reach. Pt instructed to call out when needing assistance. Pt stated understanding. Nurse will continue to monitor. Problem: Pain:  Goal: Pain level will decrease  Description  Pain level will decrease  Outcome: Ongoing  Note:   Bed in lowest position, wheels locked, 2/4 side rails up, nonskid footwear on. Bed/ chair check alarm in place, call light within reach. Pt instructed to call out when needing assistance. Pt stated understanding. Nurse will continue to monitor.

## 2019-05-08 NOTE — PROGRESS NOTES
Speech Language Pathology  Facility/Department: [address] [place] [address] - [facility] SURG/ORTHO   BEDSIDE SWALLOW EVALUATION    NAME: Magdaleno Lefort  : [id number]  MRN: [id number]    ADMISSION DATE: 2019  ADMITTING DIAGNOSIS: has OA (osteoarthritis); Hyperglycemia; Chronic pain syndrome; Chronic nasal congestion; Amputation finger; Smoker's respiratory syndrome; DDD (degenerative disc disease); Ankle fracture, left; Compression fracture of lumbar vertebra; Facet joint disease of lumbosacral region; Radiculopathy; Seborrheic keratosis; Chronic rhinosinusitis; Chronic ankle pain; Back pain; Opiate analgesic contract exists; Primary insomnia; Degeneration of intervertebral disc of lumbar region; Chronic low back pain; Coronary artery disease due to lipid rich plaque; Reactive depression; Mixed hyperlipidemia; Right-sided chest wall pain; Mucopurulent chronic bronchitis ([address] Olecranon bursitis of right elbow; Anxiety; Tobacco abuse; Benign essential hypertension; COPD exacerbation ([address] Acute on chronic respiratory failure with hypercapnia ([address] and Macrocytic anemia on their problem list.  ONSET DATE: Pt admitted to [facility] on 19    Recent Chest Xray/CT of Chest (19): Impression   No acute process.           Date of Eval: 2019  Evaluating Therapist: Anahi Thakkar    Current Diet level:  Current Diet : Regular  Current Liquid Diet : Thin    Primary Complaint  Patient Complaint: Per pt's daughter, pt occasionally has coughing with thin liquids at home and \"nearly passes out\" at times. Pt endorses this sensation \"about every other month\"    Pain:  Pain Assessment: Pt denied    Reason for Referral  Magdaleno Lefort was referred for a bedside swallow evaluation to assess the efficiency of his swallow function, identify signs and symptoms of aspiration and make recommendations regarding safe dietary consistencies, effective compensatory strategies, and safe eating environment.     Impression  Dysphagia Diagnosis: Swallow Thin  Recommended Form of Meds: PO  Recommendations: Modified barium swallow study; Dysphagia treatment  Therapeutic Interventions: Diet tolerance monitoring;Patient/Family education    Compensatory Swallowing Strategies  Compensatory Swallowing Strategies: Small bites/sips;Upright as possible for all oral intake;Remain upright for 30-45 minutes after meals;Eat/Feed slowly    Treatment/Goals  Short-term Goals  Timeframe for Short-term Goals: 2 days (5/10/19)  Long-term Goals  Timeframe for Long-term Goals: 3 days (5/11/19)  Goal 1: The pt will tolerate safest and least restrictive diet without s/s of aspiration. Dysphagia Goals: The patient will tolerate recommended diet without observed clinical signs of aspiration; The patient/caregiver will demonstrate understanding of compensatory strategies for improved swallowing safety. ;The patient will tolerate instrumental swallowing procedure    General  Chart Reviewed: Yes  Comments: Pt admitted with COPD exacerbation, hypoxia. Pt has hx of COPD and dementia per chart. He is an active smoker. Behavior/Cognition: Alert; Cooperative;Pleasant mood  Respiratory Status: O2 via nasual cannula  O2 Device: Nasal cannula  Liters of Oxygen: 3 L  Communication Observation: Functional  Follows Directions: Simple  Dentition: Some missing teeth  Patient Positioning: Upright in bed  Baseline Vocal Quality: Normal  Volitional Cough: Strong  Prior Dysphagia History: No hx of dysphagia per chart review. Consistencies Administered: Reg solid;Puree; Thin - cup; Thin - straw; Ice Chips    Vision/Hearing  Vision  Vision: Impaired  Vision Exceptions: Wears glasses at all times  Hearing  Hearing: Exceptions to [facility]  Hearing Exceptions: Hard of hearing/hearing concerns    Oral Motor Deficits  Oral/Motor  Oral Motor: Within functional limits    Oral Phase Dysfunction  Oral phase of swallow grossly appears WNL. No oral phase deficits noted during evaluation.      Indicators of Pharyngeal Phase Dysfunction  Pharyngeal phase of swallow appears grossly WNL. No pharyngeal deficits noted during evaluation. Laryngeal elevation appears WFL based upon palpation of anterior neck during swallow. Vocal quality dry before and after po trials. Pharyngeal: Pt's O2 sats 95-97% before, during, and after PO trials. Prognosis  Prognosis  Prognosis for safe diet advancement: good  Barriers to reach goals: other (comment);age  Barriers/Prognosis Comment: Hx of COPD  Individuals consulted  Consulted and agree with results and recommendations: Patient; Family member;RN  Family member consulted: Several of pt's family members at bedside    Education  Patient Education: Pt educated on reason for referral, role of ST, assessment results and recommendations.    Patient Education Response: Verbalizes understanding  Safety Devices in place: Yes  Type of devices: Left in bed;Call light within reach;Nurse notified    Therapy Time  SLP Individual Minutes  Time In: 7928  Time Out: 0071  Minutes: 20 minutes; dysphagia marti Barrera M.S. [id number] [facility]  Speech-language pathologist  [phone]

## 2019-05-08 NOTE — PROGRESS NOTES
INPATIENT PULMONARY CRITICAL CARE PROGRESS NOTE      Reason for visit    acute exac of COPD        SUBJECTIVE:  Patient when seen was lying  in the bed having no profound respiratory distress or any increased work of breathing, patient had undergone overnight pulse oximetry;;patient was alert and oriented   , patient was on 3 L of oxygen when seen with saturation of 96%, patient is afebrile and hemodynamically maintained, patient has normal sinus rhythm on the monitor, patient's documented urine o/p is on lower side  patient's glycemic control is acceptable   no other pertinent review of system of concern       Physical Exam:  Blood pressure (!) 148/89, pulse 79, temperature 98.2 °F (36.8 °C), temperature source Oral, resp. rate 18, height 5' 7\" (1.702 m), weight 173 lb 15.1 oz (78.9 kg), SpO2 96 %.'   Constitutional:  No acute distress. HENT:  Oropharynx is clear and moist. No thyromegaly. Eyes:  Conjunctivae are normal. Pupils equal, round, and reactive to light. No scleral icterus. Neck: . No tracheal deviation present. No obvious thyroid mass. Cardiovascular: Normal rate, regular rhythm, normal heart sounds. No right ventricular heave. No lower extremity edema. Pulmonary/Chest: No wheezes. Scattered rales. Chest wall is not dull to percussion. No accessory muscle usage or stridor. Decreased breath on intensity  Abdominal: Soft. Bowel sounds present. No distension or hernia. No tenderness. Musculoskeletal: No cyanosis. No clubbing. finger amputation,No obvious joint deformity. Lymphadenopathy: No cervical or supraclavicular adenopathy. Skin: Skin is warm and dry. No rash or nodules on the exposed extremities. ecchymosis   Neurologic: Alert and communicative  when seen        Results:  CBC:   Recent Labs     05/06/19  0957   WBC 9.8   HGB 11.0*   HCT 32.6*   .4*        BMP:   Recent Labs     05/06/19  0957   *   K 4.2   CL 86*   CO2 37*   BUN 34*   CREATININE 0.8 Imaging:  I have reviewed radiology images personally. XR CHEST PORTABLE   Final Result   No acute process. Overnight pulse oximetry -No nocturnal hypoxemia of concern       ECHO from care everywhere-Study Conclusions    - Left ventricle: The cavity size was normal. Wall thickness was    normal. Systolic function was normal. The calculated ejection    fraction was in the range of 64% to 68%. Normal diastolic    function.  - Aortic valve: Mean gradient (S): 8mm Hg. Peak gradient (S): 14mm    Hg. - Inferior vena cava: The vessel was normal in size; the    respirophasic diameter changes were in the normal range (>= 50%);    findings are consistent with normal central venous pressure. Assessment:  Principal Problem:    COPD exacerbation (HCC)  Active Problems:    Chronic pain syndrome    Radiculopathy    Mixed hyperlipidemia    Tobacco abuse    Acute on chronic respiratory failure with hypercapnia (HCC)    Macrocytic anemia  Resolved Problems:    * No resolved hospital problems. *          Plan:   · Oxygen supplementation to keep saturation between 90-94% only  · Patient does not qulaify for home BIPAP from the hospital as per CMS guidelines  · Will do overnight pulse oximetry @ 3.5 lts/min of oxygen to see if patient qualifies from a BIPAP/NIV from the hospital  · Saline nasal spray   · Pulmonary toilet  · Bronchodilators  · No need for any Dulera with duoneb  · No need for any antibiotics  · PO prednisone   · Patient is on BuSpar/Cymbalta/OxyContin and also on Ativan on when necessary basis which can cause patient to have worsening hypoventilation and hypercarbia which can contributing to patient's symptoms symptomatology  · Antihypertensives as per IM  · PUD and DVT prophylaxis as per IM  · Evaluation and management of macrocytic anemia as per IM    Palliative care following the patient   Patient is a Limited code at this time      Case d/w nursing and case management     ? Discharge planning       Electronically signed by:  Hever Ascencio MD    5/8/2019    11:23 AM.

## 2019-05-08 NOTE — PROGRESS NOTES
Placed pt on overnight pulse ox study at approx 2245 and pt was wearing 3L nasal cannula. Will continue to monitor through the night.

## 2019-05-08 NOTE — PROGRESS NOTES
Hospitalist Progress Note      PCP: Jamar Jordan MD    Date of Admission: 5/4/2019    Chief Complaint: dyspnea    Hospital Course: Reviewed      Subjective:  Patient feeling better this AM . Seen with wife at bedside .    -  Still having some Chest tightness and on 3.5 L/min O2 requirement. Reports using 2-2.5 L/m at home. RN requested to wean oxygen as able to home dose. - Reviewed Pulmonology recs - Plan to continue BiPAP at nap time and will check Nocturnal Pulse Ox to evaluate for arranging home Bipap at d/c .  - Also recommended Palliative eval to discuss GOC . Medications:  Reviewed  Scheduled Medications    sodium chloride  1 spray Each Nare TID    [START ON 5/8/2019] predniSONE  40 mg Oral Daily    fluticasone  1 spray Each Nare Daily    nicotine  1 patch Transdermal Daily    aspirin  81 mg Oral Daily    atorvastatin  40 mg Oral Daily    diltiazem  120 mg Oral Daily    DULoxetine  60 mg Oral BID    ipratropium-albuterol  1 ampule Inhalation Q4H WA    losartan  100 mg Oral Daily    oxyCODONE  15 mg Oral BID    methylPREDNISolone  40 mg Intravenous Q12H    sodium chloride flush  10 mL Intravenous 2 times per day    enoxaparin  40 mg Subcutaneous Daily    pantoprazole  40 mg Oral QAM AC    busPIRone  20 mg Oral TID     PRN Meds: melatonin, sodium chloride, LORazepam, oxyCODONE-acetaminophen, sodium chloride flush, magnesium hydroxide, acetaminophen, prochlorperazine      Intake/Output Summary (Last 24 hours) at 5/7/2019 2031  Last data filed at 5/7/2019 1118  Gross per 24 hour   Intake --   Output 975 ml   Net -975 ml       Physical Exam Performed:  /76   Pulse 72   Temp 98 °F (36.7 °C) (Oral)   Resp 16   Ht 5' 7\" (1.702 m)   Wt 173 lb 15.1 oz (78.9 kg)   SpO2 97%   BMI 27.24 kg/m²     General appearance: No apparent distress, appears stated age and cooperative. HEENT: Pupils equal, round, and reactive to light. Conjunctivae/corneas clear.   Neck: Supple, with full range of motion. No jugular venous distention. Trachea midline. Respiratory:  No longer with increased WOB. Bilaterally without Rales/Rhonchi. Now with improved  airflow and scattered expiratory wheezing. Cardiovascular: Regular rate and rhythm with normal S1/S2 without murmurs, rubs or gallops. Abdomen: Soft, non-tender, non-distended with normal bowel sounds. Musculoskeletal: No clubbing, cyanosis. Trace BLE pitting edema, improved. Full range of motion without deformity. Skin: Skin color, texture, turgor normal.  No rashes or lesions. Neurologic:  Neurovascularly intact without any focal sensory/motor deficits. Cranial nerves: II-XII intact, grossly non-focal.  Psychiatric: Alert and oriented, thought content appropriate, normal insight  Capillary Refill: Brisk,< 3 seconds   Peripheral Pulses: +2 palpable, equal bilaterally       Labs:   Recent Labs     05/05/19 0441 05/06/19  0957   WBC 9.6 9.8   HGB 10.9* 11.0*   HCT 32.0* 32.6*    223     Recent Labs     05/05/19 0441 05/06/19  0957   * 131*   K 4.4 4.2   CL 87* 86*   CO2 37* 37*   BUN 36* 34*   CREATININE 0.9 0.8   CALCIUM 9.2 9.2       Recent Labs     05/05/19 0441   TROPONINI <0.01     Radiology:  XR CHEST PORTABLE   Final Result   No acute process. Active Hospital Problems    Diagnosis Date Noted    Acute on chronic respiratory failure with hypercapnia (HCC) [J96.22] 05/06/2019    Macrocytic anemia [D53.9] 05/06/2019    COPD exacerbation ([facility] [address] [J44.1] 05/04/2019    Tobacco abuse [Z72.0] 02/04/2019    Mixed hyperlipidemia [E78.2] 02/13/2017    Radiculopathy [M54.10]     Chronic pain syndrome [G89.4] 03/11/2013     Assessment/Plan:  AECOPD, with acute on chronic hypercapnic respiratory failure  - steroids, inhaled bronchodilators  - CXR clear, procalcitonin negative. No abx. Leukocytosis is probably from outpatient steroids.     - required BiPAP in the ED and ongoing with [facility]   - wean to baseline

## 2019-05-08 NOTE — PROCEDURES
INSTRUMENTAL SWALLOW REPORT  MODIFIED BARIUM SWALLOW    NAME: Eliane Wright   :   MRN: [id number]       Date of Eval: 2019     Ordering Physician: Dr. Corado   Radiologist: Dr. Maddie Johnston     Referring Diagnosis(es): Referring Diagnosis: Dysphagia    Past Medical History:  has a past medical history of Amputation finger, Ankle fracture, left, Chronic nasal congestion, Chronic pain syndrome, Compression fracture of lumbar vertebra ([address] COPD (chronic obstructive pulmonary disease) ([address] DDD (degenerative disc disease), Depression, Facet joint disease of lumbosacral region, Hyperglycemia, Hyperlipidemia, Insomnia, Radiculopathy, and Smoker's respiratory syndrome. Past Surgical History:  has a past surgical history that includes hernia repair; amputation; and Colonoscopy (2016). Current Diet Solid Consistency: Regular  Current Diet Liquid Consistency: Thin    Date of Prior Study: n/a  Type of Study: Initial MBS  Results of Prior Study: n/a    Recent CXR/CT of Chest (19): FINDINGS:   The lungs are without acute focal process.  There is no effusion or   pneumothorax. The cardiomediastinal silhouette is without acute process. The   osseous structures are without acute process. Pain   Patient Currently in Pain: Denies    Patient Complaints/Reason for Referral:  Eliane Wright was referred for a MBS to assess the efficiency of his/her swallow function, assess for aspiration, and to make recommendations regarding safe dietary consistencies, effective compensatory strategies, and safe eating environment. Patient complaints: No hx of dysphagia per chart review, however, per pt's daughter, pt occasionally has coughing with thin liquids at home and \"nearly passes out\" at times. Pt endorses this sensation \"about every other month\". General Comment:   Pt admitted with COPD exacerbation, hypoxia. Pt has hx of COPD and dementia per chart. He is an active smoker.        Recommended Diet:  Solid consistency: Regular  Liquid consistency: Thin(Avoid straws; strict use of chin tuck)  Liquid administration via: Cup(*Small, single sips)  Medication administration: Meds in puree   · Pt would benefit from continued dysphagia tx after discharge as well as continued cueing for small, single sips, avoiding straws, and strict use of chin tuck when drinking. Impressions:  Treatment Dx: Dysphagia  Pt demonstrates mild-moderate oropharyngeal dysphagia  · Pt's oral deficits characterized by weak lingual manipulation and reduced bolus control with premature bolus loss to the pharynx with all PO trials. · Pt's pharyngeal deficits characterized by reduced laryngeal elevation and reduced airway/laryngeal closure with intermittent shallow penetration before and during the swallow with thin liquid trials via cup and head in neutral position (occasionally completely self-clearing). No cough reflex. No aspiration. With pt effectively using chin tuck strategy with further thin liquid trials, no penetration / aspiration observed. Nectar-thick liquid trials via cup and head neutral resulted in continued shallow, partially self-clearing penetration. No cough reflex. No aspiration. No significant pharyngeal residue noted post-swallow with any consistencies trialed. · Pt would benefit from continued dysphagia tx after discharge as well as continued cueing for small, single sips, avoid straws, and strict use of chin tuck when drinking. Oral Preparation / Oral Phase  Impaired  Oral Phase - Major Contributing Deficits  · Weak Lingual Manipulation: All  · Reduced Posterior Propulsion: Reg solid  · Reduced Bolus Control: All  · Premature Bolus Loss to Pharynx: All    Pharyngeal Phase  Impaired  Pharyngeal Phase - Major Contributing Deficits  · Premature Spillage to Valleculae: All  · Reduced Laryngeal Elevation: All  · Reduced Airway/laryngeal Closure: All  · Shallow Penetration Before: Thin cup; Thin teaspoon  · Shallow Penetration During: Thin cup;Nectar cup  · Complete Self-clearing (shallow): Thin teaspoon; Thin cup  · Parital Self-clearing (shallow): Nectar cup; Thin cup  · Deep Penetration During: Thin cup(x1)  · No Cough Reflex: Nectar cup; Thin teaspoon; Thin cup  · Pharyngeal Phase Comment: Pt independently took consecutive sips during study and would benefit from cueing to take small, single sips when drinking. Esophageal Phase  Appears WFL when viewed at the cervical level throughout the duration of the study       Patient Position: Lateral and Patient Degrees: Pt seated upright in [facility] chair    Consistencies Administered: Reg solid;Puree; Thin teaspoon; Thin cup;Nectar cup    Compensatory Swallowing Strategies Attempted: Chin tuck  Postural Changes and/or Swallow Maneuvers Trialed: Cornell moore    Dysphagia Outcome Severity Scale: Level 4: Mild moderate dysphagia- Intermittent supervision/cueing. One - two diet consistencies restricted  Penetration-Aspiration Scale (PAS): 5 - Material enters the airway, contacts the vocal folds, and is not ejected into the airway    Safe Swallow Protocol:  Supervision: Distant  Compensatory Swallowing Strategies: Small bites/sips;Upright as possible for all oral intake;Remain upright for 30-45 minutes after meals;Eat/Feed slowly; No straws; Chin tuck    Behavior/Cognition/Vision/Hearing:  Behavior/Cognition: Alert; Cooperative;Pleasant mood  Vision: Impaired  Vision Exceptions: Wears glasses at all times  Hearing: Exceptions to [facility]  Hearing Exceptions: Hard of hearing/hearing concerns    Recommendations/Treatment  Requires SLP Intervention: Yes  D/C Recommendations: To be determined  Postural Changes and/or Swallow Maneuvers: Chin oscar    Recommended Exercises:    Therapeutic Interventions: Diet tolerance monitoring;Patient/Family education;Pharyngeal exercises; Laryngeal exercises    Education: Images and recommendations were reviewed with pt and RN via phone following this exam.   Patient Education: Pt educated on MBSS procedure, nature of oropharyngeal deficits, assessment results and recommendations. Patient Education Response: Verbalizes understanding;Needs reinforcement    Prognosis  Prognosis for safe diet advancement: good  Barriers to reach goals: other (comment);age  Barriers/Prognosis Comment: Hx of COPD  Duration/Frequency of Treatment  Duration/Frequency of Treatment: 3-5x/week for LOS  Safety Devices  Safety Devices in place: Yes  Type of devices: (Pt left MBSS in no distress; left with transport)    Goals:    Long Term:   Timeframe for Long-term Goals: 3 days (5/11/19)  Goal 1: The pt will tolerate safest and least restrictive diet without s/s of aspiration. Short Term:  1) The patient will tolerate recommended diet without observed clinical signs of aspiration  2) The patient/caregiver will demonstrate understanding of compensatory strategies for improved swallowing safety. 3) The patient will tolerate instrumental swallowing procedure  4) The patient will tolerate thin liquids without signs and symptoms of aspiration 10/10 via cup.       Therapy Time:   Individual Concurrent Group Co-treatment   Time In 1350         Time Out 1420         Minutes 30            30 minutes; MBSS procedure and dysphagia tx    Cary Jay M.S. [facility]  Speech-language pathologist  [id number]

## 2019-05-08 NOTE — DISCHARGE INSTR - COC
M54.10    Seborrheic keratosis L82.1    Chronic rhinosinusitis J32.9    Chronic ankle pain M25.579, G89.29    Back pain M54.9    Opiate analgesic contract exists Z02.89    Primary insomnia F51.01    Degeneration of intervertebral disc of lumbar region M51.36    Chronic low back pain M54.5, G89.29    Coronary artery disease due to lipid rich plaque I25.10, I25.83    Reactive depression F32.9    Mixed hyperlipidemia E78.2    Right-sided chest wall pain R07.89    Mucopurulent chronic bronchitis (HCC) J41.1    Olecranon bursitis of right elbow M70.21    Anxiety F41.9    Tobacco abuse Z72.0    Benign essential hypertension I10    COPD exacerbation ([facility]) J44.1    Acute on chronic respiratory failure with hypercapnia ([facility]) J96.22    Macrocytic anemia D53.9       Isolation/Infection:   Isolation          No Isolation            Nurse Assessment:  Last Vital Signs: BP (!) 148/89   Pulse 79   Temp 98.2 °F (36.8 °C) (Oral)   Resp 18   Ht 5' 7\" (1.702 m)   Wt 173 lb 15.1 oz (78.9 kg)   SpO2 96%   BMI 27.24 kg/m²     Last documented pain score (0-10 scale): Pain Level: 5  Last Weight:   Wt Readings from Last 1 Encounters:   05/06/19 173 lb 15.1 oz (78.9 kg)     Mental Status:  oriented and alert    IV Access:  - None    Nursing Mobility/ADLs:  Walking   Assisted  Transfer  Assisted  Bathing  Assisted  Dressing  Assisted  Toileting  Assisted  Feeding  Independent  Med Admin  Independent  Med Delivery   whole    Wound Care Documentation and Therapy:        Elimination:  Continence:   · Bowel: Yes  · Bladder: Yes  Urinary Catheter: None   Colostomy/Ileostomy/Ileal Conduit: Yes       Date of Last BM: ***    Intake/Output Summary (Last 24 hours) at 5/8/2019 1439  Last data filed at 5/8/2019 1119  Gross per 24 hour   Intake --   Output 375 ml   Net -375 ml     I/O last 3 completed shifts:  In: -   Out: 800 [Urine:800]    Safety Concerns:      At Risk for Falls    Impairments/Disabilities: None    Nutrition Therapy:  Current Nutrition Therapy:   - Oral Diet:  General    Routes of Feeding: Oral  Liquids: Thin Liquids  Daily Fluid Restriction: no  Last Modified Barium Swallow with Video (Video Swallowing Test): done on 05/8/19/***    Treatments at the Time of Hospital Discharge:   Respiratory Treatments: ***  Oxygen Therapy:  is on oxygen at 3 L/min per nasal cannula. Ventilator:    - No ventilator support    Rehab Therapies: NURSE, PT, OT, SLP  Weight Bearing Status/Restrictions: No weight bearing restirctions  Other Medical Equipment (for information only, NOT a DME order):  ***  Other Treatments: ***    Patient's personal belongings (please select all that are sent with patient):  Glasses    RN SIGNATURE:  Electronically signed by Farida Wilburn RN on 5/8/19 at 6:29 PM    CASE MANAGEMENT/SOCIAL WORK SECTION    Inpatient Status Date: ***    Readmission Risk Assessment Score:  Readmission Risk              Risk of Unplanned Readmission:        22           Discharging to Facility/ Agency   Name:  [facility] care    Address: [address] [place] [address], [address] [place] [address]  Phone: [phone]  Fax: [phone]    ·     / signature: {Esignature:[id number]}    PHYSICIAN SECTION    Prognosis: Guarded    Condition at Discharge: Stable    Rehab Potential (if transferring to Rehab): Guarded    Recommended Labs or Other Treatments After Discharge:     Physician Certification: I certify the above information and transfer of Migdalia Rizo  is necessary for the continuing treatment of the diagnosis listed and that he requires [address] for less 30 days.      Update Admission H&P: No change in H&P    PHYSICIAN SIGNATURE:  Electronically signed by Ashley Zapata MD on 5/8/19 at 3:14 PM

## 2019-05-08 NOTE — PLAN OF CARE
Problem: Nutrition  Intervention: Swallowing evaluation  Note:   Bedside swallow evaluation completed this date. Matt William M.S. [id number] [facility]  Speech-language pathologist  [id number]      Intervention: Aspiration precautions  Note:   Bedside swallow evaluation completed this date.     Matt William M.S. [address] [facility]  Speech-language pathologist  [id number]

## 2019-05-08 NOTE — PROGRESS NOTES
guard assistance  Stand to sit: Stand by assistance;Contact guard assistance  Ambulation  Ambulation?: Yes  Ambulation 1  Surface: level tile  Device: Rolling Walker  Other Apparatus: O2(3L)  Assistance: Stand by assistance  Quality of Gait: decreased cheryl, steady, w/o LOB  Distance: 20 ft, 150 ft   Stairs/Curb  Stairs?: No     Balance  Posture: Fair  Sitting - Static: Good  Sitting - Dynamic: Good  Standing - Static: Good  Standing - Dynamic: Fair;+  Exercises  Comments: Unable to perform ther ex as pt was transported to [facility] for a procedure. Assessment   Body structures, Functions, Activity limitations: Decreased functional mobility ; Decreased balance;Decreased strength;Decreased endurance  Assessment: Pt requiring SBA to complete ambulation with rw. Supervision on stairs yesterday.    Treatment Diagnosis: decreased activity tolerance  Prognosis: Good  Patient Education: role of PT, acute goals, PLB, safety with mobility  REQUIRES PT FOLLOW UP: Yes  Activity Tolerance  Activity Tolerance: Patient Tolerated treatment well     AM-PAC Score  AM-PAC Inpatient Mobility Raw Score : 20  AM-PAC Inpatient T-Scale Score : 47.67  Mobility Inpatient CMS 0-100% Score: 35.83  Mobility Inpatient CMS G-Code Modifier : CJ     Goals  Short term goals  Time Frame for Short term goals: 5 days   Short term goal 1: Pt to be I with transfers  Short term goal 2: Pt to ambulate 200 ft (I) with O2 sats>90%  Short term goal 3: Pt to ascend/descend 4 steps with bilat HR at mod (I)  Short term goal 4: Pt to tolerate X10-15 reps of BLE ex to improve strength and endurance  Patient Goals   Patient goals : go home    Plan    Plan  Times per week: 3-5X/week  Times per day: Daily  Current Treatment Recommendations: Strengthening, Gait Training, ROM, Balance Training, Stair training, Functional Mobility Training, Endurance Training, Safety Education & Training, Transfer Training  Safety Devices  Type of devices: Left in chair, Nurse notified, Gait belt(Pt was taken by transport for a procedure at end of session.)     Therapy Time   Individual Concurrent Group Co-treatment   Time In [address]         Time Out 1340         Minutes [address] [place] [address] [facility]

## 2019-05-08 NOTE — DISCHARGE SUMMARY
Hospital Medicine Discharge Summary    Patient ID: Gregorio Falk      Patient's PCP: Blayne Fischer MD    Admit Date: 5/4/2019     Discharge Date:  05/08/19    Admitting Physician: Jessica Toledo MD     Discharge Physician: Hallie Zarate MD     Discharge Diagnoses: Active Hospital Problems    Diagnosis Date Noted    Acute on chronic respiratory failure with hypercapnia (HCC) [J96.22] 05/06/2019    Macrocytic anemia [D53.9] 05/06/2019    COPD exacerbation ([address] [J44.1] 05/04/2019    Tobacco abuse [Z72.0] 02/04/2019    Mixed hyperlipidemia [E78.2] 02/13/2017    Radiculopathy [M54.10]     Chronic pain syndrome [G89.4] 03/11/2013       The patient was seen and examined on day of discharge and this discharge summary is in conjunction with any daily progress note from day of discharge. HPI taken from admission H&P :  The patient is a 75 Y M with a h/o COPD on 2L NC, HLD, and dementia. He lives at home with his sister, who is in fairly good health and takes care of him. His daughter accompanied him to the ED and says that for the last few months he has had a progressive decline. He has become more forgetful and less aware of what is going on around him, has become generally anxious, and is less compliant with his medications. His baseline respiratory status seems to have also deteriorated over the last few months. It is normal for him to feel dyspneic and have increased work of breathing. The daughter says that for months now he has often phoned her in a panic complaining of shortness of breath, saying that he is going to die. For the last few days his dyspnea seems to have acutely worsened, and he is working even harder than normal to breath, so she brought him to the ED. He got evaluated in ED and admitted to hospital for further E/M as follows     Hospital Course: By Problem List   AECOPD, with acute on chronic hypercapnic respiratory failure  - improved with steroids, inhaled bronchodilators  - CXR clear, procalcitonin negative.  No abx.  Leukocytosis is probably from outpatient steroids.    - required BiPAP in the ED and ongoing with Naps   - weaned  to baseline [facility]  - pulmonary followed and assisted  with care      Anxiety  - PCP recently increased his buspirone, also on duloxetine.  He would be high risk for chronic outpatient use of acutely sedating anxiolytics.  Had to give lorazepam to get him to initially tolerate the BiPAP in the ED, avoid further use.      HTN  - dilt, losartan     Trop elevation  - likely demand ischemia.  The patient denies chest pain.  EKG is not suggestive of ischemia.  No further workup anticipated.      HLD  - statin     Venous stasis edema  - clinically not CHF.  Compression, elevation.  He rarely takes his PRN furosemide, which seems appropriate.     Chronic pain  - he has been on opioids for decades.  Continued here.      Patient discharged home with home care  in stable medical condition     Physical Exam Performed:   /82   Pulse 77   Temp 98.1 °F (36.7 °C) (Oral)   Resp 18   Ht 5' 7\" (1.702 m)   Wt 173 lb 15.1 oz (78.9 kg)   SpO2 95%   BMI 27.24 kg/m²     General appearance: No apparent distress, appears stated age and cooperative. HEENT: Pupils equal, round, and reactive to light. Conjunctivae/corneas clear. Neck: Supple, with full range of motion. No jugular venous distention. Trachea midline. Respiratory:  No longer with increased WOB. Bilaterally without Rales/Rhonchi. Now with improved  airflow and scattered expiratory wheezing. Cardiovascular: Regular rate and rhythm with normal S1/S2 without murmurs, rubs or gallops. Abdomen: Soft, non-tender, non-distended with normal bowel sounds. Musculoskeletal: No clubbing, cyanosis. Trace BLE pitting edema, improved. Full range of motion without deformity. Skin: Skin color, texture, turgor normal.  No rashes or lesions.   Neurologic:  Neurovascularly intact without any focal sensory/motor deficits. Cranial nerves: II-XII intact, grossly non-focal.  Psychiatric: Alert and oriented, thought content appropriate, normal insight  Capillary Refill: Brisk,< 3 seconds   Peripheral Pulses: +2 palpable, equal bilaterally     Labs: For convenience and continuity at follow-up the following most recent labs are provided:      CBC:    Lab Results   Component Value Date    WBC 9.8 05/06/2019    HGB 11.0 05/06/2019    HCT 32.6 05/06/2019     05/06/2019       Renal:    Lab Results   Component Value Date     05/06/2019    K 4.2 05/06/2019    CL 86 05/06/2019    CO2 37 05/06/2019    BUN 34 05/06/2019    CREATININE 0.8 05/06/2019    CALCIUM 9.2 05/06/2019     Significant Diagnostic Studies    Radiology:   FL MODIFIED BARIUM SWALLOW W VIDEO   Final Result   Laryngeal penetration with thin and nectar but no evidence of tracheal   aspiration. Please see separate speech pathology report for full discussion of findings   and recommendations. XR CHEST PORTABLE   Final Result   No acute process. Consults:   IP CONSULT TO HOSPITALIST  IP CONSULT TO PULMONOLOGY  IP CONSULT TO PHARMACY  IP CONSULT TO PALLIATIVE CARE  IP CONSULT TO HOME CARE NEEDS    Disposition: Home with home care     Condition at Discharge: Stable    Discharge Instructions/Follow-up:   Follow-up With Details Why Contact Info   Candi Nguyen MD Schedule an appointment as soon as possible for a visit in 1 week Hospital discharge f/up  [facility].  [facility] [address] [place]   Raza Law MD Call in 2 days To schedule Hospital discharge f/up  [address] [address] [address] [facility]  [phone]     Code Status:  Limited     Activity: activity as tolerated    Diet: regular diet with Clorinda Phalen and NO STRAWS       Discharge Medications:   Current Discharge Medication List           Details predniSONE (DELTASONE) 20 MG tablet Take 2 tablets by mouth daily for 3 days  Qty: 6 tablet, Refills: 0      nicotine (NICODERM CQ) 21 MG/24HR Place 1 patch onto the skin daily  Qty: 30 patch, Refills: 3              Details   SYMBICORT 160-4.5 MCG/ACT AERO INHALE 2 PUFFS BY MOUTH 2 TIMES A DAY  Qty: 10.2 g, Refills: 11    Associated Diagnoses: Simple chronic bronchitis (HCC)      furosemide (LASIX) 40 MG tablet 1 tab daily as needed for swelling  Qty: 30 tablet, Refills: 1    Associated Diagnoses: Peripheral edema      potassium chloride (KLOR-CON M) 20 MEQ extended release tablet Take 1 tablet by mouth daily  Qty: 30 tablet, Refills: 1    Associated Diagnoses: Peripheral edema      fluticasone (FLONASE) 50 MCG/ACT nasal spray USE 2 SPRAYS IN EACH NOSTRIL DAILY  Qty: 16 g, Refills: 11    Associated Diagnoses: Chronic pansinusitis; Chronic nasal congestion      busPIRone (BUSPAR) 10 MG tablet Take 1 tablet by mouth 3 times daily  Qty: 30 tablet, Refills: 2    Associated Diagnoses: Reactive depression; Anxiety      aspirin 81 MG tablet Take 81 mg by mouth daily    Associated Diagnoses: Coronary artery disease due to lipid rich plaque      diltiazem (TIAZAC) 120 MG extended release capsule Take 120 mg by mouth daily    Associated Diagnoses: Benign essential hypertension      PROAIR  (90 Base) MCG/ACT inhaler INHALE 2 PUFFS BY MOUTH 4 TIMES DAILY WHEN AWAY FROM HOME  Qty: 8.5 g, Refills: 11    Associated Diagnoses: Smoker's respiratory syndrome      atorvastatin (LIPITOR) 40 MG tablet TAKE 1 TABLET BY MOUTH DAILY  Qty: 30 tablet, Refills: 11    Associated Diagnoses: Mixed hyperlipidemia      DULoxetine (CYMBALTA) 60 MG extended release capsule Take 1 capsule by mouth 2 times daily  Qty: 60 capsule, Refills: 11    Associated Diagnoses: Compression fracture of lumbar vertebra, sequela; Post-traumatic osteoarthritis of multiple joints; Chronic pain syndrome; Facet joint disease of lumbosacral region;  Reactive depression; Lumbosacral radiculopathy; Degeneration of intervertebral disc of lumbar region; Chronic midline low back pain with bilateral sciatica; Primary insomnia      vitamin D (CHOLECALCIFEROL) 42956 UNIT CAPS Take 1 capsule by mouth once a week  Qty: 4 capsule, Refills: 11    Associated Diagnoses: Vitamin D deficiency      aclidinium (TUDORZA PRESSAIR) 400 MCG/ACT AEPB inhaler Inhale 1 puff into the lungs 2 times daily  Qty: 1 each, Refills: 11    Associated Diagnoses: Mucopurulent chronic bronchitis (HCC)      oxyCODONE-acetaminophen (PERCOCET)  MG per tablet Take by mouth . Associated Diagnoses: Closed compression fracture of third lumbar vertebra, sequela; Chronic pain syndrome      OXYCONTIN 15 MG T12A extended release tablet Refills: 0    Associated Diagnoses: Degeneration of intervertebral disc of lumbar region; Chronic low back pain, unspecified back pain laterality, with sciatica presence unspecified      ipratropium-albuterol (DUONEB) 0.5-2.5 (3) MG/3ML SOLN nebulizer solution Use qid  Qty: 120 vial, Refills: 11    Associated Diagnoses: Mucopurulent chronic bronchitis (HCC)      Respiratory Therapy Supplies (NEBULIZER COMPRESSOR) KIT 1 kit by Does not apply route once for 1 dose  Qty: 1 kit, Refills: 0    Associated Diagnoses: Right-sided chest wall pain             Time Spent on discharge is more than 30 minutes in the examination, evaluation, counseling and review of medications and discharge plan. Signed:    Goyo Cohn MD   5/8/2019      Thank you Abiola Carranza MD for the opportunity to be involved in this patient's care. If you have any questions or concerns please feel free to contact me at [phone].

## 2019-05-08 NOTE — PROGRESS NOTES
Patient and family given discharge instructions. All questions and concerns were addressed. Patient wheeled to car with all patient belongings and oxygen tank.

## 2019-05-08 NOTE — CONSULTS
Inpatient consult to Palliative Care  Consult performed by: Omar Bray RN  Consult ordered by: Chayo Jordan MD  Reason for consult: [address]            Palliative Care Initial Note  Palliative Care Admit date:  19    Advance Directives:  Pt has not executed a DPOA-HC (he did complete a Living Will). He wishes to designate his dtr, Cora Yanes, as his healthcare proxy and Enoch Thakkar is in agreement. Pt has amended his code status to reflect DNR/DNI. Plan of care/goals:  Extensive d/w pt, his dtr and his friend, Sudha Rizvi, both of whom help pt to some degree in the home. He reports being much more limited in his activity 2/2 ARNOLD (it improves once he sits back down), though he can become SOB @ rest.  Pt and dtr agree in their assessment that pts readmissions seem to provide him benefit but, that benefit is short-lived. He is disappointed that he does not qualify for home bipap \"because that thing really feels like it makes my breathing easier. \"      Pt/family amenable to discussion and education around the progressing nature of his COPD, his propensity for hypercapnia and the concern of taking sedating medications. Pts dtr had broached the eventual option for hospice w/ pt though he is not seemingly receptive to that level of care at this point. Apparently, his spouse  shortly after admitting to hospice. Pt states he is of the hope to minimize his admissions and \"be as good as possible;\" he is not ready to shift the focus to comfort (at least not in our discussion today). While in room w/ pt/dtr, he coughed after taking drink of coffee and he and dtr admit that is a common occurrence stating \"I have coughed hard enough that I passed out. \"  D/w hospitalist & Pt is agreeable to having SLP marti.  D/w SLP    Social/Spiritual:  Mr. Stepan Trammell and his sister live tot[place] and he reports \"she isn't in good shape either\" and isn't able to be of much help to pt. Plan:   Will provide [facility]  [place]NCAMACHO paperwork given pts wish to amend code status;  will assist w/ DPOA-HC paperwork today; SLP will eval swallowing today; pt planning f/u w/ Dr. Lashae Nunez and office number provided; [id number] [employer]. [facility] number provided as dtr wanting to discuss home assistance/homemaking for pt and his sister; pt will be followed by [facility] and recommend palliative care w/ [facility], as well. ADDENDUM 1430:  Provided pt w/ a signed [place] DNR for home use. He and friend understand that need to keep it available in the home in order to honor pts wish for DNR/DNI.         Reason for consult:    _X_ Advance Care Planning  ___ Transition of Care Planning  ___ Psychosocial/Spiritual Support  ___ Symptom Management                                                                          Darryl Escobar RN

## 2019-05-09 NOTE — TELEPHONE ENCOUNTER
CLINICAL PHARMACY NOTE  Post-Discharge Transitions of Care (CHERY)    Non-face-to-face services provided:  Assessment and support for treatment adherence and medication management-medication reconciliation    Dr. Estrella Mahan,     Patient was recently discharged from [facility]. Patient stopped taking duloxetine and diltiazem abruptly about 2-3 weeks ago, which likely contributed to his recent increase in anxiety and may have contributed to his recent hospitalization. Provided extensive education of the importance of taking his medications as prescribed - and instructed pt to resume these medications (he was receiving both of these medications while inpatient). Duloxetine discontinuation syndrome can be very serious and this medication needs to be tapered off over > 4 weeks. Additionally, he needs to make an appointment with your office as he missed his pain management appt yesterday due to being in the hospital. Pain management does not have an appt until June and patient has 4 days left of his chronic pain medications. Thank you,   Pedro Granados, PharmD, [address] [facility]  Direct: [phone]  Department, toll free [phone], option 7    ============================================  Subjective/Objective:  Nina Delgado is a 75 y.o. male. Patient was discharged from [facility] on 5/8/19 with a diagnosis of COPD exacerbation. Patient outreach to review discharge medications and provide medication review and management. Spoke with sister    No Known Allergies    Discharge Medications (as per discharging medication list): There are NEW medications for you. START taking them after you leave the hospital   predniSONE (DELTASONE) 20 MG tablet  · Taking, no issues to report. Instructed sister to administer in the morning. States she gave it at 9 am today. Take 2 tablets by mouth daily for 3 days    nicotine (NICODERM CQ) 21 MG/24HR  · Taking, no issues to report. Instructed sister to rotate sites daily - do not smoke while wearing. Place 1 patch onto the skin daily     These are medications you told us you were taking at home, CONTINUE taking them after you leave the hospital   Medication Sig    SYMBICORT 160-4.5 MCG/ACT AERO INHALE 2 PUFFS BY MOUTH 2 TIMES A DAY    furosemide (LASIX) 40 MG tablet 1 tab daily as needed for swelling    potassium chloride (KLOR-CON M) 20 MEQ extended release tablet  · Only takes when he takes furosemide. Take 1 tablet by mouth daily    fluticasone (FLONASE) 50 MCG/ACT nasal spray USE 2 SPRAYS IN EACH NOSTRIL DAILY    busPIRone (BUSPAR) 10 MG tablet  · Pt was instructed by PCP to increase the dose to 20 mg TID on 4/30/19. Pt stopped taking duloxetine abruptly, likely the cause of increase anxiety. Take 20 mg by mouth 3 times daily    aspirin 81 MG tablet Take 81 mg by mouth daily    diltiazem (TIAZAC) 120 MG extended release capsule  · Pt stopped taking abruptly without notifying MD about 2-3 weeks ago. Instructed sister to resume this medication as he was receiving this in the hospital and it is the only BP medication currently prescribed (losartan was stopped at d/c). Take 120 mg by mouth daily    PROAIR  (90 Base) MCG/ACT inhaler INHALE 2 PUFFS BY MOUTH 4 TIMES DAILY WHEN AWAY FROM HOME    atorvastatin (LIPITOR) 40 MG tablet TAKE 1 TABLET BY MOUTH DAILY    DULoxetine (CYMBALTA) 60 MG extended release capsule  · Pt stopped taking this abruptly about 2-3 weeks ago bc he \"was tired of taking so many medications\". Instructed sister that this should not be stopped abruptly and can cause serious side effects and increase anxiety. Pt was receiving while inpatient. Instructed sister to have pt resume this medication.   Take 1 capsule by mouth 2 times daily    vitamin D (CHOLECALCIFEROL) 89117 UNIT CAPS Take 1 capsule by mouth once a week    aclidinium (TUDORZA PRESSAIR) 400 MCG/ACT AEPB inhaler Inhale 1 puff into the lungs 2 times daily    oxyCODONE-acetaminophen (PERCOCET)  MG per tablet Take 1 tablet by mouth every 6 hours as needed for Pain.  OXYCONTIN 15 MG T12A extended release tablet Take 1 tablet by mouth every 12 hours.  ipratropium-albuterol (DUONEB) 0.5-2.5 (3) MG/3ML SOLN nebulizer solution Use qid     These are the medications you have told us you were taking at home, STOP taking them after you leave the hospital   · Losartan and Medrol - patient is no longer taking losartan and Medrol. Unsure why losartan was stopped - no discharge summary at the time of chart review. Pt was receiving while inpatient. Meds to Beds:Yes    Estimated Creatinine Clearance: 76 mL/min (based on SCr of 0.8 mg/dL). Assessment/Plan:  - Medication reconciliation completed. Number of medications reviewed: 16    - Pt is not taking medications as directed by discharging physician. Number of discrepancies: 2. Instructions per discharge list provided except per below documentation. Identified medication discrepancies/issues:   · Category 1 (0)  · Category 2 (0)  · Category 3 (2):  1. Duloxetine and diltiazem - patient stopped taking these medications about 2-3 weeks ago on his own, without MD approval. Provided extensive education regarding the importance of taking these medications as prescribed and the risk of side effects if these medications are stopped abruptly. Duloxetine needs to be tapered slowly (~ 4 weeks) if it is to be discontinued. Likely the patient was experiencing side effects from abruptly stopping duloxetine, which exacerbated his COPD and is what caused this hospitalization. Patient feels much better now that he is home and coincidentally, he was receiving both of these medications while inpatient.    · Category 4 (0)    - [facility] active medication list updated:  · Medications Added (0)  · Medications Removed (0)  · Medications Changed (0)    - Identified Potential Medication Interactions: No clinically significant interactions identified via [employer] Interaction Analysis as category D or higher.    - Renal Dosing: No renal adjustments necessary.    - Follow up appointment date (7 days for more severe illness, 14 days for others):    · Patient encouraged to schedule follow up with PCP.     Thank you,    Jerod Love, PharmD, [address] [facility]  Direct: [phone]  Department, toll free [phone], option 725 [facility] Only    TCM Call Made?: Yes  [facility] Select Patient?: Yes  Total # of Interventions Recommended: 2 -   - Updated Order #: 2  Total # Interventions Accepted: 2  Intervention Severity:   - Level 1 Intervention Present?: No   - Level 2 #: 0   - Level 3 #: 2  Outreach Status: Review Complete  Care Coordinator Outreach to Patient?: No  Provider Contacted?: Yes - Note Routed, Routine  Time Spent (min): 45

## 2019-05-09 NOTE — CARE COORDINATION
[facility] Transitions Initial Follow Up Call    Call within 2 business days of discharge: Yes    Patient: Lawrence Room Patient :    MRN: [id number]  Reason for Admission: COPD  Discharge Date: 19 RARS: Readmission Risk Score: 22      Last Discharge [facility]       Complaint Diagnosis Description Type Department Provider    19 Shortness of Breath; Cough COPD exacerbation ([facility] [address] . .. ED to Hosp-Admission (Discharged) (ADMITTED) [facility] Kemi Raymond MD; Bj Chandler. .. Spoke with: patient [address]: [employer] provided:  Obtained and reviewed discharge summary and/or continuity of care documents  Communication with home health agencies or other community services the patient is currently [facility]  Communication with specialists who will assume or re-assume care of the patient's system-specific problems-[facility] [place] Transitions 24 Hour Call    Do you have any ongoing symptoms?:  No  Do you have a copy of your discharge instructions?:  Yes  Have you been contacted by a [facility]?:  Yes  Were you discharged with any Home Care or Post Acute Services:  Yes  Post Acute Services:  Home Health (Comment: [address])  Patient DME:  Shower chair  Patient Home Equipment:  Oxygen, Nebulizer  Do you have support at home?:  Other Caregiver  Are you an active caregiver in your home?:  No  Care Transitions Interventions     Spoke with Javier at [facility], stated they have orders and patient scheduled to be seen today by nurse. Patient reports his breathing is good, no SOB or other breathing difficulties, is using 02 at 3l/m. Denies cough. States his main concern is his pain meds-he had appt with pain clinic yesterday but had to cancel b/c he was in the hosp and they wouldn't give him another appt until  and he only has enough medication to get him through until . Very anxious about this.   Called and spoke

## 2019-05-10 NOTE — TELEPHONE ENCOUNTER
Pt called to schedule hospital f/u from 5/4-5/8 at [facility]. PCP had opening on 5/15 @ 9:40am, but that it to early for him. Pt wanting to know if he could get in any time after 2pm any day.  Call back [phone]

## 2019-05-10 NOTE — TELEPHONE ENCOUNTER
Please note that CTC RN was able to get the patient an appointment with his pain management clinic yesterday at 4 pm.    Lew Taylor, PharmD, [address]  Direct: [phone]  Department, toll free [phone], option 7

## 2019-05-10 NOTE — TELEPHONE ENCOUNTER
Please review message from Dr Anthony Damon. Patient will have to obtain his pain medication from pain management. We no longer prescribe controlled substances.

## 2019-05-14 NOTE — TELEPHONE ENCOUNTER
I will sign the papers.   Let us know how much diuretic he is on and how swollen his legs are after assessment

## 2019-05-16 NOTE — CARE COORDINATION
Agn[facility] Transitions Follow Up Call    2019    Patient: Sana Aponte  Patient :    MRN: [id number]  Reason for Admission: COPD   Discharge Date: 19 RARS: Readmission Risk Score: 22         Spoke with: Ilana Transitions Subsequent and Final Call    Subsequent and Final Calls  Are you currently active with any services?:  Home Health  Care Transitions Interventions  Other Interventions:          Spoke with patient who reports he is feeling pretty good but has swelling in his legs and feet. Patient denied any CP or SOB at this time. Patient stated he doesn't have a scale and is unable to monitor his weight. [facility] encouraged patient to reach out to his PCP asap to discuss leg swelling. Patient did state he took his Lasix 40 mg today and is watching his sodium intake. Patient reported he is urinating but not a lot and trying to keep his legs elevated. Patient stated he thought his home care nurse was supposed to come out today but hasn't heard, [facility] offered to call [facility] to verify and request home care visit. Denies any needs at present time. Agreeable to f/u calls. Gave reminder that if they have any questions/concerns at anytime, they can always call PCP/specialist as they always have an MD on call . Also advised that they can always contact their home care provider to request a nurse visit even if it isn't their regularly scheduled day for their nurse to visit. [facility] contacted [facility] and spoke with Micki Mclaughlin in scheduling who stated patient is scheduled for a nurse visit tomorrow but she would try to get a nurse out to see patient today if possible. Micki Mclaughlin stated she would also contact the patient either way. [facility] contacted patient back and informed him of the above that azeb with [facility] will be touching base with him about his visit. [facility] again encouraged patient to reach out to PCP.          Follow Up  Future Appointments   Date Time Provider Department [place]   5/21/2019  2:40 PM Denzel Sterling MD [facility]   7/8/2019  4:40 PM Denzel Sterling MD [facility]       Silke Cervantes RN, BSN, [address] [facility] Transitions Coordinator    [phone]

## 2019-05-16 NOTE — TELEPHONE ENCOUNTER
Pt states that he has major swelling in his feet and legs states that they are swollen to his knees and he uses [facility] in [employer]

## 2019-05-16 NOTE — TELEPHONE ENCOUNTER
I would normally recommend taking an extra furosemide; however, this may cause his hyponatremia to become worse unless he is truly fluid overloaded.   Will refer to Dr. Erika Lundborg

## 2019-05-20 NOTE — TELEPHONE ENCOUNTER
Alysia Taylor with [employer] called regarding pt Buspar medication. Pt is supposed to be taking Buspar 10mg 3x daily, but pt has been cutting them in half and only taking 2x a day since Friday(5/17). Pt stated it's making him feel loopy and crazy, and just doesn't like how it's making him feel.  Call back Alysia Taylor with questions [phone]

## 2019-05-21 NOTE — PROGRESS NOTES
Post-Discharge Transitional Care Management Jenae Mustafa   YOB: 1945    Date of Visit:  5/21/2019    No Known Allergies  Outpatient Medications Marked as Taking for the 5/21/19 encounter (Office Visit) with Guera Padilla MD   Medication Sig Dispense Refill    SYMBICORT 160-4.5 MCG/ACT AERO INHALE 2 PUFFS BY MOUTH 2 TIMES A DAY 10.2 g 11    furosemide (LASIX) 40 MG tablet 1 tab daily as needed for swelling 30 tablet 1    potassium chloride (KLOR-CON M) 20 MEQ extended release tablet Take 1 tablet by mouth daily 30 tablet 1    fluticasone (FLONASE) 50 MCG/ACT nasal spray USE 2 SPRAYS IN EACH NOSTRIL DAILY 16 g 11    busPIRone (BUSPAR) 10 MG tablet Take 1 tablet by mouth 3 times daily (Patient taking differently: Take 20 mg by mouth 3 times daily ) 30 tablet 2    aspirin 81 MG tablet Take 81 mg by mouth daily      diltiazem (TIAZAC) 120 MG extended release capsule Take 120 mg by mouth daily      PROAIR  (90 Base) MCG/ACT inhaler INHALE 2 PUFFS BY MOUTH 4 TIMES DAILY WHEN AWAY FROM HOME 8.5 g 11    atorvastatin (LIPITOR) 40 MG tablet TAKE 1 TABLET BY MOUTH DAILY 30 tablet 11    DULoxetine (CYMBALTA) 60 MG extended release capsule Take 1 capsule by mouth 2 times daily 60 capsule 11    vitamin D (CHOLECALCIFEROL) 86101 UNIT CAPS Take 1 capsule by mouth once a week 4 capsule 11    aclidinium (TUDORZA PRESSAIR) 400 MCG/ACT AEPB inhaler Inhale 1 puff into the lungs 2 times daily 1 each 11    oxyCODONE-acetaminophen (PERCOCET)  MG per tablet Take 1 tablet by mouth every 6 hours as needed for Pain.  OXYCONTIN 15 MG T12A extended release tablet Take 1 tablet by mouth every 12 hours.    0    ipratropium-albuterol (DUONEB) 0.5-2.5 (3) MG/3ML SOLN nebulizer solution Use qid 120 vial 11         Vitals:    05/21/19 1443   BP: (!) 160/88   Site: Left Upper Arm   Position: Sitting   Cuff Size: Medium Adult   Pulse: 76   SpO2: 93%   Weight: 163 lb (73.9 kg) Body mass index is 25.53 kg/m². Wt Readings from Last 3 Encounters:   05/21/19 163 lb (73.9 kg)   05/06/19 173 lb 15.1 oz (78.9 kg)   04/18/19 164 lb (74.4 kg)     BP Readings from Last 3 Encounters:   05/21/19 (!) 160/88   05/08/19 132/82   04/18/19 (!) 142/95      Discussed low sodium and H&H while in the hospital.    Patient was admitted to [facility] from 5/4/19 to 5/8/19 for acute respiratory failure. Inpatient course: Discharge summary reviewed- see chart. Current status: Patient is extremely confused about his medicine. It's hard to tell what he is taking and what he is not. He states 2 or 3 people telling multiple things. At one point we understood Sr. was setting up medicine but now we understand from him that is not true. He does have home care. He had called in stating that BuSpar made him feel confused. He had quit smoking but now has went back to smoking and stopped the nicotine patch. He states that he knows that he is dying. Review of Systems:  A comprehensive review of systems was negative except for what was noted in the HPI.     Physical Exam:  General Appearance: alert and oriented to person, place and time, well developed and well- nourished, in no acute distress  Skin: warm and dry, no rash or erythema  Head: normocephalic and atraumatic  Eyes: pupils equal, round, and reactive to light, extraocular eye movements intact, conjunctivae normal  ENT: tympanic membrane, external ear and ear canal normal bilaterally, nose without deformity, nasal mucosa and turbinates normal without polyps  Neck: supple and non-tender without mass, no thyromegaly or thyroid nodules, no cervical lymphadenopathy  Pulmonary/Chest: diminished breath sounds through out  Cardiovascular: normal rate, regular rhythm, normal S1 and S2, no murmurs, rubs, clicks, or gallops, distal pulses intact, no carotid bruits  Abdomen: soft, non-tender, non-distended, normal bowel sounds, no masses or organomegaly  Extremities: no cyanosis, clubbing, he has 4+LE bilateral edema  Musculoskeletal: normal range of motion, no joint swelling, deformity or tenderness  Neurologic: reflexes normal and symmetric, no cranial nerve deficit, gait, coordination and speech normal    Initial post-discharge communication occurred between nurse care coordinator and patient on 5/21/19- see documentation in chart: telephone encounter. Assessment/Plan:  Richard Nelson was seen today for follow-up from hospital, [facility] and other. Diagnoses and all orders for this visit:    Hyponatremia  -     Basic Metabolic Panel    Anemia, unspecified type  -     CBC    Compression fracture of lumbar vertebra, sequela  -     DULoxetine (CYMBALTA) 60 MG extended release capsule; Take 1 capsule by mouth daily    Post-traumatic osteoarthritis of multiple joints  -     DULoxetine (CYMBALTA) 60 MG extended release capsule; Take 1 capsule by mouth daily    Chronic pain syndrome  -     DULoxetine (CYMBALTA) 60 MG extended release capsule; Take 1 capsule by mouth daily    Facet joint disease of lumbosacral region  -     DULoxetine (CYMBALTA) 60 MG extended release capsule; Take 1 capsule by mouth daily    Reactive depression  -     DULoxetine (CYMBALTA) 60 MG extended release capsule; Take 1 capsule by mouth daily    Lumbosacral radiculopathy  -     DULoxetine (CYMBALTA) 60 MG extended release capsule; Take 1 capsule by mouth daily    Degeneration of intervertebral disc of lumbar region  -     DULoxetine (CYMBALTA) 60 MG extended release capsule; Take 1 capsule by mouth daily    Chronic midline low back pain with bilateral sciatica  -     DULoxetine (CYMBALTA) 60 MG extended release capsule; Take 1 capsule by mouth daily    Primary insomnia  -     DULoxetine (CYMBALTA) 60 MG extended release capsule;  Take 1 capsule by mouth daily    Smoker's respiratory syndrome    Hyperglycemia    Coronary artery disease due to lipid rich plaque    Mixed hyperlipidemia    Mucopurulent chronic bronchitis (HCC)    Anxiety    Tobacco abuse    Benign essential hypertension    COPD exacerbation (HCC)    Patient's COPD is quite severe. Significant anxiety about his health. Very concerned about his confusion medication. I have discontinued BuSpar. Reduce Cymbalta once a day. We will simplify his medication regimen and ask home care to set up medicine for him in organized boxes. His pain medicine is prescribed by others. He will also have his inhalers. Unfortunately I think there is very little to offer to further assist him with his breathing with his end-stage COPD. His sodium was low he was somewhat anemic at the hospital we will address. The patient continues to smoke. The patient knows they should quit. The patient is aware of the risks of continuing tobacco use. Blood pressure acceptable. Follow-up early July as already scheduled. Patient should call the office immediately with new or ongoing signs or symptoms or worsening, or proceed to the emergency room. No changes in past medical history, past surgical history, social history, or family history were noted during the patient encounter unless specifically listed above. All updates of past medical history, past surgical history, social history, or family history were reviewed personally by me during the office visit. All problems listed in the assessment are stable unless noted otherwise. Medication profile reviewed personally by me during the office visit. Medication side effects and possible impairments from medications were discussed as applicable. This document was prepared by a combination of typing and transcription through a voice recognition software.         Diagnostic test results reviewed: inpatient labs and chest x-ray    Patient risk of morbidity and mortality: moderate    Medical Decision Making: moderate complexity

## 2019-05-22 NOTE — CARE COORDINATION
Call to [facility] to schedule apt. Spoke to BODØ who reports that pt was seen by Dr. Soha Herman when he was admitted in the hospital, and physician only works out of the [facility] location and pt will have to be seen by him. BODØ reports that pt is being seen by Derrick Kraus. NADIA reports that the physicians are very particular about following back up with that particular doctor who visited patient while admitted. BODØ reports that I will need to contact [facility] pulmonary to schedule apt with Dr. Soha Herman. [facility] Apt scheduled with Dr. Soha Herman at [facility] for 6/6/19 @ 10:45AM.     Call to pt to inform him of apt information. Spoke to pt and provided apt information for 6/6/19 with Dr. Soha Herman. Provided patient office address and phone number. Pt reports that he will need to call to reschedule apt for an afternoon apt.       [facility] Coordination   R:[phone]  N:[phone]

## 2019-05-22 NOTE — CARE COORDINATION
Ambulatory Care Coordination Note  5/22/2019  CM Risk Score: 9  Adan Mortality Risk Score: 6    ACC: Johana Mccary RN    Summary Note: ACC spoke with patient for introduction to care coordination. He is agreable. He is active with home care, SN, PT and OT. He feels he is getting stronger. He is using 2 to 2.5 l/nc. Sat baseline is  96 to 98%. He does continue to smoke. Smoke 1 ppd. Has tried patches, gum, chantix, no real success. He is trying to decrease daily. He is needing help setting up follow up appt with [facility]. Reviewed copd zone and will need reinforced. Explained role of care coordination. Past Medical History:   Diagnosis Date    Amputation finger     Ankle fracture, left     Chronic nasal congestion     Chronic pain syndrome     Compression fracture of lumbar vertebra (HCC)     COPD (chronic obstructive pulmonary disease) (HCC)     DDD (degenerative disc disease)     Depression     Facet joint disease of lumbosacral region     Hyperglycemia     Hyperlipidemia     Insomnia     Radiculopathy     Smoker's respiratory syndrome      Plan      Copd zones teaching  Follow up on pulmonary appt  Call weekly         Care Coordination Interventions    Program Enrollment:  Complex Care  Referral from Primary Care Provider:  No  Suggested Interventions and Community Resources         Goals Addressed                 This Visit's Progress       Care Coordination     Conditions and Symptoms   Improving     I will schedule office visits, as directed by my provider. I will keep my appointment or reschedule if I have to cancel. I will notify my provider of any barriers to my plan of care. I will follow my Zone Management tool to seek urgent or emergent care. I will notify my provider of any symptoms that indicate a worsening of my condition.     Barriers: none  Plan for overcoming my barriers: N/A  Confidence: 6/10  Anticipated Goal Completion Date: 7/19    Zone teaching initiated and will

## 2019-05-24 NOTE — TELEPHONE ENCOUNTER
Tiffanie  with [employer] called with concerns regarding pt. States pt is having increased anxiety and trouble sleeping where he wakes up with an anxiety attack. After reviewing pt chart, pt was on Buspar 10MG 2 tabs 3x daily before he went into the hospital. Hospital d/c the [address] [place] [address] believes this could be the cause of the increase anxiety. Pt does have a full bottle of the Buspar, but she's wanting to know if it would be okay to start pt back on this medication.  Call back Tiffanie  [phone]

## 2019-05-24 NOTE — PATIENT INSTRUCTIONS
trying to quit smoking. · Consider signing up for a smoking cessation program, such as the American Lung Association's Freedom from Smoking program.  · Get text messaging support. Go to the website at www.smokefree. gov to sign up for the [facility] program.  · Set a quit date. Pick your date carefully so that it is not right in the middle of a big deadline or stressful time. Once you quit, do not even take a puff. Get rid of all ashtrays and lighters after your last cigarette. Clean your house and your clothes so that they do not smell of smoke. · Learn how to be a nonsmoker. Think about ways you can avoid those things that make you reach for a cigarette. ? Avoid situations that put you at greatest risk for smoking. For some people, it is hard to have a drink with friends without smoking. For others, they might skip a coffee break with coworkers who smoke. ? Change your daily routine. Take a different route to work or eat a meal in a different place. · Cut down on stress. Calm yourself or release tension by doing an activity you enjoy, such as reading a book, taking a hot bath, or gardening. · Talk to your doctor or pharmacist about nicotine replacement therapy, which replaces the nicotine in your body. You still get nicotine but you do not use tobacco. Nicotine replacement products help you slowly reduce the amount of nicotine you need. These products come in several forms, many of them available over-the-counter:  ? Nicotine patches  ? Nicotine gum and lozenges  ? Nicotine inhaler  · Ask your doctor about bupropion (Wellbutrin) or varenicline (Chantix), which are prescription medicines. They do not contain nicotine. They help you by reducing withdrawal symptoms, such as stress and anxiety. · Some people find hypnosis, acupuncture, and massage helpful for ending the smoking habit. · Eat a healthy diet and get regular exercise.  Having healthy habits will help your body move past its craving for

## 2019-05-24 NOTE — TELEPHONE ENCOUNTER
Pilar Christensen PT with [address] called regarding pt. Pilar Christensen met with pt for PT eval and the POC will be 1x/week for 1 week and 2x/week for 2 weeks for leg strength, balance, and energy conservation. Call back [phone] with any questions.

## 2019-05-24 NOTE — TELEPHONE ENCOUNTER
[facility] advised - ok to restart buspirone 10 mg 1 PO TID. If that doesn't work, she'll call back and we can see if we can bump up his dose to 20 mg TID.

## 2019-05-24 NOTE — CARE COORDINATION
Base) MCG/ACT inhaler INHALE 2 PUFFS BY MOUTH 4 TIMES DAILY WHEN AWAY FROM HOME 9/21/18   Tyrell Churchill, APRN - CNP   atorvastatin (LIPITOR) 40 MG tablet TAKE 1 TABLET BY MOUTH DAILY 8/21/18   Sherren Dyke, MD   vitamin D (CHOLECALCIFEROL) [address]54 UNIT CAPS Take 1 capsule by mouth once a week 5/25/18   Sherren Dyke, MD   aclidinium [facility]) 400 MCG/ACT AEPB inhaler Inhale 1 puff into the lungs 2 times daily 4/30/18   Sherren Dyke, MD   oxyCODONE-acetaminophen (PERCOCET)  MG per tablet Take 1 tablet by mouth every 6 hours as needed for Pain. 8/31/17   Historical Provider, MD   OXYCONTIN 15 MG T12A extended release tablet Take 1 tablet by mouth every 12 hours.   6/8/17   Historical Provider, MD   Respiratory Therapy Supplies (NEBULIZER COMPRESSOR) KIT 1 kit by Does not apply route once for 1 dose 8/14/17 7/2/18  Sherren Dyke, MD   ipratropium-albuterol (DUONEB) 0.5-2.5 (3) MG/3ML SOLN nebulizer solution Use qid 8/14/17   Sherren Dyke, MD       Future Appointments   Date Time Provider Joanne Kessler   6/6/2019  2:15 PM [address], MD AND [facility]   7/8/2019  4:40 PM Sherren Dyke, MD [place]  [facility]      and   COPD Assessment    Does the patient understand envrionmental exposure?:  Yes  Is the patient able to verbalize Rescue vs. Long Acting medications?:  Yes  Does the patient have a nebulizer?:  Yes  Does the patient use a space with inhaled medications?:  No            Symptoms:

## 2019-06-06 NOTE — TELEPHONE ENCOUNTER
Patient same day cancelled appt (no show) for Hospital FU  with Dr. Ksenia Lynn on 6/6/19. Reason:  No reason given    This is patient's first no show. Patient was a no show on: NA.      Patient did not reschedule. Reschedule date:  Pt declined to reschedule. Last hospital note 5/8/19 Dr. Robyn Marquis  Assessment:  Principal Problem:    COPD exacerbation ([address]  Active Problems:    Chronic pain syndrome    Radiculopathy    Mixed hyperlipidemia    Tobacco abuse    Acute on chronic respiratory failure with hypercapnia (HCC)    Macrocytic anemia  Resolved Problems:    * No resolved hospital problems.  *              Plan:   · Oxygen supplementation to keep saturation between 90-94% only  · Patient does not qulaify for home BIPAP from the hospital as per CMS guidelines  · Will do overnight pulse oximetry @ 3.5 lts/min of oxygen to see if patient qualifies from a BIPAP/NIV from the hospital  · Saline nasal spray   · Pulmonary toilet  · Bronchodilators  · No need for any Dulera with duoneb  · No need for any antibiotics  · PO prednisone   · Patient is on BuSpar/Cymbalta/OxyContin and also on Ativan on when necessary basis which can cause patient to have worsening hypoventilation and hypercarbia which can contributing to patient's symptoms symptomatology  · Antihypertensives as per IM  · PUD and DVT prophylaxis as per IM  · Evaluation and management of macrocytic anemia as per IM     Palliative care following the patient   Patient is a Limited code at this time

## 2019-06-07 NOTE — TELEPHONE ENCOUNTER
[facility] informed of esteban recommendation and to get the chest xray done

## 2019-06-13 PROBLEM — J96.22 ACUTE AND CHRONIC RESPIRATORY FAILURE WITH HYPERCAPNIA (HCC): Status: ACTIVE | Noted: 2019-01-01

## 2019-06-13 NOTE — PROGRESS NOTES
Pharmacy Medication Reconciliation Note     List of medications patient is currently taking is complete. Allergies:  Patient has no known allergies. Source of information:   1. Disp history   2. patient    Notes regarding home medications:   1. Changed Oxycontin to 10mg BID  2. Patient confirms not taking Buspar - did not like side effects. 3. Patient unable to confirm if still on Diltiazem.  (90 day supply last filled end of Feb 2019) left on med list        Denies taking any other OTC or herbal medications    Kassy Aparicio, [address] 6/13/2019 1:38 PM

## 2019-06-13 NOTE — PROGRESS NOTES
Pt transported from ER to PCU ROOM 5106 on a BIPAP 20/8, RR 20, FIO2 97% without complications.     Electronically signed by Livan Kumar RCP on 6/13/19 at 5:02 AM

## 2019-06-13 NOTE — PROGRESS NOTES
ABG obtained sent to lab. Placed patient on BiPAP after ABG due to increased RR and WOB.  sats 94% 125HR  RR31  Electronically signed by Marva Brownlee on 6/13/2019 at 10:39 AM

## 2019-06-13 NOTE — ED NOTES
Daughter states increased confusion and hallucinating for past few days. C/o shortness of breath and chest pain. States productive cough. Normally wears oxygen 2-3 liters. Daughter states recent admission for co2 retention and needed bi-pap.       Kit Galvan RN  06/13/19 0000

## 2019-06-13 NOTE — ED PROVIDER NOTES
Narrative    None       SCREENINGS      @FLOW([id number])@      PHYSICAL EXAM    (up to 7 for level 4, 8 or more for level 5)     ED Triage Vitals [06/12/19 2346]   BP Temp Temp Source Pulse Resp SpO2 Height Weight   (!) 173/102 97.7 °F (36.5 °C) Oral 114 26 96 % 5' 7\" (1.702 m) 161 lb 2.5 oz (73.1 kg)       Physical Exam   Constitutional: He is oriented to person, place, and time. He appears well-developed and well-nourished. No distress. HENT:   Head: Normocephalic and atraumatic. Eyes: Conjunctivae and EOM are normal.   Neck: Normal range of motion. No tracheal deviation present. Cardiovascular: Normal rate and regular rhythm. Pulmonary/Chest: He is in respiratory distress. He has wheezes. He has no rales. Abdominal: Soft. He exhibits no distension. There is no tenderness. There is no CVA tenderness. Musculoskeletal: Normal range of motion. He exhibits edema. He exhibits no tenderness or deformity. Neurological: He is alert and oriented to person, place, and time. Skin: Skin is warm and dry. Vitals reviewed. DIAGNOSTIC RESULTS     EKG: All EKG's are interpreted by the Emergency Department Physician who either signs or Co-signsthis chart in the absence of a cardiologist.    EKG shows a sinus rhythm with ventricular rate of 102 bpm.  Patient has a slightly prolonged MT interval and QTC is within normal limits. Patient has left axis deviation there are no significant ST elevations or depressions and EKG is nondiagnostic for ACS. There are nonspecific ST changes to the lateral leads compared to EKG from 5/4/2019. RADIOLOGY:   Non-plain filmimages such as CT, Ultrasound and MRI are read by the radiologist. Plain radiographic images are visualized and preliminarily interpreted by the emergency physician with the below findings:      Interpretation per the Radiologist below, if available at the time ofthis note:    XR CHEST STANDARD (2 VW)   Final Result   No acute disease.                ED BEDSIDE ULTRASOUND:   Performed by ED Physician - none    LABS:  Labs Reviewed   CBC WITH AUTO DIFFERENTIAL - Abnormal; Notable for the following components:       Result Value    RBC 3.77 (*)     Hemoglobin 12.9 (*)     Hematocrit 39.1 (*)     .8 (*)     MCH 34.2 (*)     Neutrophils # 8.4 (*)     All other components within normal limits    Narrative:     Performed at:  [address] [facility] [employer]   Phone [phone]   COMPREHENSIVE METABOLIC PANEL W/ REFLEX TO MG FOR LOW K - Abnormal; Notable for the following components:    Chloride 88 (*)     CO2 40 (*)     Glucose 122 (*)     All other components within normal limits    Narrative:     Performed at:  [address] [facility] [employer]   Phone [phone]   TROPONIN - Abnormal; Notable for the following components:    Troponin 0.02 (*)     All other components within normal limits    Narrative:     Performed at:  [facility]  [address], [facility]   Phone [phone]   BLOOD GAS, VENOUS - Abnormal; Notable for the following components:    pH, Charanjit 7.320 (*)     pCO2, Charanjit 91.2 (*)     HCO3, Venous 46 (*)     All other components within normal limits    Narrative:     Performed at:  [place] [address] [place] [facility] [employer] [address]   Phone [phone]   HEPATIC FUNCTION PANEL    Narrative:     Performed at:  [address] [facility] [employer]   Phone [phone]   LACTIC ACID, PLASMA    Narrative:     Performed at:  [address] [facility] [employer]   Phone [phone]   BRAIN NATRIURETIC PEPTIDE    Narrative:     Performed at:  [facility]  [address] [place] [address] [facility] [employer] [address]   Phone [phone]   URINE RT REFLEX TO CULTURE       All other labs were within normal range or not returned as of this dictation. EMERGENCY DEPARTMENT COURSE and DIFFERENTIAL DIAGNOSIS/MDM:   Vitals:    Vitals:    06/12/19 2346 06/13/19 0105 06/13/19 0110 06/13/19 0135   BP: (!) 173/102  (!) 134/106 107/73   Pulse: 114  101 96   Resp: 26 (!) 32 21 20   Temp: 97.7 °F (36.5 °C)      TempSrc: Oral      SpO2: 96% 100% 92% 100%   Weight: 161 lb 2.5 oz (73.1 kg)      Height: 5' 7\" (1.702 m)              MDM  Number of Diagnoses or Management Options  Diagnosis management comments: 74-year-old male presents the ED for evaluation of shortness of breath. Patient is a known history of COPD and on presentation is satting well with supplemental oxygen but has increased work of breathing with accessory muscle use. He appears to be in mild respiratory distress but is able to speak in full sentences. Patient is afebrile denies having a cough patient has had multiple. Admissions for COPD exacerbation and his daughter states that he is becoming more of a frequent occurrence. We will obtain basic laboratory work-up including VBG and chest x-ray. Differential diagnosis includes COPD exacerbation, CHF exacerbation, pneumonia, bronchitis, and pneumothorax. Amount and/or Complexity of Data Reviewed  Decide to obtain previous medical records or to obtain history from someone other than the patient: yes          REASSESSMENT      On reevaluation the patient is resting comfortably appears to be no acute distress. Vital signs are improved after the patient was placed on BiPAP due to hypercapnia. Troponin is slightly elevated but this is likely secondary to demand ischemia as the patient has been tachycardic. Patient has had slightly elevated troponins in the past.  EKG is nondiagnostic for ACS and the patient not complaining of chest pain.   Due to the patient's episodes of altered mental status and hypercapnia he will be admitted to the hospital for further medical management evaluation. CRITICAL CARE TIME   Total Critical Care time was 45 minutes, excluding separatelyreportable procedures. There was a high probability ofclinically significant/life threatening deterioration in the patient's condition which required my urgent intervention. CONSULTS:  None    PROCEDURES:  Unless otherwise noted below, none     Procedures    FINAL IMPRESSION      1. COPD exacerbation ([address]    2. Hypercapnia          DISPOSITION/PLAN   DISPOSITION        PATIENT REFERREDTO:  No follow-up provider specified.     DISCHARGEMEDICATIONS:  New Prescriptions    No medications on file          (Please note that portions of this note were completed with a voice recognition program.  Efforts were made to edit the dictations but occasionally words are mis-transcribed.)    Karina Alanis MD (electronically signed)  Attending Emergency Physician         Karina Alanis MD  06/13/19 5704

## 2019-06-13 NOTE — PROGRESS NOTES
06/13/19 0105   NIV Type   $NIV $Daily Charge   Skin Assessment Clean, dry, & intact   NIV Started Yes   Equipment Type V60   Mode BIPAP   Mask Type Full face mask   Mask Size Medium   Settings/Measurements   Comfort Level Fair   Using Accessory Muscles Yes   IPAP 20 cmH20   EPAP 8 cmH2O   Rate Ordered 20   Resp (!) 32   SpO2 97   FiO2  40 %   I Time/ I Time % 1 s   Vt Exhaled 480 mL   Mask Leak (lpm) 4 lpm   Breath Sounds   Right Upper Lobe Coarse Crackles   Right Middle Lobe Coarse Crackles   Right Lower Lobe Coarse Crackles   Left Upper Lobe Coarse Crackles   Left Lower Lobe Coarse Crackles   Alarm Settings   Alarms On Y

## 2019-06-13 NOTE — CONSULTS
Patient is seen at the request of Dr. Wil Gauthier for respiratory failure    PCP: Chip Pedersen MD    HISTORY OF PRESENT ILLNESS: 75y.o. year old male with emphysema who was admitted on 6/13/19 for respiratory failure. He reports that over the past few days he has noticed increased exertional shortness of breath. He is short of breath walking short distances. His breathing improves with rest.  He denies cough or sputum production. He denies sick contacts. In the ER he was found to be in respiratory distress and was placed on BiPAP. ABG showed acute on chronic CO2 retention. He reports that he uses Symbicort and Ayse Louise as well as albuterol inhaler as needed. He also has nebulizers, but does not usually use them. He uses 2.5 L of oxygen continuously. He smokes 1 pack of cigarettes per day.     PAST MEDICAL HISTORY:  Past Medical History:   Diagnosis Date    Amputation finger     Ankle fracture, left     Chronic nasal congestion     Chronic pain syndrome     Compression fracture of lumbar vertebra (HCC)     COPD (chronic obstructive pulmonary disease) (HCC)     DDD (degenerative disc disease)     Depression     Facet joint disease of lumbosacral region     Hyperglycemia     Hyperlipidemia     Insomnia     Radiculopathy     Smoker's respiratory syndrome        PAST SURGICAL HISTORY:  Past Surgical History:   Procedure Laterality Date    AMPUTATION      COLONOSCOPY  05/02/2016    colonic polyps    HERNIA REPAIR           MEDICATIONS:  Current Facility-Administered Medications: aspirin chewable tablet 81 mg, 81 mg, Oral, Daily  atorvastatin (LIPITOR) tablet 40 mg, 40 mg, Oral, Daily  diltiazem (CARDIZEM CD) extended release capsule 120 mg, 120 mg, Oral, Daily  DULoxetine (CYMBALTA) extended release capsule 60 mg, 60 mg, Oral, Daily  mometasone-formoterol (DULERA) 100-5 MCG/ACT inhaler 2 puff, 2 puff, Inhalation, BID  sodium chloride flush 0.9 % injection 10 mL, 10 mL, Intravenous, 2 times per day  sodium chloride flush 0.9 % injection 10 mL, 10 mL, Intravenous, PRN  magnesium hydroxide (MILK OF MAGNESIA) 400 MG/5ML suspension 30 mL, 30 mL, Oral, Daily PRN  ondansetron (ZOFRAN) injection 4 mg, 4 mg, Intravenous, Q6H PRN  enoxaparin (LOVENOX) injection 40 mg, 40 mg, Subcutaneous, Daily  ipratropium-albuterol (DUONEB) nebulizer solution 1 ampule, 1 ampule, Inhalation, Q4H WA  acetaminophen (TYLENOL) tablet 650 mg, 650 mg, Oral, Q4H PRN  cefTRIAXone (ROCEPHIN) 1 g IVPB in 50 mL D5W minibag, 1 g, Intravenous, Q24H  azithromycin (ZITHROMAX) tablet 500 mg, 500 mg, Oral, Daily **FOLLOWED BY** [START ON 6/14/2019] azithromycin (ZITHROMAX) tablet 250 mg, 250 mg, Oral, Daily  methylPREDNISolone sodium (SOLU-MEDROL) injection 60 mg, 60 mg, Intravenous, Q6H **FOLLOWED BY** [START ON 6/15/2019] predniSONE (DELTASONE) tablet 40 mg, 40 mg, Oral, Daily      ALLERGIES:  Patient has No Known Allergies. FAMILY HISTORY:  family history includes Cancer in his mother; Diabetes in his father; Heart Disease in his sister. SOCIAL HISTORY:  Social History     Socioeconomic History    Marital status:       Spouse name: Not on file    Number of children: Not on file    Years of education: Not on file    Highest education level: Not on file   Occupational History    Not on file   Social Needs    Financial resource strain: Not on file    Food insecurity:     Worry: Not on file     Inability: Not on file    Transportation needs:     Medical: Not on file     Non-medical: Not on file   Tobacco Use    Smoking status: Current Some Day Smoker     Packs/day: 2.00     Years: 25.00     Pack years: 50.00     Types: Cigarettes    Smokeless tobacco: Never Used   Substance and Sexual Activity    Alcohol use: No     Alcohol/week: 0.0 oz     Comment: occasional    Drug use: No    Sexual activity: Not Currently   Lifestyle    Physical activity:     Days per week: Not on file     Minutes per session: Not on file    Stress: Not on file   Relationships    Social connections:     Talks on phone: Not on file     Gets together: Not on file     Attends [employer] service: Not on file     Active member of club or organization: Not on file     Attends meetings of clubs or organizations: Not on file     Relationship status: Not on file    Intimate partner violence:     Fear of current or ex partner: Not on file     Emotionally abused: Not on file     Physically abused: Not on file     Forced sexual activity: Not on file   Other Topics Concern    Not on file   Social History Narrative    Not on file   Smokes 1 pack per day     reports that he has been smoking cigarettes. He has a 50.00 pack-year smoking history. He has never used smokeless tobacco.    REVIEW OF SYSTEMS:  Constitutional: Negative for fever  HENT: Negative for sore throat  Eyes: Negative for redness   Respiratory: + for dyspnea, no cough  Cardiovascular: Negative for chest pain  Gastrointestinal: Negative for vomiting, diarrhea   Genitourinary: Negative for hematuria   Musculoskeletal: Negative for arthralgias   Skin: Negative for rash  Neurological: Negative for syncope  Hematological: Negative for adenopathy  Psychiatric/Behavorial: Negative for anxiety    Objective:   PHYSICAL EXAM:  Blood pressure (!) 125/90, pulse 98, temperature 97.7 °F (36.5 °C), temperature source Oral, resp. rate 20, height 5' 7\" (1.702 m), weight 174 lb 2.6 oz (79 kg), SpO2 100 %. on 4L NC    Gen: Well developed; well nourished  Eyes: No scleral icterus. No conjunctival injection. ENT:  Oropharynx clear. External appearance of ears and nose normal.  Neck: Trachea midline. No obvious mass. No visible thyroid enlargement    Respiratory: Markedly decreased breath sounds diffusely, no accessory muscle use  Cardiovascular: Regular rate and rhythm, no appreciable murmurs. Bilateral lower extremity edema. Gastrointestinal: Soft, non-tender. No hernia  Skin: Warm and dry.  No rashes or ulcers on supplemental oxygen to keep saturation greater than 90%      Thank you for allowing me to participate in the care of this patient. Will follow.      Migue Bryan MD  [facility] Pulmonary, Critical Care and Sleep

## 2019-06-13 NOTE — H&P
Hospital Medicine History & Physical      PCP: Blayne Fischer MD    Date of Admission: 6/12/2019    Date of Service: Pt seen/examined on 6/13/2019 and Admitted to Inpatient with expected LOS greater than two midnights due to medical therapy. Chief Complaint: Shortness of breath      History Of Present Illness:     75 y.o. male who presented [facility] [place] [facility] [place] with shortness of breath. Patient has been poorly compliant with therapy at home. He was brought in the hospital with increasing dyspnea. Patient was noted to be in distress and was placed on BiPAP mode ventilation. he continues to smoke. He is oxygen dependent at home. Patient is somnolent right now and is not a very good historian    Past Medical History:          Diagnosis Date    Amputation finger     Ankle fracture, left     Chronic nasal congestion     Chronic pain syndrome     Compression fracture of lumbar vertebra (HCC)     COPD (chronic obstructive pulmonary disease) (HCC)     DDD (degenerative disc disease)     Depression     Facet joint disease of lumbosacral region     Hyperglycemia     Hyperlipidemia     Insomnia     Radiculopathy     Smoker's respiratory syndrome        Past Surgical History:          Procedure Laterality Date    AMPUTATION      COLONOSCOPY  05/02/2016    colonic polyps    HERNIA REPAIR         Medications Prior to Admission:      Prior to Admission medications    Medication Sig Start Date End Date Taking?  Authorizing Provider   furosemide (LASIX) 40 MG tablet 1 tab daily as needed for swelling 6/10/19   Indigo Aquino MD   potassium chloride (KLOR-CON M) 20 MEQ extended release tablet Take 1 tablet by mouth daily 6/10/19   Indigo Aquino MD   potassium chloride (KLOR-CON M) 20 MEQ extended release tablet Take 1 tablet by mouth daily 6/10/19   MD ASHLEY Larios PRESSAIR 400 MCG/ACT AEPB inhaler INHALE 1 PUFF BY MOUTH 2 TIMES A DAY 5/31/19 SAKSHI Verma CNP   DULoxetine (CYMBALTA) 60 MG extended release capsule Take 1 capsule by mouth daily 5/21/19   Tashi Hogan MD   SYMBICORT 160-4.5 MCG/ACT AERO INHALE 2 PUFFS BY MOUTH 2 TIMES A DAY 4/22/19   Tashi Hogan MD   fluticasone [facility]) 50 MCG/ACT nasal spray USE 2 SPRAYS IN [facility] NOSTRIL DAILY 3/19/19   Tashi Hogan MD   aspirin 81 MG tablet Take 81 mg by mouth daily    Historical Provider, MD   diltiazem (TIAZAC) 120 MG extended release capsule Take 120 mg by mouth daily    Historical Provider, MD   PROAIR  (90 Base) MCG/ACT inhaler INHALE 2 PUFFS BY MOUTH 4 TIMES DAILY WHEN AWAY FROM HOME 9/21/18   SAKSHI Verma CNP   atorvastatin (LIPITOR) 40 MG tablet TAKE 1 TABLET BY MOUTH DAILY 8/21/18   Tashi Hogan MD   vitamin D (CHOLECALCIFEROL) 91493 UNIT CAPS Take 1 capsule by mouth once a week 5/25/18   Tashi Hogan MD   oxyCODONE-acetaminophen (PERCOCET)  MG per tablet Take 1 tablet by mouth every 6 hours as needed for Pain. 8/31/17   Historical Provider, MD   OXYCONTIN 15 MG T12A extended release tablet Take 1 tablet by mouth every 12 hours. 6/8/17   Historical Provider, MD   Respiratory Therapy Supplies (NEBULIZER COMPRESSOR) KIT 1 kit by Does not apply route once for 1 dose 8/14/17 7/2/18  Tashi Hogan MD   ipratropium-albuterol (DUONEB) 0.5-2.5 (3) MG/3ML SOLN nebulizer solution Use qid 8/14/17   Tashi Hogan MD       Allergies:  Patient has no known allergies. Social History:      The patient currently lives with family    TOBACCO:   reports that he has been smoking cigarettes. He has a 50.00 pack-year smoking history. He has never used smokeless tobacco.  ETOH:   reports that he does not drink alcohol. Family History:      Reviewed in detail and negative for DM, CAD, Cancer, CVA.  Positive as follows:        Problem Relation Age of Onset    Cancer Mother  Diabetes Father     Heart Disease Sister        REVIEW OF SYSTEMS:   Pertinent positives as noted in the HPI. All other systems reviewed and negative. PHYSICAL EXAM PERFORMED:    BP (!) 125/90   Pulse 98   Temp 97.7 °F (36.5 °C) (Oral)   Resp 20   Ht 5' 7\" (1.702 m)   Wt 161 lb 2.5 oz (73.1 kg)   SpO2 100%   BMI 25.24 kg/m²     General appearance: Male on BiPAP he is arousable but falls back asleep. HEENT:  Normal cephalic, atraumatic without obvious deformity. Pupils equal, round, and reactive to light. Extra ocular muscles intact. Conjunctivae/corneas clear. Neck: Supple, with full range of motion. No jugular venous distention. Trachea midline. Respiratory: Decreased breath sounds bilaterally  Cardiovascular: Sinus tachycardia  Abdomen: Soft, non-tender, non-distended with normal bowel sounds. Musculoskeletal:  No clubbing, cyanosis or edema bilaterally. Full range of motion without deformity. Skin: Skin color, texture, turgor normal.  No rashes or lesions. Neurologic: Moves arms and legs spontaneously    Capillary Refill: Brisk,< 3 seconds   Peripheral Pulses: +2 palpable, equal bilaterally       Labs:     Recent Labs     06/13/19  0019   WBC 10.6   HGB 12.9*   HCT 39.1*        Recent Labs     06/13/19  0019      K 4.1   CL 88*   CO2 40*   BUN 19   CREATININE 0.8   CALCIUM 9.9     Recent Labs     06/13/19  0019   AST 24   ALT 13   BILIDIR <0.2   BILITOT 0.3   ALKPHOS 74     No results for input(s): INR in the last 72 hours. Recent Labs     06/13/19  0019   TROPONINI 0.02*       Urinalysis:      Lab Results   Component Value Date    NITRU Negative 02/13/2019    BLOODU Negative 02/13/2019    SPECGRAV <=1.005 02/13/2019    GLUCOSEU Negative 02/13/2019       Radiology:     Chest x-ray independently reviewed by me  XR CHEST STANDARD (2 VW)   Final Result   No acute disease.              ASSESSMENT/PLAN:      Acute on chronic hypercapnic respiratory failure-improved with BiPAP his PCO2 is come down. He has a compensatory respiratory acidosis. Continue IV Solu-Medrol  Nebs every 4 hours plus every 2 as needed  Rocephin Zithromax for bronchitis  Pulmonary consult  Hold all pain medications    HTN-stable. Resume meds when pt is able to take po    Prn hydralazine iv for sbp >180 dbp>100    Generalized weakness-PT OT ordered  Family interested in snf    DVT Prophylaxis: lovenox  Diet: DIET LOW SODIUM 2 GM;  Code Status: Full Code        Dispo - 2-3 days       Danny Reyes MD    Thank you Van Mosqueda MD for the opportunity to be involved in this patient's care. If you have any questions or concerns please feel free to contact me at [phone].

## 2019-06-13 NOTE — PROGRESS NOTES
Got a call stating this patient was asking for a breathing treatment. Upon arrival patient had an increased RR and audible wheezing noted. Once I explained what I was doing he refused and said that \" those things are going to kill me \". He insisted that I give him his home rescue inhaler. I explained that the medication I was going to give him is the same medication that is in his inhaler, he still insisted that it was going to \" kill him \". No treatment given at this time.       Electronically signed by Talita Blount on 6/13/2019 at 2:15 PM

## 2019-06-13 NOTE — ED TRIAGE NOTES
Fall screening completed. Daughter states patient has a history of falls since his last admission. Bed alarm placed to patient.

## 2019-06-13 NOTE — ED NOTES
Placed in position of comfort sitting up at bedside with arms on bedside table. States easier to breathe that way.      Pam Partida RN  06/13/19 [id number]

## 2019-06-13 NOTE — PROGRESS NOTES
Occupational Therapy  Chart reviewed and OT eval attempted. Patient currently presents in bed initially on Bipap however RN placed on 4L O2. Therapist attempted to awaken pt however pt presents lethargic as he received Ativan last night. Will attempt at another time will pt more appropriate for therapy.      Thank you,     Sunny Hogan OTR/L #[id number]

## 2019-06-14 NOTE — CARE COORDINATION
PT/OT recommending SNF level of care. Ezio spoke to patient's daughter Scotty [phone], she agreed that her father will need SNF. She plans on discussing this with him and stated that he will be agreeable. She reviewed SNF list and chose [facility] and [facility]. Referrals initiated. [facility] can accept, will have a bed available on Sunday. [facility] reviewing and checking their bed availability. HENS needed. Grayson Hernandez, [address] [facility]  [phone]  6/14/19 at 3:47 PM      [facility] also able to accept patient. Both [address] [place] [address] and [facility] have a private room. Ezio informed patient's daughter of this. She is not sure what her first preference would bee. She has weekend SW contact information if needed. Will need HENS.      Grayson Hernandez, [address] [facility]  [phone]  6/14/19 at 4:25 PM

## 2019-06-14 NOTE — PROGRESS NOTES
past surgical history that includes hernia repair; amputation; and Colonoscopy (05/02/2016). Restrictions  Restrictions/Precautions  Restrictions/Precautions: Fall Risk  Position Activity Restriction  Other position/activity restrictions: O2 NC   Vision/Hearing  Vision: Within Functional Limits  Hearing: Within functional limits     Subjective  General  Chart Reviewed: Yes  Patient assessed for rehabilitation services?: Yes  Additional Pertinent Hx: Per H&P note:  75 y.o. male who presented to [facility] [place] with shortness of breath. Patient has been poorly compliant with therapy at home. He was brought in the hospital with increasing dyspnea. Patient was noted to be in distress and was placed on BiPAP mode ventilation. he continues to smoke. He is oxygen dependent at home.   Patient is somnolent right now and is not a very good historian  Response To Previous Treatment: Not applicable  Family / Caregiver Present: No  Follows Commands: Within Functional Limits  Subjective  Subjective: pt agreeable to therapy; pt reports chronic pain  Pain Screening  Patient Currently in Pain: Yes          Orientation  Orientation  Overall Orientation Status: Within Functional Limits(partial place, time and situation)  Social/Functional History  Social/Functional History  Lives With: Family(sister)  Type of Home: House  Home Layout: Two level, Able to Live on Main level with bedroom/bathroom  Home Access: Stairs to enter with rails  Entrance Stairs - Number of Steps: 4  Bathroom Shower/Tub: Tub/Shower unit  Bathroom Equipment: Shower chair, Grab bars in [address] [facility] [address]: Oxygen(2-2.5L O2)  Receives Help From: Family  ADL Assistance: Independent  Homemaking Responsibilities: No  Ambulation Assistance: Independent(no AD)  Transfer Assistance: Independent  Active : Yes  Mode of Transportation: Car  Occupation: Retired  Type of occupation:    Leisure & Hobbies: Trap shooting  Cognition Objective          AROM RLE (degrees)  RLE AROM: WFL  AROM LLE (degrees)  LLE AROM : WFL  Strength RLE  Strength RLE: WFL  Strength LLE  Strength LLE: WFL     Sensation  Overall Sensation Status: WFL  Bed mobility  Supine to Sit: Stand by assistance  Transfers  Sit to Stand: Contact guard assistance;Minimal Assistance  Stand to sit: Contact guard assistance;Minimal Assistance  Ambulation  Ambulation?: Yes  Ambulation 1  Surface: level tile  Device: No Device  Assistance: Contact guard assistance;Minimal assistance  Quality of Gait: slightly unsteady with flexed posture; therapist managed high flow O2 line  Gait Deviations: Shuffles  Distance: 20'  Comments: limited in distance by fatigue and high flow O2 line     Balance  Comments: SBA for sitting balance; CGA/min A for standing        Plan   Plan  Times per week: 3-5  Current Treatment Recommendations: Functional Mobility Training  Safety Devices  Type of devices: Call light within reach, Chair alarm in place, Left in chair, Nurse notified    AM-PAC Score  AM-PAC Inpatient Mobility Raw Score : 17 (06/14/19 0827)  AM-PAC Inpatient T-Scale Score : 42.13 (06/14/19 0827)  Mobility Inpatient CMS 0-100% Score: 50.57 (06/14/19 0827)  Mobility Inpatient CMS G-Code Modifier : CK (06/14/19 0827)          Goals  Short term goals  Time Frame for Short term goals: by discharge  Short term goal 1: bed mob MI  Short term goal 2: transfers MI  Short term goal 3: amb 100' without AD SBA while maintaining O2 sats >90%  Short term goal 4: negotiate stairs when able  Patient Goals   Patient goals : to get stronger as pt in agreement that he is more unsteady than his usual state       Therapy Time   Individual Concurrent Group Co-treatment   Time In 0754         Time Out 0827         Minutes 33              If patient is discharged prior to the next physical therapy visit;  Please see last written PT note for discharge status.     Cee Alberts, PT, [phone]   CEE ALBERTS, PT

## 2019-06-14 NOTE — PROGRESS NOTES
Department of Internal Medicine  General Internal Medicine   Progress Note    CC: Acute exacerbation of COPD    SUBJECTIVE:    Pt uses 2.5 L nasal cannula at baseline. Per daughter, he is narcotic dependent for chronic pain and follows up with pain management. She is worried about him going into withdrawal. Pt feels better than yesterday but still sob. No cough.      OBJECTIVE      Medications    Current Facility-Administered Medications: acetaminophen (TYLENOL) tablet 650 mg, 650 mg, Oral, Q4H PRN  sodium chloride (OCEAN, BABY AYR) 0.65 % nasal spray 2 spray, 2 spray, Each Nostril, PRN  HYDROcodone-acetaminophen (NORCO) 5-325 MG per tablet 1 tablet, 1 tablet, Oral, TID  aspirin chewable tablet 81 mg, 81 mg, Oral, Daily  atorvastatin (LIPITOR) tablet 40 mg, 40 mg, Oral, Daily  diltiazem (CARDIZEM CD) extended release capsule 120 mg, 120 mg, Oral, Daily  DULoxetine (CYMBALTA) extended release capsule 60 mg, 60 mg, Oral, Daily  sodium chloride flush 0.9 % injection 10 mL, 10 mL, Intravenous, 2 times per day  sodium chloride flush 0.9 % injection 10 mL, 10 mL, Intravenous, PRN  magnesium hydroxide (MILK OF MAGNESIA) 400 MG/5ML suspension 30 mL, 30 mL, Oral, Daily PRN  ondansetron (ZOFRAN) injection 4 mg, 4 mg, Intravenous, Q6H PRN  enoxaparin (LOVENOX) injection 40 mg, 40 mg, Subcutaneous, Daily  ipratropium-albuterol (DUONEB) nebulizer solution 1 ampule, 1 ampule, Inhalation, Q4H WA  [COMPLETED] azithromycin (ZITHROMAX) tablet 500 mg, 500 mg, Oral, Daily **FOLLOWED BY** azithromycin (ZITHROMAX) tablet 250 mg, 250 mg, Oral, Daily  hydrALAZINE (APRESOLINE) injection 10 mg, 10 mg, Intravenous, Q6H PRN  budesonide (PULMICORT) nebulizer suspension 500 mcg, 0.5 mg, Nebulization, BID  formoterol (PERFOROMIST) nebulizer solution 20 mcg, 20 mcg, Nebulization, BID  ipratropium-albuterol (DUONEB) nebulizer solution 1 ampule, 1 ampule, Inhalation, Q4H PRN  methylPREDNISolone sodium (SOLU-MEDROL) injection 40 mg, 40 mg, Intravenous, Q6H  Physical    VITALS:  /80   Pulse 116   Temp 98.9 °F (37.2 °C) (Oral)   Resp 20   Ht 5' 7\" (1.702 m)   Wt 176 lb 5.9 oz (80 kg)   SpO2 97%   BMI 27.62 kg/m²     General appearance: Male on BiPAP he is arousable but falls back asleep. HEENT:  Normal cephalic, atraumatic without obvious deformity. Pupils equal, round, and reactive to light. Extra ocular muscles intact. Conjunctivae/corneas clear. Neck: Supple, with full range of motion. No jugular venous distention. Trachea midline. Respiratory: Decreased breath sounds bilaterally  Cardiovascular: Sinus tachycardia  Abdomen: Soft, non-tender, non-distended with normal bowel sounds. Musculoskeletal:  No clubbing, cyanosis or edema bilaterally. Full range of motion without deformity. Skin: Skin color, texture, turgor normal.  No rashes or lesions. Neurologic: Moves arms and legs spontaneously  Capillary Refill: Brisk,< 3 seconds   Peripheral Pulses: +2 palpable, equal bilaterally       ASSESSMENT AND PLAN         Acute on chronic hypercapnic respiratory failure-improved with BiPAP his PCO2 is come down. He has a compensatory respiratory acidosis.   Continue IV Solu-Medrol  Nebs every 4 hours plus every 2 as needed  Cont Zithromax for bronchitis  Pulmonary following      HTN-stable  Cont home diltiazem     Chronic pain  Norco ordered      Generalized weakness-PT OT ordered  Family interested in snf     DVT Prophylaxis: lovenox  Diet: DIET LOW SODIUM 2 GM;  Code Status: Full Code     Dispo - 2-3 days

## 2019-06-14 NOTE — PLAN OF CARE
Pain/discomfort being managed with PRN analgesics per MD orders. Pt able to express presence and absence of pain and rate pain appropriately using numerical scale. Skin assessment completed every shift. Pt assessed for incontinence, appropriate barrier cream applied prn. Pt encouraged to turn/rotate every 2 hours. Assistance provided if pt unable to do so themselves. Fall risk assessment completed every shift. All precautions in place. Pt has call light within reach at all times. Room clear of clutter. Pt aware to call for assistance when getting up.

## 2019-06-14 NOTE — PROGRESS NOTES
4 Eyes Skin Assessment     The patient is being assess for  Admission    I agree that 2 RN's have performed a thorough Head to Toe Skin Assessment on the patient. ALL assessment sites listed below have been assessed. Areas assessed by both nurses:  [x]   Head, Face, and Ears   [x]   Shoulders, Back, and Chest  [x]   Arms, Elbows, and Hands   [x]   Coccyx, Sacrum, and IschIum  [x]   Legs, Feet, and Heels        Does the Patient have Skin Breakdown?   No         Kennedy Prevention initiated:  Yes   Wound Care Orders initiated:  No      [facility] nurse consulted for Pressure Injury (Stage 3,4, Unstageable, DTI, NWPT, and Complex wounds), New and Established Ostomies:  No      Nurse 1 eSignature: Electronically signed by Wilson Smith RN on 6/14/19 at 1:45 AM    **SHARE this note so that the co-signing nurse is able to place an eSignature**    Nurse 2 eSignature: Electronically signed by Quan Royal RN on 6/14/19 at 4:43 AM

## 2019-06-14 NOTE — PROGRESS NOTES
(165-175 lb )  · Ideal Body Wt: 148 lb (67.1 kg),   · BMI Classification: BMI 25.0 - 29.9 Overweight    Nutrition Interventions:   Continue current diet, Start ONS  Continued Inpatient Monitoring    Nutrition Evaluation:   · Evaluation: Goals set   · Goals:  Tolerate diet and consume greater than 50% of meals and supplements this admission     · Monitoring: Meal Intake, Supplement Intake, Diet Tolerance, Weight      Electronically signed by Clair Lind RD, PARVEZ on 6/14/19 at 3:07 PM    Contact Number: [phone]

## 2019-06-14 NOTE — PROGRESS NOTES
Pulmonary Progress Note    CC: Acute exacerbation of COPD  Centrilobular emphysema  Acute on chronic hypercarbic respiratory failure  Acute on chronic hypoxic respiratory failure (on 2.5 L nasal cannula)    Subjective:  He is short of breath with minimal activity. He is on high flow oxygen. ROS:  +SOB  No cough  No vomiting     PHYSICAL EXAM:  Blood pressure (!) 146/68, pulse 72, temperature 98.9 °F (37.2 °C), temperature source Oral, resp. rate 22, height 5' 7\" (1.702 m), weight 176 lb 5.9 oz (80 kg), SpO2 95 %. on 5L HF NC    Intake/Output Summary (Last 24 hours) at 6/14/2019 1005  Last data filed at 6/14/2019 1003  Gross per 24 hour   Intake 1130 ml   Output 1875 ml   Net -745 ml       Gen: Well developed; well nourished  Eyes: No scleral icterus. No conjunctival injection. ENT: External appearance of ears and nose normal.  Neck: Trachea midline. No obvious mass. No visible thyroid enlargement    Respiratory: Diffusely decreased breath sounds with faint expiratory wheezes, no accessory muscle use  Cardiovascular: Regular rate and rhythm, no appreciable murmurs. Bilateral lower extremity edema. Skin: Warm and dry. No rashes or ulcers on visible areas. Normal texture and turgor  Musculoskeletal: No cyanosis, clubbing or joint deformity.     Psychiatric: Normal mood and affect; exhibits normal diminished and judgement       Medications:  Current Facility-Administered Medications: acetaminophen (TYLENOL) tablet 650 mg, 650 mg, Oral, Q4H PRN  sodium chloride (OCEAN, BABY AYR) 0.65 % nasal spray 2 spray, 2 spray, Each Nostril, PRN  aspirin chewable tablet 81 mg, 81 mg, Oral, Daily  atorvastatin (LIPITOR) tablet 40 mg, 40 mg, Oral, Daily  diltiazem (CARDIZEM CD) extended release capsule 120 mg, 120 mg, Oral, Daily  DULoxetine (CYMBALTA) extended release capsule 60 mg, 60 mg, Oral, Daily  sodium chloride flush 0.9 % injection 10 mL, 10 mL, Intravenous, 2 times per day  sodium chloride flush 0.9 % injection 10 mL, 10 mL, Intravenous, PRN  magnesium hydroxide (MILK OF MAGNESIA) 400 MG/5ML suspension 30 mL, 30 mL, Oral, Daily PRN  ondansetron (ZOFRAN) injection 4 mg, 4 mg, Intravenous, Q6H PRN  enoxaparin (LOVENOX) injection 40 mg, 40 mg, Subcutaneous, Daily  ipratropium-albuterol (DUONEB) nebulizer solution 1 ampule, 1 ampule, Inhalation, Q4H WA  [COMPLETED] azithromycin (ZITHROMAX) tablet 500 mg, 500 mg, Oral, Daily **FOLLOWED BY** azithromycin (ZITHROMAX) tablet 250 mg, 250 mg, Oral, Daily  hydrALAZINE (APRESOLINE) injection 10 mg, 10 mg, Intravenous, Q6H PRN  budesonide (PULMICORT) nebulizer suspension 500 mcg, 0.5 mg, Nebulization, BID  formoterol (PERFOROMIST) nebulizer solution 20 mcg, 20 mcg, Nebulization, BID  ipratropium-albuterol (DUONEB) nebulizer solution 1 ampule, 1 ampule, Inhalation, Q4H PRN  methylPREDNISolone sodium (SOLU-MEDROL) injection 40 mg, 40 mg, Intravenous, Q6H  HYDROcodone-acetaminophen (NORCO) 5-325 MG per tablet 1 tablet, 1 tablet, Oral, Q6H PRN    Data reviewed:  Labs:  CBC:   Recent Labs     06/13/19  0019 06/14/19  0438   WBC 10.6 7.3   HGB 12.9* 11.0*   HCT 39.1* 32.3*   .8* 102.1*    185     BMP:   Recent Labs     06/13/19  0019      K 4.1   CL 88*   CO2 40*   BUN 19   CREATININE 0.8     LIVER PROFILE:   Recent Labs     06/13/19  0019   AST 24   ALT 13   BILIDIR <0.2   BILITOT 0.3   ALKPHOS 74     PT/INR: No results for input(s): PROTIME, INR in the last 72 hours. APTT: No results for input(s): APTT in the last 72 hours. Films:  Chest images and reports were reviewed by me.  My interpretation is:  No new images      Assessment:     Acute exacerbation of COPD  Centrilobular emphysema  Acute on chronic hypercarbic respiratory failure  Acute on chronic hypoxic respiratory failure (on 2.5 L nasal cannula)      Plan:    Acute exacerbation of COPD  -Continue Solu-Medrol 40 mg IV every 6 hours  -Azithromycin (day #2)  -Duonebs every 4 hours     Centrilobular emphysema  -Budesonide and formoterol nebulizers  -Duonebs every 4 hours     Acute on chronic hypercarbic respiratory failure  -PCO2 has normalized     Acute on chronic hypoxic respiratory failure (on 2.5 L nasal cannula)  -Wean supplemental oxygen as able to keep saturation greater than 90%  -Give lasix 40mg IV x1        Thank you for allowing me to participate in the care of this patient. Will follow.      Alesia Tran MD  [facility] Pulmonary, Critical Care and Sleep

## 2019-06-14 NOTE — PROGRESS NOTES
Occupational Therapy   Occupational Therapy Initial Assessment and Daily Txt Note  Date: 2019   Patient Name: Fiordaliza Ram  MRN: [id number]     : 1945    Date of Service: 2019    Discharge Recommendations:Dez Wynn scored a 17/24 on the  [address] form. Current research shows that an AM-PAC score of 17 or less is typically not associated with a discharge to the patient's home setting. Based on the patients AM-PAC score and their current ADL deficits, it is recommended that the patient have 3-5 sessions per week of Occupational Therapy at d/c to increase the patients independence. 3-5 sessions per week  OT Equipment Recommendations  Other: TBD    Assessment   Performance deficits / Impairments: Decreased ADL status; Decreased functional mobility ; Decreased endurance;Decreased balance  Assessment: Patient is a 75 y.o. male who presents d/t SOB; admitted for management of Acute on chronic hypercapnic respiratory failure. PTA pt was independent with fxl mobility and ADLs. Currently, pt is functioning below baseline secondary to decreased endurance and balance during session. Pt would benefit from con't therapy at d/c to assist pt back to baseline and ensure pt safety. Treatment Diagnosis: Decreased fxl mobility; decreased ADLs  Prognosis: Good  Decision Making: Medium Complexity  History: See above  Exam: ADLs, transfers  Assistance / Modification: min/CGA for fxl mobility; CGA-mod A likly for ADLs  Patient Education: Role of OT, d/c planning, safety, use of call light  Barriers to Learning: Mild confusion  REQUIRES OT FOLLOW UP: Yes  Activity Tolerance  Activity Tolerance: Patient limited by fatigue  Activity Tolerance: Pt able to maintain SpO2 stats above 96% on 5L O2. Safety Devices  Safety Devices in place: Yes  Type of devices: Left in chair;Nurse notified;Call light within reach; Chair alarm in place           Patient Diagnosis(es): The primary encounter diagnosis was COPD exacerbation ([facility] [address] A diagnosis of Hypercapnia was also pertinent to this visit. has a past medical history of Amputation finger, Ankle fracture, left, Chronic nasal congestion, Chronic pain syndrome, Compression fracture of lumbar vertebra (HCC), COPD (chronic obstructive pulmonary disease) ([facility] [address] DDD (degenerative disc disease), Depression, Facet joint disease of lumbosacral region, Hyperglycemia, Hyperlipidemia, Insomnia, Radiculopathy, and Smoker's respiratory syndrome. has a past surgical history that includes hernia repair; amputation; and Colonoscopy (05/02/2016). Treatment Diagnosis: Decreased fxl mobility; decreased ADLs      Restrictions  Restrictions/Precautions  Restrictions/Precautions: Fall Risk  Position Activity Restriction  Other position/activity restrictions: O2 NC     Subjective   General  Chart Reviewed: Yes  Patient assessed for rehabilitation services?: Yes  Additional Pertinent Hx: Patient is a 75 y.o. male who presents d/t SOB; admitted for management of Acute on chronic hypercapnic respiratory failure. Family / Caregiver Present: No  Referring Practitioner: Linette Kay MD  Subjective  Subjective: Patient presents in the bed and agreeable to OT eval. C/o chronic back pain.       Social/Functional History  Social/Functional History  Lives With: Family(sister)  Type of Home: House  Home Layout: Two level, Able to Live on Main level with bedroom/bathroom  Home Access: Stairs to enter with rails  Entrance Stairs - Number of Steps: 4  Bathroom Shower/Tub: Tub/Shower unit  Bathroom Equipment: Shower chair, Grab bars in shower  Home Equipment: Oxygen(2-2.5L O2)  Receives Help From: Family  ADL Assistance: Independent  Homemaking Responsibilities: No  Ambulation Assistance: Independent(no AD)  Transfer Assistance: Independent  Active : Yes  Mode of Transportation: Car  Occupation: Retired  Type of occupation:    Leisure & Hobbies: Trap shooting       Objective Vision: Impaired  Vision Exceptions: (has glasses but did not wear during session)  Hearing: Exceptions to [facility]  Hearing Exceptions: Hard of hearing/hearing concerns    Orientation  Overall Orientation Status: Impaired  Orientation Level: Oriented to person(partial place, time, and situtation. )     Balance  Sitting Balance: Stand by assistance  Standing Balance: Minimal assistance(/CGA)  Functional Mobility  Functional - Mobility Device: No device  Activity: Other  Assist Level: Minimal assistance(/CGA )  Functional Mobility Comments: Fxl mobility in room of 20 ft, mildly unsteady, intermittent HHA. Therapist managed O2 line. ADL  Feeding: Setup  LE Dressing: Contact guard assistance(donning socks seated EOB )  Additional Comments: Patient will likely require up to min A for toileting, min A for bathing, CGA for grooming, and SBA for UE dressing. Bed mobility  Supine to Sit: Stand by assistance  Transfers  Sit to stand: Minimal assistance;Contact guard assistance  Stand to sit: Minimal assistance;Contact guard assistance  Transfer Comments: completed twice from EOB. Leans posteriorly from EOB > stance. Cognition  Overall Cognitive Status: Exceptions  Arousal/Alertness: Appropriate responses to stimuli  Following Commands: Follows one step commands with increased time; Follows one step commands with repetition  Attention Span: Appears intact  Memory: Decreased recall of recent events  Safety Judgement: Decreased awareness of need for safety  Insights: Decreased awareness of deficits        Sensation  Overall Sensation Status: WFL        LUE AROM (degrees)  LUE AROM : WFL  Left Hand AROM (degrees)  Left Hand AROM: WFL  RUE AROM (degrees)  RUE AROM : WFL  Right Hand AROM (degrees)  Right Hand AROM: WFL  LUE Strength  Gross LUE Strength: WFL  RUE Strength  Gross RUE Strength: WFL         Plan   Plan  Times per week: 3-5  Times per day: Daily  Current Treatment Recommendations: Strengthening, ROM, Balance

## 2019-06-14 NOTE — PLAN OF CARE
Problem: Falls - Risk of:  Goal: Will remain free from falls  Description  Will remain free from falls  Outcome: Ongoing  Goal: Absence of physical injury  Description  Absence of physical injury  Outcome: Ongoing     Problem: Pain:  Goal: Pain level will decrease  Description  Pain level will decrease  Outcome: Ongoing

## 2019-06-15 NOTE — PROGRESS NOTES
Pulmonary Progress Note    Date of Admission: 6/12/2019   LOS: 2 days       CC:  COPD with exacerbation    Subjective:  Feeling sniffily better today. Refusing BiPAP. Like to go home today. Feels like he is back to his baseline walking to the bathroom and back. Would like to be started with a pulmonologist.    ROS:    No nausea  No Vomiting  No chest pain        PHYSICAL EXAM:    Blood pressure (!) 152/85, pulse 105, temperature 97.7 °F (36.5 °C), temperature source Oral, resp. rate 17, height 5' 7\" (1.702 m), weight 175 lb 14.8 oz (79.8 kg), SpO2 96 %.'  Gen: No distress. Eyes: PERRL. No sclera icterus. No conjunctival injection. ENT: No discharge. Pharynx clear. External appearance of ears and nose normal.  Neck: Trachea midline. No obvious mass. Resp: No accessory muscle use. No crackles. No wheezes. No rhonchi. CV: Regular rate. Regular rhythm. No murmur or rub. No edema. GI: Non-tender. Non-distended. No hernia. Skin: Warm, dry, normal texture and turgor. No nodule on exposed extremities. Lymph: No cervical LAD. No supraclavicular LAD. M/S: No cyanosis. No clubbing. No joint deformity. Neuro: Moves all four extremities. Psych: Oriented x 3. No anxiety. Awake. Alert. Intact judgement and insight.       Medications:    Scheduled Meds:   HYDROcodone 5 mg - acetaminophen  1 tablet Oral TID    aspirin  81 mg Oral Daily    atorvastatin  40 mg Oral Daily    diltiazem  120 mg Oral Daily    DULoxetine  60 mg Oral Daily    sodium chloride flush  10 mL Intravenous 2 times per day    enoxaparin  40 mg Subcutaneous Daily    ipratropium-albuterol  1 ampule Inhalation Q4H WA    azithromycin  250 mg Oral Daily    budesonide  0.5 mg Nebulization BID    formoterol  20 mcg Nebulization BID    methylPREDNISolone  40 mg Intravenous Q6H       Continuous Infusions:      PRN Meds:  diphenhydrAMINE, acetaminophen, sodium chloride, sodium chloride flush, magnesium hydroxide, ondansetron, hydrALAZINE, ipratropium-albuterol    Labs reviewed:  CBC:   Recent Labs     06/13/19  0019 06/14/19  0438   WBC 10.6 7.3   HGB 12.9* 11.0*   HCT 39.1* 32.3*   .8* 102.1*    185     BMP:   Recent Labs     06/13/19  0019      K 4.1   CL 88*   CO2 40*   BUN 19   CREATININE 0.8     LIVER PROFILE:   Recent Labs     06/13/19  0019   AST 24   ALT 13   BILIDIR <0.2   BILITOT 0.3   ALKPHOS 74     PT/INR: No results for input(s): PROTIME, INR in the last 72 hours. APTT: No results for input(s): APTT in the last 72 hours. UA:  Recent Labs     06/13/19  1100   COLORU YELLOW   PHUR 7.0   CLARITYU Clear   SPECGRAV 1.016   LEUKOCYTESUR Negative   UROBILINOGEN 1.0   BILIRUBINUR Negative   BLOODU Negative   GLUCOSEU Negative     No results for input(s): PH, PCO2, PO2 in the last 72 hours. Cx:      Films:  Radiology Review:  Pertinent images / reports were reviewed as a part of this visit. CT Chest w/ contrast:   Results for orders placed during the hospital encounter of 05/17/13   CT Chest W Contrast    Narrative    CT SCAN OF THE CHEST WITH CONTRAST, MAY 17, 2013     INDICATION- Chest pain with dyspnea. COMPARISON- Report of a chest CT of 2001 is available but images  are not available on PACS for direct comparison. TECHNICAL- Spiral imaging was performed from the apex of the lung  to the diaphragm with nonionic intravenous contrast.     FINDING-      Lung windows reveal mild hyperinflation and scattered parenchymal  calcified granuloma. No suspicious pulmonary nodule or mass  identified. No focal alveolar infiltrate or consolidation to  suggest pneumonia. No pleural effusion or pneumothorax. Mediastinal windows reveal no pathologically enlarged mediastinal  or hilar lymphadenopathy. No cardiac enlargement or pericardial  effusion. No aortic aneurysm or evidence for dissection. Images through the diaphragm and upper abdomen reveal normal  adrenal glands.  There are no discrete lesions within the superior  orifice of the liver, pancreas, or superior half of the kidneys. Numerous calcified granuloma noted within the spleen. Bone windows reveal 40% compression fracture of a single lower  thoracic vertebral body at approximately the T11 or T12 level. Mild compression fractures are noted at two consecutive levels  within the mid thoracic spine at approximately T6 and T7. Age of  these fractures are indeterminate but could be acute or subacute. IMPRESSION-      Evidence of old granulomatous disease. No acute pulmonary finding. No suspicion for malignancy within the chest.     At least three compression fractures within the thoracic spine as  noted. Age and etiology are indeterminate. Possibilities would  include trauma, osteoporosis, or underlying bone pathology. Patient has a scheduled bone scan later the same day. Please refer  to that report for its findings. If the above results differ significantly from the clinical  impression, further evaluation may be warranted. Dictated on- 05/17/2013 11-26-21          Electronically Read By- Jonathon Meyers M.D. Electronically Released By- Jonathon Meyers M.D. Released Date Time- 05/17/13 [address]   ------------------------------------------------------------------------------       CT Chest w/o contrast: No results found for this or any previous visit. CTPA: No results found for this or any previous visit.     CXR PA/LAT:   Results for orders placed during the hospital encounter of 06/12/19   XR CHEST STANDARD (2 VW)    Narrative EXAMINATION:  TWO XRAY VIEWS OF THE CHEST    6/13/2019 12:24 am    COMPARISON:  05/04/2019 and 07/21/2018    HISTORY:  ORDERING SYSTEM PROVIDED HISTORY: sob  TECHNOLOGIST PROVIDED HISTORY:  Reason for exam:->sob  Ordering Physician Provided Reason for Exam: sob  Acuity: Acute  Type of Exam: Initial    FINDINGS:  The lungs are hyperexpanded without acute infiltrate. The heart size is  normal.  The aorta is tortuous. There is no large pleural effusion or  evidence for pneumothorax. Multiple thoracic compression fractures are again  seen. Impression No acute disease. CXR portable:   Results for orders placed during the hospital encounter of 05/04/19   XR CHEST PORTABLE    Narrative EXAMINATION:  SINGLE XRAY VIEW OF THE CHEST    5/4/2019 9:04 am    COMPARISON:  April 18, 2018    HISTORY:  ORDERING SYSTEM PROVIDED HISTORY: dyspnea, PNA concern  TECHNOLOGIST PROVIDED HISTORY:  Reason for exam:->dyspnea, PNA concern  Ordering Physician Provided Reason for Exam: dyspnea, PNA concern  Acuity: Acute  Type of Exam: Initial    FINDINGS:  The lungs are without acute focal process. There is no effusion or  pneumothorax. The cardiomediastinal silhouette is without acute process. The  osseous structures are without acute process. Impression No acute process. Assessment:       COPD with acute exacerbation  Emphysema  Acute on chronic hypoxemic respiratory failure, baseline 2.5 L nasal cannula  Acute on chronic hypercapnic respiratory failure    Plan:      Acute on chronic hypercapnic respiratory failure  -Repeat arterial blood gas yesterday with improved pH. Refusing BiPAP at this time. Continue to monitor off BiPAP. COPD with exacerbation  -Budesonide, formoterol nebulizer  -Duo nebs  -Solu-Medrol 40 every 6  -He has been back to baseline would like to be discharged today. He would like to be established with a pulmonologist.  -Okay to discharge today. Will discharge with prednisone at 40 mg for 12 days. Future Appointments   Date Time Provider Joanne Kessler   7/2/2019  1:20 PM Bev Stanley MD [facility] PULM [facility]   7/8/2019  4:40 PM Jo[facility]Ann Patel MD [facility]             Acute hypoxemia  -Decrease to baseline. This note was transcribed using [id number] [employer].  Please disregard any translational errors.       Haley Martinez Pulmonary, Sleep and [facility] [phone]

## 2019-06-15 NOTE — PLAN OF CARE
Problem: Falls - Risk of:  Goal: Will remain free from falls  Description  Will remain free from falls  6/15/2019 1050 by Holly Raphael RN  Outcome: Ongoing  Note:   Bed alarm kept on. Call light in reach. Side rails up x2. Pt reminded to use call light for assistance getting out of bed. Hourly rounding done to anticipate pt needs. Problem: Risk for Impaired Skin Integrity  Goal: Tissue integrity - skin and mucous membranes  Description  Structural intactness and normal physiological function of skin and  mucous membranes. 6/15/2019 1050 by Holly Raphael RN  Outcome: Ongoing  Note:   Skin assessment completed every shift. Pt assessed for incontinence, appropriate barrier cream applied prn. Pt encouraged to turn/rotate every 2 hours. Assistance provided if pt unable to do so themselves. Problem: Pain:  Goal: Pain level will decrease  Description  Pain level will decrease  6/15/2019 1050 by Holly Raphael RN  Outcome: Ongoing  Note:   Pain/discomfort being managed with PRN analgesics per MD orders. Pt able to express presence and absence of pain and rate pain appropriately using numerical scale.

## 2019-06-15 NOTE — PROGRESS NOTES
Patient refusing BiPAP at this time. Will monitor.     Electronically signed by Morris Frausto RCP on 6/15/2019 at 12:48 AM

## 2019-06-15 NOTE — DISCHARGE INSTR - COC
Continuity of Care Form    Patient Name: Jonathan Alanis   :    MRN:  [id number]    Admit date:  2019  Discharge date:  6/15/19    Code Status Order: Full Code   Advance Directives:   Advance Care Flowsheet Documentation     Date/Time Healthcare Directive Type of Healthcare Directive Copy in [address] Agent's Name Healthcare Agent's Phone Number    19 1412  -- AD's provided/explained to Patient-C/MF  -- -- -- -- --    19  No, patient does not have an advance directive for healthcare treatment -- -- -- -- --          Admitting Physician:  Park Caro MD  PCP: Miky Sousa MD    Discharging Nurse: Carolyne Serrano Unit/Room#: G4W-5084/6020-04  Discharging Unit Phone Number: [phone]    Emergency Contact:   Extended Emergency Contact Information  Primary Emergency Contact: Raj Taveras Phone: [phone]  Relation: Child    Past Surgical History:  Past Surgical History:   Procedure Laterality Date    AMPUTATION      COLONOSCOPY  2016    colonic polyps    HERNIA REPAIR         Immunization History:   Immunization History   Administered Date(s) Administered    Influenza Virus Vaccine 10/15/2012, 2013, 2014, 2015    Influenza, Sergio November, 3 Years and older, IM (Fluzone 3 yrs and older or Afluria 5 yrs and older) 2017, 10/13/2017, 2018    Pneumococcal 13-valent Conjugate (Laverda Balder) 2014, 2015, 2017    Pneumococcal Polysaccharide ([id number]) 10/11/2016    Tdap (Boostrix, Adacel) 2007       Active Problems:  Patient Active Problem List   Diagnosis Code    OA (osteoarthritis) M19.90    Hyperglycemia R73.9    Chronic pain syndrome G89.4    Chronic nasal congestion R09.81    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    Smoker's respiratory syndrome F17.200    DDD (degenerative disc disease) [id number]    Ankle fracture, left J38.914Z    Compression fracture of lumbar Facility/ Agency   · Name:    [address]  · Address:  · Phone:    [phone]  · Fax:        [phone]      / signature: Mary He     Case Management   [phone]    6/15/2019  1:10 PM      PHYSICIAN SECTION    Prognosis: Fair    Condition at Discharge: Stable    Rehab Potential (if transferring to Rehab): Good    Recommended Labs or Other Treatments After Discharge: none    Physician Certification: I certify the above information and transfer of Cristóbal Fernandes  is necessary for the continuing treatment of the diagnosis listed and that he requires Home Care for greater 30 days.      Update Admission H&P: No change in H&P    PHYSICIAN SIGNATURE:  Electronically signed by Mayra Anthony MD on 6/15/19 at 12:35 PM

## 2019-06-15 NOTE — CARE COORDINATION
LULAW returned to the room to discuss dc for today. He has not spoken to his dgtr yet but he reports she will pick him up today. He does not know the time. Discussion held regarding need for portable oxygen. He states daughter has it in her car. LULAW informed him she did not answer the phone when called. He states he talked to her this morning.   Sandy LevelMariya Arcadia     Case Management   [phone]    6/15/2019  1:42 PM

## 2019-06-15 NOTE — PLAN OF CARE
Problem: Falls - Risk of:  Goal: Will remain free from falls  Description  Will remain free from falls  6/14/2019 2331 by Verna Christie RN  Outcome: Ongoing     Problem: Falls - Risk of:  Goal: Absence of physical injury  Description  Absence of physical injury  6/14/2019 2331 by Verna Christie RN  Outcome: Ongoing     Problem: Risk for Impaired Skin Integrity  Goal: Tissue integrity - skin and mucous membranes  Description  Structural intactness and normal physiological function of skin and  mucous membranes. 6/14/2019 2331 by Verna Christie RN  Outcome: Ongoing     Problem: Pain:  Description  Pain management should include both nonpharmacologic and pharmacologic interventions. Goal: Pain level will decrease  Description  Pain level will decrease  6/14/2019 2331 by Verna Christie RN  Outcome: Ongoing    Pt provided with scheduled pain meds to keep his pain at his tolerable level. Emotional support provided and pt encouraged to reposition frequently to help his pain. Problem: Pain:  Description  Pain management should include both nonpharmacologic and pharmacologic interventions. Goal: Control of acute pain  Description  Control of acute pain  6/14/2019 2331 by Verna Christie RN  Outcome: Ongoing     Problem: Pain:  Description  Pain management should include both nonpharmacologic and pharmacologic interventions.   Goal: Control of chronic pain  Description  Control of chronic pain  6/14/2019 2331 by Verna Christie RN  Outcome: Ongoing     Problem: Nutrition  Goal: Optimal nutrition therapy  6/14/2019 2331 by eVrna Christie RN  Outcome: Ongoing

## 2019-06-15 NOTE — DISCHARGE SUMMARY
distances. His breathing improves with rest.  He denies cough or sputum production. He denies sick contacts. In the ER he was found to be in respiratory distress and was placed on BiPAP. ABG showed acute on chronic CO2 retention. He reports that he uses Symbicort and Harris Alias as well as albuterol inhaler as needed. He also has nebulizers, but does not usually use them. He uses 2.5 L of oxygen continuously. He smokes 1 pack of cigarettes per day.         Hospital Course:    Patient was admitted to the hospital and then started on supportive care with improvement in symptoms and please see below for further information. Acute on chronic hypercapnic respiratory failure  -Repeat arterial blood gas with improved pH. Refusing BiPAP at this time. Cleared for discharge by Pulm. Home with steroids taper and antibiotics.      COPD with exacerbation  -Home with antibiotics and steroids         Failure to Thrive: Attributed to deconditioning and unstable on feet. -Refused placement   -Home health           Discharge Physical Exam:  Vitals: /69   Pulse 114   Temp 98 °F (36.7 °C) (Oral)   Resp 20   Ht 5' 7\" (1.702 m)   Wt 175 lb 14.8 oz (79.8 kg)   SpO2 95%   BMI 27.55 kg/m²   General appearance: Laying in bed  Skin: Warm  HEENT: Head: Normocephalic, no lesions, without obvious abnormality. Neck: no adenopathy, no carotid bruit, no JVD, supple, symmetrical, trachea midline and thyroid not enlarged, symmetric, no tenderness/mass/nodules  Lungs: diminished breath sounds bilaterally  Heart: regular rate and rhythm  Abdomen: soft, non-tender; bowel sounds normal; no masses,  no organomegaly  Extremities: extremities normal, atraumatic, no cyanosis or edema  Neurologic: Mental status: Alert, oriented, thought content appropriate        Labs: For convenience and continuity at follow-up the following most recent labs are provided:       All labs past 24 hours:   Recent Results (from the past 24 hour(s)) Troponin    Collection Time: 06/14/19  5:50 PM   Result Value Ref Range    Troponin <0.01 <0.01 ng/mL   Troponin    Collection Time: 06/14/19 10:48 PM   Result Value Ref Range    Troponin <0.01 <0.01 ng/mL         Discharge Medications:    See medication reconciliation under discharge instructions. Raimundo Prasad   Home Medication Instructions M:[id number]    Printed on:06/15/19 1236   Medication Information                      aspirin 81 MG tablet  Take 81 mg by mouth daily             atorvastatin (LIPITOR) 40 MG tablet  TAKE 1 TABLET BY MOUTH DAILY             azithromycin (ZITHROMAX) 250 MG tablet  Take 1 tablet by mouth See Admin Instructions for 5 days 500mg on day 1 followed by 250mg on days 2 - 5             diltiazem (TIAZAC) 120 MG extended release capsule  Take 120 mg by mouth daily             DULoxetine (CYMBALTA) 60 MG extended release capsule  Take 1 capsule by mouth daily             fluticasone (FLONASE) 50 MCG/ACT nasal spray  USE 2 SPRAYS IN EACH NOSTRIL DAILY             furosemide (LASIX) 40 MG tablet  1 tab daily as needed for swelling             ipratropium-albuterol (DUONEB) 0.5-2.5 (3) MG/3ML SOLN nebulizer solution  Use qid             oxyCODONE (OXYCONTIN) 10 MG extended release tablet  Take 10 mg by mouth every 12 hours. oxyCODONE-acetaminophen (PERCOCET)  MG per tablet  Take 1 tablet by mouth every 6 hours as needed for Pain.              potassium chloride (KLOR-CON M) 20 MEQ extended release tablet  Take 1 tablet by mouth daily             potassium chloride (KLOR-CON M) 20 MEQ extended release tablet  Take 1 tablet by mouth daily             predniSONE (DELTASONE) 10 MG tablet  Take 4 tablets by mouth daily for 3 days, THEN 3 tablets daily for 3 days, THEN 2 tablets daily for 3 days, THEN 1 tablet daily for 3 days.              PROAIR  (90 Base) MCG/ACT inhaler  INHALE 2 PUFFS BY MOUTH 4 TIMES DAILY WHEN AWAY FROM HOME             Respiratory Therapy

## 2019-06-15 NOTE — PROGRESS NOTES
Iv and tele d/cd. Discharge instructions given to pt and daughter, verbalized understanding. Belongings gathered per pt.  Pt taken out via wheelchair and daughter to drive pt home  Electronically signed by Rick Rogers RN on 6/15/2019 at 2:38 PM

## 2019-06-16 NOTE — CARE COORDINATION
Agn[facility] Transitions Initial Follow Up Call    Call within 2 business days of discharge: Yes    Patient: Gregorio Falk Patient :    MRN: [id number]  Reason for Admission: aeCOPD  Discharge Date: 6/15/19 RARS: Readmission Risk Score: 20      Last Discharge [facility]       Complaint Diagnosis Description Type Department Provider    19 Shortness of Breath; Chest Pain COPD exacerbation ([facility] [address] . .. ED to Hosp-Admission (Discharged) (ADMITTED) Sanjeev Colvin MD; Mendez Jones MD;... Spoke with: Wendy Cortés (patient)    Facility: [place]    Non-face-to-face services provided:  Obtained and reviewed discharge summary and/or continuity of care documents  Education of patient/family/caregiver/guardian to support self-management-s/s to report  Assessment and support for treatment adherence and medication management-med review    Care Transitions 24 Hour Call    Schedule Follow Up Appointment with PCP:  Completed  Do you have any ongoing symptoms?:  No  Do you have a copy of your discharge instructions?:  Yes  Do you have all of your prescriptions and are they filled?:  Yes (Comment: sister helps him with his meds)  Have you been contacted by a [address]?:  No  Have you scheduled your follow up appointment?:  Yes  How are you going to get to your appointment?:  Car - family or friend to transport  Were you discharged with any Home Care or Post Acute Services:  Yes  Post Acute Services:  Home Health (Comment: [facility])  Patient DME:  Shower chair  Patient Home Equipment:  Oxygen, Nebulizer  Do you have support at home?:  Other Caregiver  Do you feel like you have everything you need to keep you well at home?:  Yes  Are you an active caregiver in your home?:  No  Care Transitions Interventions  No Identified Needs       10am - sister reports Manuela Wolfe is in shower. Requests writer call back later. 12pm - called back and spoke with patient.  Reports he feels well, taking new meds as prescribed. Declined full med review. States he is busy right now and this is not a good time to talk. Asks for follow up call another day. States [facility] called to schedule resumption and they also called at a bad time. Instructed him to call them in the morning to restart his services. He verbalizes understanding.      Follow Up  Future Appointments   Date Time Provider Joanne Kessler   7/2/2019  1:20 PM Dali Holder [facility]   7/8/2019  4:40 PM MD Dale Larios [facility]       Anna Russell RN

## 2019-06-20 NOTE — CARE COORDINATION
Agnes 45 Transitions Follow Up Call    2019    Patient: Lawrence Room  Patient :    MRN: <>  Reason for Admission: COPD  Discharge Date: 6/15/19 RARS: Readmission Risk Score: 20         Care Transitions Subsequent and Final Call    Subsequent and Final Calls  Care Transitions Interventions  Other Interventions: Follow Up: Follow up outreach call attempt, no answer. CTC left VM with contact information and request for return call. CTC will continue with outreach call attempts.     Future Appointments   Date Time Provider Joanne Kessler   2019  1:20 PM Yazmin Laws MD [facility]   2019  1:00 PM [facility]fr[facility]       [place] RN

## 2019-06-23 NOTE — ED NOTES
Bed: 05  Expected date:   Expected time:   Means of arrival:   Comments:  Rogelio Montaño  06/23/19 1923

## 2019-06-24 PROBLEM — F17.200 SMOKER: Status: ACTIVE | Noted: 2019-01-01

## 2019-06-24 NOTE — PROGRESS NOTES
The patient is refusing to take any HHN at this time but he will take an Albuterol mdi. RN notifed and Albuterol mdi given at this time. Patient states HHN makes him feel smothered and he cant do them.

## 2019-06-24 NOTE — ED NOTES
Buffy from lab calls with panic PCO2 of 75.8, Dr Carrie Dill and and Anastasia Loya RN made aware of sme     Jarad Solano RN  06/24/19 5163

## 2019-06-24 NOTE — PLAN OF CARE
Patient alert, oriented. Aware of own limitations. Resting quietly in bed. Belongings and call light within reach. Fall precautions in place. No falls this shift.

## 2019-06-24 NOTE — ED NOTES
Patients family at bedside speaking with James Mensah at this time.       Karri Zuñiga RN  06/24/19 5280

## 2019-06-24 NOTE — CARE COORDINATION
in your home?:  No  Social Support  Do you have a ?:  No  Do you have a [facility]?:  No  Durable Medical Equipment  Patient DME:  Shower chair  Patient Home Equipment:  Oxygen, Nebulizer  Functional Review  Ability to seek help/take action for Emergent/Urgent situations i.e. fire, crime, inclement weather or health crisis. :  Independent  Ability handle personal hygiene needs (bathing/dressing/grooming):  Needs Assistance  Ability to manage medications:  Needs Assistance  Ability to prepare food:  Needs Assistance  Ability to maintain home (clean home, laundry):  Dependent  Ability to drive and/or has transportation:  Independent  Ability to do shopping:  Needs Assistance  Is patient able to live independently?:  No  Hearing and Vision  Visual Impairment:  Visual impairment (Glasses/contacts)  Hearing Impairment:  Hard of hearing  Care Transitions Interventions   Medication Assistance Program:  Completed          Readmission from home with [facility]. Most recently admitted to [facility]. SNF was recommended and patient chose to DC to home where he lives with his sister. Sister assists with meal prep, housekeeping. They order groceries online and pick them up and put them away together. Patient drives. Has pulse oximeter and scale. Weighs daily. Has nebulizer, but doesn't like to use it. States just getting it out gives him severe anxiety. He can tolerate nasal canula and inhalers, but not the nebulizer. Does not see a pulmonologist, but states he had discussion today with Dr Carlitos Mendoza while he was rounding that he will establish with him after discharge. Provided patient with COPD Zone Tool and educated him on s/s to report. Provided him with Dr Heath office number. States he has trouble affording [place] [employer] and Symbicort. Says he takes them as prescribed. He is unsure if he wants to fill out Patient Assistance Program application.  Provided him with [employer] PAP application for him to review the required documentation needed. Encouraged him to discuss plan with Dr Julian Herron when he establishes as pulm may change his medications to manage his COPD better. He verbalizes understanding. Care transition following. Notified [address] liaison of patient's readmission with aeCOPD.      Follow Up  Future Appointments   Date Time Provider Joanne Kessler   7/2/2019  1:20 PM [facility]ima Abbasi [facility]   7/30/2019  1:00 PM Peewee Price MD [facility]       Health Maintenance  Health Maintenance Due   Topic Date Due    A1C test (Diabetic or Prediabetic)  05/23/2019       Roz Treviño RN

## 2019-06-24 NOTE — H&P
Hospital Medicine History & Physical      PCP: Letitia Grey MD    Date of Admission: 6/23/2019    Date of Service: Pt seen/examined on 6/24 and Admitted to Inpatient     Chief Complaint:  Sob       History Of Present Illness: The patient is a 75 y.o. male who presents to [facility] with acute onset and progressive course of sob. Sob started yesterday sob worse with exertion no relieving factors sob associated with cough no fever chills cp leg swelling hx of similar episode in past     Past Medical History:        Diagnosis Date    Amputation finger     Ankle fracture, left     Chronic nasal congestion     Chronic pain syndrome     Compression fracture of lumbar vertebra (HCC)     COPD (chronic obstructive pulmonary disease) (HCC)     DDD (degenerative disc disease)     Depression     Facet joint disease of lumbosacral region     Hyperglycemia     Hyperlipidemia     Insomnia     Radiculopathy     Smoker's respiratory syndrome        Past Surgical History:        Procedure Laterality Date    AMPUTATION      COLONOSCOPY  05/02/2016    colonic polyps    HERNIA REPAIR         Medications Prior to Admission:    Prior to Admission medications    Medication Sig Start Date End Date Taking? Authorizing Provider   doxycycline hyclate (VIBRA-TABS) 100 MG tablet Take 1 tablet by mouth 2 times daily for 10 days 6/24/19 7/4/19 Yes Feng Uribe PA-C   predniSONE (DELTASONE) 20 MG tablet 3 tabs po qam for 3 days then 2 tabs qam for 3 days the 1 tab qam for 3 days 6/24/19 7/4/19 Yes Feng Uribe PA-C   oxyCODONE (OXYCONTIN) 10 MG extended release tablet Take 10 mg by mouth every 12 hours.    Yes Historical Provider, MD   furosemide (LASIX) 40 MG tablet 1 tab daily as needed for swelling 6/10/19  Yes Joe Cobb MD   potassium chloride (KLOR-CON M) 20 MEQ extended release tablet Take 1 tablet by mouth daily 6/10/19  Yes Tashi Hogan MD   TUDORZA PRESSAIR 400 MCG/ACT AEPB inhaler INHALE 1 PUFF BY MOUTH 2 TIMES A DAY 5/31/19  Yes SAKSHI Verma CNP   DULoxetine (CYMBALTA) 60 MG extended release capsule Take 1 capsule by mouth daily 5/21/19  Yes Tashi Hogan MD   SYMBICORT 160-4.5 MCG/ACT AERO INHALE 2 PUFFS BY MOUTH 2 TIMES A DAY 4/22/19  Yes Tashi Hogan MD   fluticasone [facility]) 50 MCG/ACT nasal spray USE 2 SPRAYS IN [facility] NOSTRIL DAILY 3/19/19  Yes Tashi Hogan MD   aspirin 81 MG tablet Take 81 mg by mouth daily   Yes Historical Provider, MD   atorvastatin (LIPITOR) 40 MG tablet TAKE 1 TABLET BY MOUTH DAILY 8/21/18  Yes Tashi Hogan MD   vitamin D (CHOLECALCIFEROL) 42420 UNIT CAPS Take 1 capsule by mouth once a week 5/25/18  Yes Tashi Hogan MD   oxyCODONE-acetaminophen (PERCOCET)  MG per tablet Take 1 tablet by mouth every 6 hours as needed for Pain. 8/31/17  Yes Historical Provider, MD   ipratropium-albuterol (DUONEB) 0.5-2.5 (3) MG/3ML SOLN nebulizer solution Use qid 8/14/17  Yes Tashi Hogan MD   potassium chloride (KLOR-CON M) 20 MEQ extended release tablet Take 1 tablet by mouth daily 6/10/19   Tashi Hogan MD   diltiazem [facility]) 120 MG extended release capsule Take 120 mg by mouth daily    Historical Provider, MD   PROAIR  (51 Base) MCG/ACT inhaler INHALE 2 PUFFS BY MOUTH 4 TIMES DAILY WHEN AWAY FROM HOME 9/21/18   SAKSHI Verma CNP   Respiratory Therapy Supplies (NEBULIZER COMPRESSOR) KIT 1 kit by Does not apply route once for 1 dose 8/14/17 7/2/18  Tashi Hogan MD       Allergies:  Patient has no known allergies. Social History:  The patient currently lives home     TOBACCO:   reports that he has been smoking cigarettes. He has a 25.00 pack-year smoking history.  He has never used smokeless tobacco.  ETOH:   reports that he does not drink alcohol. Family History:  Reviewed in detail and negative for DM, Early CAD, Cancer, CVA. Positive as follows:        Problem Relation Age of Onset   Lim Cancer Mother     Diabetes Father     Heart Disease Sister        REVIEW OF SYSTEMS:   Positive for sob  and as noted in the HPI. All other systems reviewed and negative. PHYSICAL EXAM:    /83   Pulse 104   Temp 97.2 °F (36.2 °C) (Axillary)   Resp (!) 36   Ht 5' 6\" (1.676 m)   Wt 171 lb 1.6 oz (77.6 kg)   SpO2 99%   BMI 27.62 kg/m²     General appearance: No apparent distress appears stated age and cooperative. HEENT Normal cephalic, atraumatic without obvious deformity. Pupils equal, round, and reactive to light. Extra ocular muscles intact. Conjunctivae/corneas clear. Neck: Supple, No jugular venous distention/bruits. Trachea midline without thyromegaly or adenopathy with full range of motion. Lungs:  Decreased breath sounds bilaterally  Heart: Regular rate and rhythm with Normal S1/S2 without murmurs, rubs or gallops, point of maximum impulse non-displaced  Abdomen: Soft, non-tender or non-distended without rigidity or guarding and positive bowel sounds all four quadrants. Extremities: No clubbing, cyanosis, or edema bilaterally. Full range of motion without deformity and normal gait intact. Skin: Skin color, texture, turgor normal.  No rashes or lesions. Neurologic: Alert and oriented X 3, neurovascularly intact with sensory/motor intact upper extremities/lower extremities, bilaterally. Cranial nerves: II-XII intact, grossly non-focal.  Mental status: Alert, oriented, thought content appropriate.   Capillary Refill: Acceptable  < 3 seconds  Peripheral Pulses: +3 Easily felt, not easily obliterated with pressure      CXR:  I have reviewed the CXR EKG:  I have reviewed the EKG     CBC   Recent Labs     06/23/19 1941   WBC 10.8   HGB 11.7*   HCT 36.1*

## 2019-06-24 NOTE — TELEPHONE ENCOUNTER
MD Victoria Land MA             F/u for COPD in 4-5 weeks during ICU week. PFT/6MWT before visit. Pt requests this location due to distance to [place] or [facility]. Still inpt.

## 2019-06-24 NOTE — CONSULTS
6/24/19 1885 called ICU and spoke to Susie Celaya RN to inform Dr. Ayla Nelson of new consult for COPD.  Kerby Sellers

## 2019-06-24 NOTE — PROGRESS NOTES
06/24/19 1029   Treatment   Treatment Type MDI   $Treatment Type $Inhaled Therapy/Meds   Medications Albuterol   Is patient on O2? Y   Pre-Tx Pulse 110   Pre-Tx Resps 22   Breath Sounds Pre-Tx Left Diminished   Breath Sounds Pre-Tx Right Diminished   Position Sitting   Tx Tolerance Well   Cough/Sputum   Sputum How Obtained Spontaneous cough   Patient Observation   Observations 98% on 3lpm decreased to 2. 5lpm which the patient wears at home

## 2019-06-24 NOTE — CARE COORDINATION
Case Management Assessment  Initial Evaluation    Date/Time of Evaluation: 6/24/2019 4:51 PM  Assessment Completed by: Mary Leiva    Patient Name: Sana Aponte  YOB: 1945  Diagnosis: Acute on chronic respiratory failure with hypercapnia ([facility] [address] [J96.22]  Date / Time: 6/23/2019  7:23 PM  Admission status/Date: 6/24/19 03:45 inpt  Chart Reviewed: Yes      Patient Interviewed: Yes   Family Interviewed:  No      Hospitalization in the last 30 days:  Yes    Contacts  :   Manuel Mishra  Relationship to Patient: child  Phone Number:  [phone]  Alternate Contact:     Relationship to Patient:     Phone Number:    Met with: patient    Current PCP [address] required for SNF : Wil [facility]        3 night stay required - Y, N    ADLS  Support Systems: Family Members  Transportation: self    Meal Preparation: self/sistr    Housing  Home Environment:  Lives 2 story home Steps: 4  Plans to Return to Present Housing: Yes  Other Identified Issues:     Tegan Gary 77  Currently active with [facility] [address] : Yes - [facility]  Type of Home Care Services: None  Passport/Waiver : No  :                      Phone Number:    Passport/Waiver Services: N/A          Durable Medical Equipment   DME Provider:  Domonique Costa  Equipment:Walker__Cane__RTS__ BSC__Shower Chair__  02_X_ HHN_X_ CPAP__  BiPap__  Hospital Bed__ W/C___ Other__________      Has Home O2 in place on admit:  No  Informed of need to bring portable home O2 tank on day of discharge for nursing to connect prior to leaving:   Yes  Verbalized agreement/Understanding:   Yes    Community Service Affiliation  Dialysis:  No    · Name:  · Location  · Dialysis Schedule:  · Phone:   · Fax: Outpatient PT/OT: No    Cancer Center: No     CHF Clinic: No     Pulmonary Rehab: No  Pain Clinic: No  Community Mental Health: No    Wound Clinic: No     Other:     DISCHARGE PLAN:  Reviewed chart and met with pt.  Role of CM explained.  lives in own home with sister and plan return. State is safe with sister and they assist each other if needed. [facility] is active with [facility], [facility] is active with [facility] for home O2 and HHN Denies other services or needs. Will follow and pesume HHC at [place]. Explained Case Management role/services.

## 2019-06-24 NOTE — ED PROVIDER NOTES
[address]        Pt Name: Patricia Leon  MRN: [id number]  [place] [id number]  Date of evaluation: 6/23/2019  Provider: Thaddeus Berrios PA-C  PCP: Long Colby MD    This patient was seen and evaluated by the attending physician Letty Cordero COMPLAINT       Chief Complaint   Patient presents with    Shortness of Breath     patient was brought in by EMS for shortness of breath. EMS states they gave duoneb enroute. EMS states patients was wheezing. EMS states patient was recently admitted to the hospital. patietnt states he wears 2-2.5L of O2 at home PRN       HISTORY OF PRESENT ILLNESS   (Location/Symptom, Timing/Onset, Context/Setting, Quality, Duration, Modifying Factors, Severity)  Note limiting factors. Patricia Leon is a 75 y.o. male patient presented by EMS with complaint shortness of breath. Patient long history of COPD. On certain as to when diagnosed. Patient longtime smoker approximately 60-80 pack years. Began age 14 and currently 75years of age. Averages between 1 to 2 pack cigarettes daily. In the past several weeks he is decreased his tobacco use to less than 1/2 pack daily. Patient does not wear O2 on a regular basis at home. He wears it occasionally when he feels unusually short of breath. At this time is currently on O2 2 L and saturating at 98%. Patient states he woke this morning with rather moderate to severe shortness of breath. He does have slight increase in lower extremity edema a bit more than his usual baseline. [place] chest pain, gastrointestinal or urinary symptoms. Denies fevers or chills. Denies history of diabetes, CAD and CHF. At approximately 12:30 AM I spoke with daughter indicates that he smokes excessively at home. Patient last admitted this facility June 12, 2019 with COPD exacerbation and hypercapnia.     Nursing Notes were all reviewed and agreed with or any disagreements Lumbosacral radiculopathy; Degeneration of intervertebral disc of lumbar region; Chronic midline low back pain with bilateral sciatica; Primary insomnia      SYMBICORT 160-4.5 MCG/ACT AERO INHALE 2 PUFFS BY MOUTH 2 TIMES A DAY  Qty: 10.2 g, Refills: 11    Associated Diagnoses: Simple chronic bronchitis (HCC)      fluticasone (FLONASE) 50 MCG/ACT nasal spray USE 2 SPRAYS IN EACH NOSTRIL DAILY  Qty: 16 g, Refills: 11    Associated Diagnoses: Chronic pansinusitis; Chronic nasal congestion      aspirin 81 MG tablet Take 81 mg by mouth daily    Associated Diagnoses: Coronary artery disease due to lipid rich plaque      atorvastatin (LIPITOR) 40 MG tablet TAKE 1 TABLET BY MOUTH DAILY  Qty: 30 tablet, Refills: 11    Associated Diagnoses: Mixed hyperlipidemia      vitamin D (CHOLECALCIFEROL) 09062 UNIT CAPS Take 1 capsule by mouth once a week  Qty: 4 capsule, Refills: 11    Associated Diagnoses: Vitamin D deficiency      oxyCODONE-acetaminophen (PERCOCET)  MG per tablet Take 1 tablet by mouth every 6 hours as needed for Pain. Associated Diagnoses: Closed compression fracture of third lumbar vertebra, sequela; Chronic pain syndrome      ipratropium-albuterol (DUONEB) 0.5-2.5 (3) MG/3ML SOLN nebulizer solution Use qid  Qty: 120 vial, Refills: 11    Associated Diagnoses: Mucopurulent chronic bronchitis ([address]      ! ! potassium chloride (KLOR-CON M) 20 MEQ extended release tablet Take 1 tablet by mouth daily  Qty: 30 tablet, Refills: 0    Associated Diagnoses: Peripheral edema      diltiazem (TIAZAC) 120 MG extended release capsule Take 120 mg by mouth daily    Associated Diagnoses: Benign essential hypertension      PROAIR  (90 Base) MCG/ACT inhaler INHALE 2 PUFFS BY MOUTH 4 TIMES DAILY WHEN AWAY FROM HOME  Qty: 8.5 g, Refills: 11    Associated Diagnoses: Smoker's respiratory syndrome      Respiratory Therapy Supplies (NEBULIZER COMPRESSOR) KIT 1 kit by Does not apply route once for 1 dose  Qty: 1 kit, Refills: 0    Associated Diagnoses: Right-sided chest wall pain       !! - Potential duplicate medications found. Please discuss with provider. ALLERGIES     Patient has no known allergies. FAMILYHISTORY       Family History   Problem Relation Age of Onset    Cancer Mother     Diabetes Father     Heart Disease Sister           SOCIAL HISTORY       Social History     Socioeconomic History    Marital status:       Spouse name: None    Number of children: None    Years of education: None    Highest education level: None   Occupational History    None   Social Needs    Financial resource strain: None    Food insecurity:     Worry: None     Inability: None    Transportation needs:     Medical: None     Non-medical: None   Tobacco Use    Smoking status: Current Every Day Smoker     Packs/day: 1.00     Years: 25.00     Pack years: 25.00     Types: Cigarettes    Smokeless tobacco: Never Used   Substance and Sexual Activity    Alcohol use: No     Alcohol/week: 0.0 oz     Comment: occasional    Drug use: No    Sexual activity: Not Currently   Lifestyle    Physical activity:     Days per week: None     Minutes per session: None    Stress: None   Relationships    Social connections:     Talks on phone: None     Gets together: None     Attends Zoroastrianism service: None     Active member of club or organization: None     Attends meetings of clubs or organizations: None     Relationship status: None    Intimate partner violence:     Fear of current or ex partner: None     Emotionally abused: None     Physically abused: None     Forced sexual activity: None   Other Topics Concern    None   Social History Narrative    None       SCREENINGS    [place] Coma Scale  Eye Opening: Spontaneous  Best Verbal Response: Oriented  Best Motor Response: Obeys commands  [place] Coma Scale Score: 15        PHYSICAL EXAM    (up to 7 for level 4, 8 or more for level 5)     ED Triage Vitals [06/23/19 1931]   BP Temp Temp Source Pulse Resp SpO2 Height Weight   (!) 157/91 98.4 °F (36.9 °C) Oral 93 24 100 % 5' 6\" (1.676 m) 165 lb (74.8 kg)       Physical Exam   Constitutional: He is oriented to person, place, and time. He appears well-developed and well-nourished. HENT:   Head: Normocephalic and atraumatic. Right Ear: External ear normal.   Left Ear: External ear normal.   Eyes: Conjunctivae are normal. Right eye exhibits no discharge. Left eye exhibits no discharge. No scleral icterus. Neck: Normal range of motion. Cardiovascular: Normal rate, regular rhythm and normal heart sounds. Pulmonary/Chest: Effort normal. He has wheezes. Mild distress with diminished breath sounds throughout and wheezing noted. Abdominal: Soft. Bowel sounds are normal. There is no tenderness. Musculoskeletal: Normal range of motion. He exhibits edema. 3+ pretibial edema. Neurological: He is alert and oriented to person, place, and time. Skin: Skin is warm and dry. Psychiatric: He has a normal mood and affect. His behavior is normal. Judgment and thought content normal.   Nursing note and vitals reviewed.       DIAGNOSTIC RESULTS   LABS:    Labs Reviewed   CBC WITH AUTO DIFFERENTIAL - Abnormal; Notable for the following components:       Result Value    RBC 3.47 (*)     Hemoglobin 11.7 (*)     Hematocrit 36.1 (*)     .1 (*)     Neutrophils # 9.8 (*)     Lymphocytes # 0.5 (*)     All other components within normal limits    Narrative:     Performed at:  [address] [facility] [address],  [place], [facility]   Phone [phone]   APTT - Abnormal; Notable for the following components:    aPTT 24.0 (*)     All other components within normal limits    Narrative:     Performed at:  [facility] [address],  [place] [facility]   Phone [phone]   COMPREHENSIVE METABOLIC PANEL - Abnormal; Notable for the following components:    Potassium 6.1 (*)     Chloride 95 (*)     CO2 36 (*)     Glucose 107 (*)     BUN 22 (*)     CREATININE 0.7 (*)     Alkaline Phosphatase 136 (*)     All other components within normal limits    Narrative:     Vidal SUTTON tel. [phone],  Chemistry results called to and read back by Gucci Hadley, 06/23/2019  21:27, by Lola Lutz  Performed at:  [facility] [address],  [place], [place]   Phone [phone]   MAGNESIUM - Abnormal; Notable for the following components:    Magnesium 2.50 (*)     All other components within normal limits    Narrative:     Erick Collins tel. [phone],  Chemistry results called to and read back by Gucci Hadley, 06/23/2019  21:27, by Lola Lutz  Performed at:  [facility] [address],  [place], [place]   Phone [phone]   POTASSIUM - Abnormal; Notable for the following components:    Potassium 5.4 (*)     All other components within normal limits    Narrative:     Performed at:  [facility] [address],  [place], [place]   Phone [phone]   BLOOD GAS, ARTERIAL - Abnormal; Notable for the following components:    pH, Arterial 7.325 (*)     pCO2, Arterial 75.8 (*)     HCO3, Arterial 38.6 (*)     Base Excess, Arterial 9.9 (*)     Hemoglobin, Art, Extended 12.0 (*)     Carboxyhgb, Arterial 2.5 (*)     All other components within normal limits    Narrative:     [address] [phone],  Chemistry results called to and read back by Ivana Walker RN, 06/24/2019  01:16, by Lola Lutz  Performed at:  [facility] [address],  [place], [place]   Phone [phone]   BASIC METABOLIC PANEL W/ REFLEX TO MG FOR LOW K - Abnormal; Notable for the following components:    Chloride 92 (*)     CO2 38 (*)     BUN 25 (*)     CREATININE 0.7 (*)     All other components within normal limits    Narrative:     Performed at:  [facility]  [facility] [address],  [place], [facility]   Phone [phone]   CBC WITH AUTO DIFFERENTIAL - Abnormal; Notable for the following components:    WBC 16.0 (*)     RBC 3.12 (*)     Hemoglobin 10.5 (*)     Hematocrit 32.3 (*)     .5 (*)     Neutrophils # 12.7 (*)     All other components within normal limits    Narrative:     Performed at:  [facility] [address],  [place], [facility]   Phone [phone]   URINE RT REFLEX TO CULTURE    Narrative:     Performed at:  [place] [address],  [place], [facility]   Phone [phone]   PROTIME-INR    Narrative:     Performed at:  [place] [address],  [place], [facility]   Phone [phone]   TROPONIN    Narrative:     India SUTTON tel. [phone],  Chemistry results called to and read back by Raymundo Rangel, 06/23/2019  21:27, by Lynette Grier  Performed at:  [facility] [address],  [place], [facility]   Phone [phone]   [address]    Narrative:     Skip Godfrey tel. [phone],  Chemistry results called to and read back by Raymundo Rangel, 06/23/2019  21:27, by Lynette Grier  Performed at:  [place] [address],  [place], [facility]   Phone [phone]       All other labs were within normal range or not returned as of this dictation. EKG: All EKG's are interpreted by the Emergency Department Physician who either signs orCo-signs this chart in the absence of a cardiologist.  Please see their note for interpretation of EKG. RADIOLOGY:   Non-plain film images such as CT, Ultrasound and MRI are read by the radiologist. Nargis Mitchell radiographic images are visualized andpreliminarily interpreted by the  ED Provider with the below findings:    Chest x-ray shows no acute process.     Interpretation perthe Radiologist below, if available at the time of this note:    XR CHEST STANDARD (2 VW)   Final Result   Stable chest x-ray. No acute disease. No results found.        PROCEDURES   Unless otherwise noted below, none     Procedures    CRITICAL CARE TIME   N/A    CONSULTS:  IP CONSULT TO HOSPITALIST  IP CONSULT TO PULMONOLOGY      EMERGENCY DEPARTMENT COURSE and DIFFERENTIALDIAGNOSIS/MDM:   Vitals:    Vitals:    06/25/19 0245 06/25/19 0308 06/25/19 0313 06/25/19 0934   BP: 136/83   138/73   Pulse: 91   100   Resp: 16 22  18   Temp: 98.3 °F (36.8 °C)   97.1 °F (36.2 °C)   TempSrc: Oral   Oral   SpO2: 94% 92%  95%   Weight:   170 lb 1.6 oz (77.2 kg)    Height:           Patient was given thefollowing medications:  Medications   aspirin chewable tablet 81 mg (81 mg Oral Given 6/25/19 0936)   atorvastatin (LIPITOR) tablet 40 mg (40 mg Oral Given 6/25/19 0935)   diltiazem (CARDIZEM CD) extended release capsule 120 mg (120 mg Oral Given 6/25/19 0935)   DULoxetine (CYMBALTA) extended release capsule 60 mg (60 mg Oral Given 6/25/19 0935)   furosemide (LASIX) tablet 40 mg (40 mg Oral Given 6/25/19 0936)   sodium chloride flush 0.9 % injection 10 mL (10 mLs Intravenous Given 6/25/19 0936)   sodium chloride flush 0.9 % injection 10 mL (has no administration in time range)   magnesium hydroxide (MILK OF MAGNESIA) 400 MG/5ML suspension 30 mL (has no administration in time range)   ondansetron (ZOFRAN) injection 4 mg (has no administration in time range)   enoxaparin (LOVENOX) injection 40 mg (40 mg Subcutaneous Given 6/25/19 0935)   predniSONE (DELTASONE) tablet 40 mg (40 mg Oral Given 6/25/19 0935)   oxyCODONE (OXYCONTIN) extended release tablet 10 mg (10 mg Oral Given 6/25/19 0936)   oxyCODONE-acetaminophen (PERCOCET)  MG per tablet 1 tablet (has no administration in time range)   ipratropium-albuterol (DUONEB) nebulizer solution 1 ampule (has no administration in time range)   albuterol sulfate  (90 Base) MCG/ACT inhaler 2 puff (2 puffs Inhalation Given 6/25/19 0648)   sodium chloride (OCEAN, BABY AYR) 0.65 % nasal spray 1 spray (has no administration in time range)   0.9 % sodium chloride bolus (0 mLs Intravenous Stopped 6/23/19 2227)   albuterol (PROVENTIL) nebulizer solution 5 mg (5 mg Nebulization Given 6/23/19 2050)   ipratropium-albuterol (DUONEB) nebulizer solution 2 ampule (2 ampules Inhalation Given 6/23/19 2034)   methylPREDNISolone sodium (SOLU-MEDROL) injection 125 mg (125 mg Intravenous Given 6/23/19 2100)   magnesium sulfate 2 g in 50 mL IVPB premix (0 g Intravenous Stopped 6/23/19 2130)   ipratropium-albuterol (DUONEB) nebulizer solution 1 ampule (1 ampule Inhalation Given 6/24/19 0314)   azithromycin (ZITHROMAX) 500 mg in D5W 250ml addavial (0 mg Intravenous Stopped 6/24/19 0452)       Patient will require admission for further management of acute COPD exacerbation with hypercapnia with arterial PCO2 at 75. 8.  pH 7.35.  X-ray shows no acute process. The patient's initial potassium 6.1. Repeated at 5.4. Patient has normal renal function. Troponin <0.01, proBNP 153. CBC 10.88 hemoglobin 11.7. EKG shows mild tachycardia with ventricular rate of 108 otherwise normal sinus rhythm. Patient will need admission for further management of acute COPD exacerbation with CO2 retention and PCO2 of 75.8 and pH 7.35. Emergency department treatment does consist of magnesium sulfate 2 g IV, Solu-Medrol 125 mg IV, DuoNeb x2, albuterol x2 and 500 mL saline. Patient maintained on O2 2 L. Saturating 100%. Reviewed with attending physician who recommends admission. Hospitalist be contacted. I did speak with the patient's daughter who works critical care at [facility] at approximately 12:30 AM.  At approximately 1:45 AM I did speak with the patient's sister who is currently in the emergency department. Patient does indeed wear oxygen 2 L up to 5 L daily and does smoke up to 3 packs cigarettes daily.   It is recommended that  be involved with potential placement consideration. FINAL IMPRESSION      1. COPD with acute exacerbation ([address]    2. Hypercapnia    3. Hyperkalemia    4. Smoker    5. Supplemental oxygen dependent    6. Acute hypercapnic respiratory failure [facility])          DISPOSITION/PLAN   DISPOSITION Admitted 06/24/2019 03:37:54 AM      PATIENT REFERREDTO:  Holland Johnson MD  [address] [facility] [address]  [phone]            DISCHARGE MEDICATIONS:  Current Discharge Medication List      START taking these medications    Details   doxycycline hyclate (VIBRA-TABS) 100 MG tablet Take 1 tablet by mouth 2 times daily for 10 days  Qty: 20 tablet, Refills: 0      predniSONE (DELTASONE) 20 MG tablet 3 tabs po qam for 3 days then 2 tabs qam for 3 days the 1 tab qam for 3 days  Qty: 18 tablet, Refills: 0             DISCONTINUED MEDICATIONS:  Current Discharge Medication List                 (Please note that portions ofthis note were completed with a voice recognition program.  Efforts were made to edit the dictations but occasionally words are mis-transcribed. )    Antoni Garcia PA-C (electronically signed)           Antoni Garcia PA-C  06/25/19 1002
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
## 2019-06-24 NOTE — ED NOTES
Report given to JACE [facility] [place].       Nitin Espinoza RN  06/24/19 2366

## 2019-06-24 NOTE — ED NOTES
Writer spoke with patient's sister, Mirella, who states that she will come and get patient with oxygen.       Jose Reyes RN  06/24/19 2609

## 2019-06-24 NOTE — CONSULTS
Reason for referral and CC: COPD, SOB    HISTORY OF PRESENT ILLNESS: 74 yo male with a h/o Chornic pain on high dose narcotics, and COPD presented with increased SOB x 2 days. Also c/o mild new LE edema. His primary concern was staying on his home narcotic regimen. He continues to be a heavy smoker. Past Medical History:   Diagnosis Date    Amputation finger     Ankle fracture, left     Chronic nasal congestion     Chronic pain syndrome     Compression fracture of lumbar vertebra (HCC)     COPD (chronic obstructive pulmonary disease) (HCC)     DDD (degenerative disc disease)     Depression     Facet joint disease of lumbosacral region     Hyperglycemia     Hyperlipidemia     Insomnia     Radiculopathy     Smoker's respiratory syndrome      Past Surgical History:   Procedure Laterality Date    AMPUTATION      COLONOSCOPY  05/02/2016    colonic polyps    HERNIA REPAIR       Family History  family history includes Cancer in his mother; Diabetes in his father; Heart Disease in his sister. Social History:  reports that he has been smoking cigarettes. He has a 25.00 pack-year smoking history. He has never used smokeless tobacco.   reports that he does not drink alcohol. ALLERGIES:  Patient has No Known Allergies.   Continuous Infusions:    Scheduled Meds:   aspirin  81 mg Oral Daily    atorvastatin  40 mg Oral Daily    diltiazem  120 mg Oral Daily    DULoxetine  60 mg Oral Daily    furosemide  40 mg Oral Daily    sodium chloride flush  10 mL Intravenous 2 times per day    enoxaparin  40 mg Subcutaneous Daily    ipratropium-albuterol  1 ampule Inhalation Q4H    predniSONE  40 mg Oral Daily    oxyCODONE  10 mg Oral Q12H     PRN Meds:  sodium chloride flush, magnesium hydroxide, ondansetron, oxyCODONE-acetaminophen, albuterol sulfate HFA    REVIEW OF SYSTEMS:  Constitutional: Negative for fever  HENT: Negative for sore throat  Eyes: Negative for redness   Respiratory: +e for dyspnea, cough  Cardiovascular: Negative for chest pain  Gastrointestinal: Negative for vomiting, diarrhea   Genitourinary: Negative for hematuria   Musculoskeletal: + for arthralgias   Skin: Negative for rash  Neurological: Negative for syncope  Hematological: Negative for adenopathy  Psychiatric/Behavorial: Negative for anxiety    PHYSICAL EXAM: /68   Pulse 99   Temp 97.8 °F (36.6 °C) (Oral)   Resp 18   Ht 5' 6\" (1.676 m)   Wt 171 lb 1.6 oz (77.6 kg)   SpO2 97%   BMI 27.62 kg/m²  on 2lpm  Constitutional:  No acute distress. Eyes: PERRL. Conjunctivae anicteric. ENT: Normal nose. Normal tongue. Neck:  Trachea is midline. No thyroid tenderness. Respiratory: No accessory muscle usage. decreased breath sounds. No wheezes. No rales. No Rhonchi. Cardiovascular: Normal S1S2. No digit clubbing. No digit cyanosis. No LE edema. Gastrointestinal: No mass palpated. No tenderness palpated. No umbilical hernia. Lymphatic: No cervical or supraclavicular adenopathy. Skin: No rash on the exposed extremities. No Nodules or induration on exposed extremities. Psychiatric: No anxiety or Agitation. Alert and Oriented to person, place and time. CBC:   Recent Labs     06/23/19 1941   WBC 10.8   HGB 11.7*   HCT 36.1*   .1*        BMP:   Recent Labs     06/23/19 2101 06/23/19  2141     --    K 6.1* 5.4*   CL 95*  --    CO2 36*  --    BUN 22*  --    CREATININE 0.7*  --         Recent Labs     06/23/19 2101   AST 25   ALT 29   BILITOT 0.3   ALKPHOS 136*     Recent Labs     06/23/19 1941   PROTIME 10.0   INR 0.88     No results for input(s): NITRITE, COLORU, PHUR, LABCAST, WBCUA, RBCUA, MUCUS, TRICHOMONAS, YEAST, BACTERIA, CLARITYU, SPECGRAV, LEUKOCYTESUR, UROBILINOGEN, BILIRUBINUR, BLOODU, GLUCOSEU, AMORPHOUS in the last 72 hours.     Invalid input(s): KETONESU  Recent Labs     06/24/19  0108   PHART 7.325*   PPC9UOC 75.8*   PO2ART 90.1       Chest imaging was reviewed by me and showed stable cXR    ASSESSMENT:  · COPD exacerbation  · Chronic narcotic use  · Tobacco abuse    PLAN:  Supplemental oxygen to maintain SaO2 >92%; wean as tolerated  Intensive inhaled bronchodilator therapy. .    Prednisone taper   Smoking cessation advised  Pt requests to follow up here due to location    Thank you Phani Duffy MD for this consult

## 2019-06-24 NOTE — PROGRESS NOTES
RESPIRATORY THERAPY ASSESSMENT    Name:  Brandon Copeland Record Number:  [id number]  Age: 75 y.o. Gender: male  : 1945  Today's Date:  2019  Room:  /0316-01    Assessment     Is the patient being admitted for a COPD or Asthma exacerbation? Yes   (If yes the patient will be seen every 4 hours for the first 24 hours and then reassessed)    Patient Admission Diagnosis      Allergies  No Known Allergies    Minimum Predicted Vital Capacity:     966          Actual Vital Capacity:      580              Pulmonary History:COPD  Home Oxygen Therapy:  2.5L  Home Respiratory Therapy:duoneb qid/symbicort bid   Current Respiratory Therapy:  duoneb q4wa          Respiratory Severity Index(RSI)   Patients with orders for inhalation medications, oxygen, or any therapeutic treatment modality will be placed on Respiratory Protocol. They will be assessed with the first treatment and at least every 72 hours thereafter. The following severity scale will be used to determine frequency of treatment intervention.     Smoking History: Mild Exacerbation = 3    Social History  Social History     Tobacco Use    Smoking status: Current Every Day Smoker     Packs/day: 1.00     Years: 25.00     Pack years: 25.00     Types: Cigarettes    Smokeless tobacco: Never Used   Substance Use Topics    Alcohol use: No     Alcohol/week: 0.0 oz     Comment: occasional    Drug use: No       Recent Surgical History: None = 0  Past Surgical History  Past Surgical History:   Procedure Laterality Date    AMPUTATION      COLONOSCOPY  2016    colonic polyps    HERNIA REPAIR         Level of Consciousness: Alert, Oriented, and Cooperative = 0    Level of Activity: Walking unassisted = 0    Respiratory Pattern: Dyspnea with exertion;Irregular pattern;or RR less than 6 = 2    Breath Sounds: Diminshed bilaterally and/or crackles = 2    Sputum   ,  ,    Cough: Strong, spontaneous, non-productive = 0    Vital Signs   /83 Pulse 104   Temp 97.2 °F (36.2 °C) (Axillary)   Resp (!) 36   Ht 5' 6\" (1.676 m)   Wt 171 lb 1.6 oz (77.6 kg)   SpO2 99%   BMI 27.62 kg/m²   SPO2 (COPD values may differ): 88-89% on room air or greater than 92% on FiO2 28- 35% = 2    Peak Flow (asthma only): not applicable = 0    RSI: 6-41 = TID (three times daily) and Q4hr PRN for dyspnea        Plan       Goals: medication delivery and improve oxygenation    Patient/caregiver was educated on the proper method of use for Respiratory Care Devices:  Yes      Level of patient/caregiver understanding able to:   ? Verbalize understanding   ? Demonstrate understanding       ? Teach back        ? Needs reinforcement       ? No available caregiver               ? Other:     Response to education:  Good     Is patient being placed on Home Treatment Regimen? No     Does the patient have everything they need prior to discharge? NA     Comments: chart reviewed and patient assessed    Plan of Care: change duoneb q4wa to duoneb q4(copd exac x 24 hours)    Electronically signed by Keyanna Carrillo RCP on 6/24/2019 at 5:40 AM    Respiratory Protocol Guidelines     1. Assessment and treatment by Respiratory Therapy will be initiated for medication and therapeutic interventions upon initiation of aerosolized medication. 2. Physician will be contacted for respiratory rate (RR) greater than 35 breaths per minute. Therapy will be held for heart rate (HR) greater than 140 beats per minute, pending direction from physician. 3. Bronchodilators will be administered via Metered Dose Inhaler (MDI) with spacer when the following criteria are met:  a. Alert and cooperative     b. HR < 140 bpm  c. RR < 30 bpm                d. Can demonstrate a 2-3 second inspiratory hold  4. Bronchodilators will be administered via Hand Held Nebulizer MAGGIE [facility] to patients when ANY of the following criteria are met  a. Incognizant or uncooperative          b.  Patients treated with HHN at Home

## 2019-06-24 NOTE — PROGRESS NOTES
ABG drawn x 1 attempt(s) from left radial artery. Patient had positive modified Salvador's Test.  Patient was on 2 (LPM/Fio2) oxygen per NC (device). Pressure held x 10 minutes. No bleeding or bruising noted at puncture site. Patient tolerated procedure WELL.

## 2019-06-24 NOTE — PROGRESS NOTES
4 Eyes Skin Assessment     The patient is being assess for   Admission    I agree that 2 RN's have performed a thorough Head to Toe Skin Assessment on the patient. ALL assessment sites listed below have been assessed. Areas assessed by both nurses:   [x]   Head, Face, and Ears   [x]   Shoulders, Back, and Chest, Abdomen  [x]   Arms, Elbows, and Hands   [x]   Coccyx, Sacrum, and Ischium  [x]   Legs, Feet, and Heels        2cm x 2cm blister area on R heel. Multiple abrasions and bruises, scattered BUE and BLE.    **SHARE this note so that the co-signing nurse is able to place an eSignature**    Co-signer eSignature: Electronically signed by Johan Granado RN on 6/24/19 at 5:53 AM    Does the Patient have Skin Breakdown?   No          [facility] Prevention initiated:  Yes   Wound Care Orders initiated:  No      WOC nurse consulted for Pressure Injury (Stage 3,4, Unstageable, DTI, NWPT, Complex wounds)and New or Established Ostomies:  No      Primary Nurse eSignature: Electronically signed by Wilmer Ambrocio RN on 6/24/19 at 5:37 AM

## 2019-06-24 NOTE — ED NOTES
Pop VALLES made aware of potassium of 6. 11. Pop VALLES wants potassium re-drawn by lab.  RN called lab     Robyn Alfredo RN  06/23/19 8373

## 2019-06-25 NOTE — PROGRESS NOTES
AM assessment completed. Scheduled medications given per MAR. VSS on 2.5lpm. A/O x4. Denies any needs, call light in reach. Will monitor.

## 2019-06-25 NOTE — PROGRESS NOTES
RESPIRATORY THERAPY ASSESSMENT    Name:  Brandon Copeland Record Number:  [id number]  Age: 75 y.o. Gender: male  : 1945  Today's Date:  2019  Room:  /0316-01    Assessment     Is the patient being admitted for a COPD or Asthma exacerbation? Yes   (If yes the patient will be seen every 4 hours for the first 24 hours and then reassessed)    Patient Admission Diagnosis      Allergies  No Known Allergies    Minimum Predicted Vital Capacity:     966          Actual Vital Capacity:      643              Pulmonary History: COPD  Home Oxygen Therapy:   2.5lpm  Home Respiratory Therapy: Albuterol 2puffs Q4WA, Duoneb, Tudorza, Symbicort    Current Respiratory Therapy:   Duoneb Q4, Albuterol prn   Treatment Type: MDI  Medications: Albuterol    Respiratory Severity Index(RSI)   Patients with orders for inhalation medications, oxygen, or any therapeutic treatment modality will be placed on Respiratory Protocol. They will be assessed with the first treatment and at least every 72 hours thereafter. The following severity scale will be used to determine frequency of treatment intervention.     Smoking History: Mild Exacerbation = 3    Social History  Social History     Tobacco Use    Smoking status: Current Every Day Smoker     Packs/day: 1.00     Years: 25.00     Pack years: 25.00     Types: Cigarettes    Smokeless tobacco: Never Used   Substance Use Topics    Alcohol use: No     Alcohol/week: 0.0 oz     Comment: occasional    Drug use: No       Recent Surgical History: None = 0  Past Surgical History  Past Surgical History:   Procedure Laterality Date    AMPUTATION      COLONOSCOPY  2016    colonic polyps    HERNIA REPAIR         Level of Consciousness: Alert, Oriented, and Cooperative = 0    Level of Activity: Mostly sedentary, minimal walking = 2    Respiratory Pattern: Dyspnea with exertion;Irregular pattern;or RR less than 6 = 2    Breath Sounds: Absent bilaterally and/or with wheezes = 3    Sputum   ,  , Sputum How Obtained: Spontaneous cough  Cough: Strong, spontaneous, non-productive = 0    Vital Signs   /88   Pulse 94   Temp 98.3 °F (36.8 °C) (Oral)   Resp 20   Ht 5' 6\" (1.676 m)   Wt 171 lb 1.6 oz (77.6 kg)   SpO2 96%   BMI 27.62 kg/m²   SPO2 (COPD values may differ): 88-89% on room air or greater than 92% on FiO2 28- 35% = 2    Peak Flow (asthma only): not applicable = 0    RSI: 34-63 = Q6H or QID and Q4HPRN for dyspnea        Plan       Goals:  Medication delivery    Patient/caregiver was educated on the proper method of use for Respiratory Care Devices:  Yes      Level of patient/caregiver understanding able to:   ? Verbalize understanding   ? Demonstrate understanding       ? Teach back        ? Needs reinforcement       ? No available caregiver               ? Other:     Response to education:  Very Good     Is patient being placed on Home Treatment Regimen? Yes     Does the patient have everything they need prior to discharge? NA     Comments:  Chart reviewed, patient assessed    Plan of Care: Albuterol Q4WA, Duoneb prn    Electronically signed by Ruth Corado RCP on 6/25/2019 at 1:46 AM    Respiratory Protocol Guidelines     1. Assessment and treatment by Respiratory Therapy will be initiated for medication and therapeutic interventions upon initiation of aerosolized medication. 2. Physician will be contacted for respiratory rate (RR) greater than 35 breaths per minute. Therapy will be held for heart rate (HR) greater than 140 beats per minute, pending direction from physician. 3. Bronchodilators will be administered via Metered Dose Inhaler (MDI) with spacer when the following criteria are met:  a. Alert and cooperative     b. HR < 140 bpm  c. RR < 30 bpm                d. Can demonstrate a 2-3 second inspiratory hold  4. Bronchodilators will be administered via Hand Held Nebulizer MAGGIE [facility]) to patients when ANY of the following criteria are met  a.  Incognizant or

## 2019-06-25 NOTE — PROGRESS NOTES
Per patient's dtr, pt has a bottle of controlled pain meds in his pockets and has been taking them throughout the day. This RN and Tc Watt spoke with patient who agreed to place his meds in pharmacy until d/c.

## 2019-06-25 NOTE — PLAN OF CARE
Problem: Falls - Risk of:  Goal: Will remain free from falls  Description  Will remain free from falls  6/25/2019 0001 by Gorden Dubin, RN  Outcome: Ongoing  6/24/2019 1422 by Francisco Pina RN  Outcome: Ongoing     Problem: Risk for Impaired Skin Integrity  Goal: Tissue integrity - skin and mucous membranes  Description  Structural intactness and normal physiological function of skin and  mucous membranes.   6/25/2019 0001 by Gorden Dubin, RN  Outcome: Ongoing  6/24/2019 1422 by Francisco Pina RN  Outcome: Ongoing

## 2019-06-25 NOTE — PROGRESS NOTES
End of shift report given to [facility]. Pt in stable condition, care transferred at this time.  Electronically signed by Dwayne Anders RN on 6/25/2019 at 7:27 AM

## 2019-06-25 NOTE — PROGRESS NOTES
Physical Therapy/Occupational Therapy  13:54 PT/OT to HOLD evaluations until results of LE dopplers are available. RN made aware. Lon López, PT, DPT #[id number]  Mary Sherman OTR/L #[id number]     16:49 (-) dopplers. Attempted evaluation this afternoon. Patient declined due to not feeling well. OT/PT will follow up tomorrow morning as appropriate.        Mary Sherman, OTR/L #[id number]  Lon López PT, DPT #[id number]

## 2019-06-25 NOTE — PLAN OF CARE
Problem: Falls - Risk of:  Goal: Will remain free from falls  Description  Will remain free from falls  6/25/2019 0001 by Sachin White RN  Outcome: Ongoing  6/24/2019 1422 by Vj Hobbs RN  Outcome: Ongoing     Problem: Risk for Impaired Skin Integrity  Goal: Tissue integrity - skin and mucous membranes  Description  Structural intactness and normal physiological function of skin and  mucous membranes. 6/25/2019 0001 by Sachin White RN  Outcome: Ongoing  6/24/2019 1422 by Vj Hobbs RN  Outcome: Ongoing     Problem:  Activity Intolerance:  Goal: Ability to tolerate increased activity will improve  Description  Ability to tolerate increased activity will improve  Outcome: Ongoing     Problem: Airway Clearance - Ineffective:  Goal: Ability to maintain a clear airway will improve  Description  Ability to maintain a clear airway will improve  Outcome: Ongoing     Problem: Breathing Pattern - Ineffective:  Goal: Ability to achieve and maintain a regular respiratory rate will improve  Description  Ability to achieve and maintain a regular respiratory rate will improve  Outcome: Ongoing     Problem: Gas Exchange - Impaired:  Goal: Levels of oxygenation will improve  Description  Levels of oxygenation will improve  Outcome: Ongoing

## 2019-06-25 NOTE — PROGRESS NOTES
Shift assessment complete. Call light and bedside table within reach.  Electronically signed by Gabriella Kussmaul, RN on 6/24/2019 at 8:11 PM

## 2019-06-26 NOTE — TELEPHONE ENCOUNTER
01/26/19 1100   [facility] Group Therapy   Group Name Medication   Participation Level None   Participation Quality Refused      Still inpt.

## 2019-06-26 NOTE — PROGRESS NOTES
Patient resting quietly in bed, VSS. Denies needs at this time. Scheduled medications given per MAR. Resps e/e, lung sounds diminished on 4L. Abdomen taut, denies tenderness. +BS. Shift assessment complete, see flowsheet. Call light in reach, will monitor.

## 2019-06-26 NOTE — PROGRESS NOTES
Inpatient Physical Therapy Evaluation and Treatment    Unit: PCU  Date:  2019  Patient Name:    Jamaica Patiño  Admitting diagnosis:  Acute on chronic respiratory failure with hypercapnia [facility]) [J96.22]  Admit Date:  2019  Precautions/Restrictions/WB Status/ Lines/ Wounds/ Oxygen: fall risk, IV, bed/chair alarm, supplemental o2 (3L), telemetry and continuous pulse ox    Treatment Time: 840  Treatment Number:  1   Timed Code Treatment Minutes: 21 minutes  Total Treatment Minutes:  31  minutes    Patient Goals for Therapy: \" to go home \"          Discharge Recommendations: Home with PRN assist  DME needs for discharge: Needs Met       Therapy recommendations for staff:   Assist of 1 with use of No AD for all transfers or ambulation to/from bathroom    Home Health S4 Level Recommendation:  NA  AM-PAC Mobility Score    AM-PAC Inpatient Mobility Raw Score : 22       Preadmission Environment    Pt. Lives With Family and  Assist Available  (sister)   Home environment:  two story home (pt's bedroom/bathroom are on main level)   Steps to enter first floor: 3 Steps to enter and One Hand rail  Steps to second floor: Full flight of 12-13 and One Hand Rail  Bathroom: Walk in [employer], [employer], Shower Chair  and Standard height toilet  Equipment owned: [facility] [facility], Shower Chair, home O2 (2.5 L) PRN and pulse ox, inhaler, nebulizer     Preadmission Status:  Pt. Able to drive: Yes  Pt Fully independent with ADLs: Yes  Pt. Required assistance from family for: Independent PTA  Pt. Fully independent for transfers and gait and walked with No Device  History of falls Yes, reports falling during admission to [facility] approx.  2 months ago     Pain   Yes  Location: lower back   Ratin /10  Pain Medicine Status: Received pain med prior to tx    Cognition    A&O x4 (oriented to year only for current date)   Able to follow 1 step commands    Subjective  Patient lying supine in bed with no family present  Pt agreeable to this PT eval & tx. Pt drowsy throughout session. Upper Extremity ROM/Strength  Please see OT evaluation. Lower Extremity ROM / Strength    AROM WFL: Yes  ROM limitations:     Strength Assessment (measured on a 0-5 scale):  R LE   Quad   4   Ant Tib  4   Hamstring 5/5   Iliopsoas 4  L LE  Quad   4   Ant Tib  4   Hamstring 5/5   Iliopsoas 4    Lower Extremity Sensation    WFL; significant swelling to BLEs below the knees     Lower Extremity Proprioception:   WNL    Coordination and Tone  WNL    Balance  Static Sitting:  Good    Tolerance: WNL  Dynamic Sitting:  Good   Static Standing: Good    Tolerance: WFL  Dynamic Standing: Good -     Bed Mobility   Supine to Sit:   Independent  Sit to Supine:  Not Tested  Rolling:   Independent  Scooting at EOB: Independent  Scooting to [facility]:  Not Tested    Transfer Training     Sit to stand:   Independent  Stand to sit: Independent  Bed to Chair:  Independent with use of No AD    Gait gait completed as indicated below  Distance:  30 ft  Deviations (firm surface/linoleum): decreased cheryl   Assistive Device Used:  No AD  Level of Assist: SBA  Comment:     Stair Training deferred, pt unsafe/not appropriate to complete stairs at this time    Activity Tolerance   Pt completed therapy session with SOB noted w/activity  SpO2: >/=95%  HR: 80s  BP:     Positioning Needs   Pt reclined in chair, call light and needs in reach and alarm set    Exercises Initiated    N/A    Other  None. Patient/Family Education   Pt educated on role of inpatient PT, POC, importance of continued activity, DC recommendations, safety awareness and calling for assist with mobility. Assessment  Pt seen for Physical Therapy evaluation in acute care setting. Pt demonstrated decreased Activity tolerance, Safety and Strength and decreased independence with Ambulation. Goals : To be met in 3 visits:  1). Independent with LE Ex x 10 reps    To be met in 6 visits:  1).   Gait: Ambulate 150 ft with  Supervision  and use of No AD  2). Tolerate B LE exercises 3 sets of 10-15 reps  3). Ascend/descend 3 steps with SBA with use of One Hand rail and No AD. Rehabilitation Potential: Good  Strengths for achieving goals include:   PLOF, Family Support and Pt cooperative  Barriers to achieving goals include:    Pain     Plan    To be seen 2-3 x / week  while in acute care setting for therapeutic exercises, bed mobility, transfers, progressive gait training, balance training, and family/patient education. Harry Lozano, PT, DPT #[id number]     If patient discharges from this facility prior to next visit, this note will serve as the Discharge Summary.

## 2019-06-26 NOTE — PROGRESS NOTES
inhaled bronchodilator therapy.   · Prednisone taper   · Smoking cessation advised  · maybe dc tomorrow

## 2019-06-26 NOTE — CARE COORDINATION
INTERDISCIPLINARY PLAN OF CARE CONFERENCE    Date/Time: 6/26/2019 4:36 PM  Completed by: Patricia Wisdom Case Management      Patient Name:  Nghia Senior  YOB: 1945  Admitting Diagnosis: Acute on chronic respiratory failure with hypercapnia [facility]) [J96.22]     Admit Date/Time:  6/23/2019  7:23 PM    Chart reviewed. Interdisciplinary team met to discuss patient progress and discharge plans. Disciplines included Case Management, Nursing, and Dietitian. Current Status Stable stable    PT/OT recommendation:Home with prn assist    Anticipated Discharge Date: TBD  Expected D/C Disposition:  Home  Confirmed plan with patient and/or family Yes-emelyn  Discharge Plan Comments: Reviewed chart and met with pt who cont plan for home with resumption of [facility] for SN, PT, SW and HHA. Spoke with pt and emelyn at bedside. Per emelyn pt sister who lives with him is moving out and pt will be alone. Discussed HHC and SW to assist with community resources. Will cont to follow.     Home O2 in place on admit: No  Pt informed of need to bring portable home O2 tank on day of discharge for nursing to connect prior to leaving:  Not Indicated  Verbalized agreement/Understanding:  Not Indicated

## 2019-06-26 NOTE — PROGRESS NOTES
Progress Note    Admit Date:  6/23/2019    Subjective:  Mr. Teddy Muniz feels the same    Objective:   Patient Vitals for the past 4 hrs:   SpO2 Weight   06/26/19 0734 96 % --   06/26/19 0445 -- 169 lb 12.8 oz (77 kg)            Intake/Output Summary (Last 24 hours) at 6/26/2019 0810  Last data filed at 6/26/2019 0351  Gross per 24 hour   Intake 1140 ml   Output 3245 ml   Net -2105 ml       Physical Exam:    Gen: No distress. Alert. Eyes: PERRL. No sclera icterus. No conjunctival injection. ENT: No discharge. Pharynx clear. Neck: No JVD. Trachea midline. Resp: Diminished air entry throughout. No accessory muscle use. No crackles. + scattered wheezes. No rhonchi. CV: Regular rate. Regular rhythm. No murmur. No rub. ++ edema. Capillary Refill: Brisk,< 3 seconds   Peripheral Pulses: +2 palpable, equal bilaterally   GI: Non-tender. Non-distended. Normal bowel sounds. Skin: Warm and dry. No nodule on exposed extremities. No rash on exposed extremities. M/S: No cyanosis. No joint deformity. No clubbing. Neuro: Awake. Grossly nonfocal    Psych: Oriented x 3. No anxiety or agitation.        Scheduled Meds:   albuterol sulfate HFA  2 puff Inhalation Q4H WA    furosemide  40 mg Intravenous BID    aspirin  81 mg Oral Daily    atorvastatin  40 mg Oral Daily    diltiazem  120 mg Oral Daily    DULoxetine  60 mg Oral Daily    sodium chloride flush  10 mL Intravenous 2 times per day    enoxaparin  40 mg Subcutaneous Daily    predniSONE  40 mg Oral Daily    oxyCODONE  10 mg Oral Q12H       Continuous Infusions:      PRN Meds:  ipratropium-albuterol, sodium chloride, albuterol sulfate HFA, sodium chloride flush, magnesium hydroxide, ondansetron, oxyCODONE-acetaminophen      Data:  CBC:   Recent Labs     06/23/19  1941 06/25/19  0443 06/26/19  0534   WBC 10.8 16.0* 12.7*   HGB 11.7* 10.5* 10.3*   HCT 36.1* 32.3* 31.0*   .1* 103.5* 103.3*    227 232     BMP:   Recent Labs     06/23/19 2101 06/23/19  2141 06/25/19  0443 06/26/19  0534     --  137 138   K 6.1* 5.4* 4.7 4.4   CL 95*  --  92* 90*   CO2 36*  --  38* 39*   BUN 22*  --  25* 23*   CREATININE 0.7*  --  0.7* 0.6*     LIVER PROFILE:   Recent Labs     06/23/19  2101   AST 25   ALT 29   BILITOT 0.3   ALKPHOS 136*     PT/INR:   Recent Labs     06/23/19  1941   PROTIME 10.0   INR 0.88        RADIOLOGY  VL Extremity Venous Bilateral         XR CHEST STANDARD (2 VW)   Final Result   Stable chest x-ray. No acute disease. Echo 2/2019    - Left ventricle: The cavity size was normal. Wall thickness was    normal. Systolic function was normal. The calculated ejection    fraction was in the range of 64% to 68%. Normal diastolic    function.  - Aortic valve: Mean gradient (S): 8mm Hg. Peak gradient (S): 14mm    Hg. - Inferior vena cava: The vessel was normal in size; the    respirophasic diameter changes were in the normal range (>= 50%);    findings are consistent with normal central venous pressure. [facility] 2/2019  Procedures performed:  1. Left Cardiac Catheterization  2. Selective Coronary Angiography  Impression[de-identified]     1. Non-obstructive coronary artery disease in a right dominant   system. 2. LVeDP: 10       Assessment/Plan:    #COPD  AE  - discharged 6/15/19 from [facility] after admission for same, treated with zithromax at that time   - prednisone taper   - inhaled bronchodilators   - no infiltrate on CXR  - smoking cessation  - PT OT     #Chronic hypoxic and hypercapnic resp failure   - uses 2.5L NC o2 at home, stable on this now     #Tobacco Dependence  -Recommended cessation  - nicotine patch ordered     #Bilateral lower extremity edema  - no history CHF, echo 2/2019 reviewed above  - rule out DVT with BLE dopplers  - suspect likely from immobility and not elevating legs at home. Has been taking lasix 40 mg daily without improvement.   Would benefit from compression hose on discharge     #Non obstructive CAD  #HTN  - cont cardizem,

## 2019-06-26 NOTE — PROGRESS NOTES
SBA  6). Pt to monika UE exs x 15 reps    Rehabilitation Potential:  Good for goals listed above. Strengths for achieving goals include: PLOF and Pt cooperative  Barriers to achieving goals include:  Pain      Plan: To be seen 2-3 x/wk  while in acute care setting for therapeutic exercises, bed mobility, transfers, dressing, bathing, family/patient education with adaptive equipment, breathing technique instruction.      Fanny Gray, OTR/L [id number]            If patient discharges from this facility prior to next visit, this note will serve as the Discharge Summary

## 2019-06-26 NOTE — PROGRESS NOTES
Asked to see pt by staff. Staff reports that the pt had pain pills yesterday in his pocket per his Fouzia Yanes. I spoke with pt about staff concern regarding pills and drowsiness. Pt states we can search his things and that he does not have any meds with him. Pt A and O at this time and easy to awaken with verbal stimuli.   Bryanna Bacon RN

## 2019-06-27 NOTE — DISCHARGE INSTR - COC
Continuity of Care Form    Patient Name: Yeyo Salguero   :    MRN:  [id number]    Admit date:  2019  Discharge date:  19    Code Status Order: Full Code   Advance Directives:   Advance Care Flowsheet Documentation     Date/Time Healthcare Directive Type of Healthcare Directive Copy in [address] Agent's Name Healthcare Agent's Phone Number    [phone]  No, patient does not have an advance directive for healthcare treatment -- -- -- -- --          Admitting Physician:  Sara Patrick MD  PCP: Allison Burns MD    Discharging Nurse: Rosibel Hernández [address] Unit/Room#: /1532-71  Discharging Unit Phone Number: [phone]      Emergency Contact:   Extended Emergency Contact Information  Primary Emergency Contact: Raj Casijasonzaheer Marinarhiannon Taveras Phone: [phone]  Relation: Child    Past Surgical History:  Past Surgical History:   Procedure Laterality Date    AMPUTATION      COLONOSCOPY  2016    colonic polyps    HERNIA REPAIR         Immunization History:   Immunization History   Administered Date(s) Administered    Influenza 2013    Influenza Virus Vaccine 10/15/2012, 2014, 2015    Influenza, [place], 3 Years and older, IM (Fluzone 3 yrs and older or Afluria 5 yrs and older) 2017, 10/13/2017, 2018    Pneumococcal Conjugate 13-valent ([place]) 2014, 2015, 2017    Pneumococcal Polysaccharide ([id number]) 10/11/2016    Tdap (Boostrix, Adacel) 2007       Active Problems:  Patient Active Problem List   Diagnosis Code    Coronary artery disease involving native coronary artery of native heart without angina pectoris I25.10    OA (osteoarthritis) M19.90    Hyperglycemia R73.9    Chronic pain syndrome G89.4    Chronic nasal congestion R09.81    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    Smoker's respiratory syndrome F17.200    DDD (degenerative disc disease) [id number]    Ankle fracture, left S82.892A    Compression fracture of lumbar vertebra S32.000A    Facet joint disease of lumbosacral region M47.817    Radiculopathy M54.10    Seborrheic keratosis L82.1    Chronic rhinosinusitis J32.9    Chronic ankle pain M25.579, G89.29    Back pain M54.9    Opiate analgesic contract exists Z02.89    Primary insomnia F51.01    Degeneration of intervertebral disc of lumbar region M51.36    Chronic low back pain M54.5, G89.29    Coronary artery disease due to lipid rich plaque I25.10, I25.83    Reactive depression F32.9    Mixed hyperlipidemia E78.2    Right-sided chest wall pain R07.89    Mucopurulent chronic bronchitis (HCC) J41.1    Olecranon bursitis of right elbow M70.21    Anxiety F41.9    Smoker F17.200    Benign essential hypertension I10    COPD with acute exacerbation (HCC) J44.1    Acute on chronic respiratory failure with hypercapnia (HCC) J96.22    Macrocytic anemia D53.9    Acute and chronic respiratory failure with hypercapnia (HCC) J96.22    Centrilobular emphysema (HCC) J43.2    Chronic respiratory failure with hypoxia (HCC) J96.11    Acute on chronic respiratory failure with hypoxia (HCC) J96.21    Pedal edema R60.0       Isolation/Infection:   Isolation          No Isolation            Nurse Assessment:  Last Vital Signs: BP (!) 175/83   Pulse 87   Temp 98 °F (36.7 °C) (Oral)   Resp 17   Ht 5' 6\" (1.676 m)   Wt 162 lb 1.6 oz (73.5 kg)   SpO2 97%   BMI 26.16 kg/m²     Last documented pain score (0-10 scale): Pain Level: 7  Last Weight:   Wt Readings from Last 1 Encounters:   06/27/19 162 lb 1.6 oz (73.5 kg)     Mental Status:  oriented, alert, coherent, logical and thought processes intact    IV Access:  - None    Nursing Mobility/ADLs:  Walking   Independent  Transfer  Independent  Bathing  Independent  Dressing  Independent  Toileting  Independent  Feeding  Independent  Med Admin  Independent  Med Delivery   whole    Wound Care Documentation and Therapy:  Wound 06/14/19 Heel Right 2cm x 2 cm , blood blister looking, purple in color is intact (Active)   Wound Traumatic 6/14/2019  8:35 PM   Dressing Status Clean;Dry; Intact 6/14/2019  8:35 PM   Dressing Changed Changed/New 6/14/2019 12:40 PM   Dressing/Treatment Open to air 6/15/2019  8:54 AM   Wound Cleansed Rinsed/Irrigated with saline 6/14/2019 12:40 PM   Wound Length (cm) 2 cm 6/24/2019  7:50 AM   Wound Width (cm) 2 cm 6/24/2019  7:50 AM   Wound Surface Area (cm^2) 4 cm^2 6/24/2019  7:50 AM   Change in Wound Size % (l*w) 0 6/24/2019  7:50 AM   Purple%Wound Bed 100 6/25/2019  9:36 AM   Number of days: 13        Elimination:  Continence:   · Bowel: Yes  · Bladder: Yes  Urinary Catheter: None   Colostomy/Ileostomy/Ileal Conduit: No       Date of Last BM:     Intake/Output Summary (Last 24 hours) at 6/27/2019 0942  Last data filed at 6/27/2019 0842  Gross per 24 hour   Intake 1200 ml   Output 1275 ml   Net -75 ml     I/O last 3 completed shifts: In: 12 [P.O.:960]  Out: 1275 [Urine:1275]    Safety Concerns:     None    Impairments/Disabilities:      None    Nutrition Therapy:  Current Nutrition Therapy:   - Oral Diet:  Cardiac    Routes of Feeding: Oral  Liquids: Thin Liquids  Daily Fluid Restriction: no  Last Modified Barium Swallow with Video (Video Swallowing Test): not done    Treatments at the Time of Hospital Discharge:   Respiratory Treatments:   Oxygen Therapy:  is on oxygen at 2 L/min per nasal cannula. Ventilator:    - No ventilator support    Rehab Therapies:   Weight Bearing Status/Restrictions: No weight bearing restirctions  Other Medical Equipment (for information only, NOT a DME order):     Other Treatments:     Patient's personal belongings (please select all that are sent with patient):  None    RN SIGNATURE:  Electronically signed by Ramon Arias RN on 6/27/19 at 12:27 PM    CASE MANAGEMENT/SOCIAL WORK SECTION    Inpatient Status Date: 6/24/19    Readmission Risk Assessment Score:  Readmission Risk              Risk of Unplanned Readmission:        26           Discharging to Facility/ Agency   · Discharging to Facility/ Agency   · Name:  [facility]    · Address: [address] [place] [address], [facility] [address]  · Phone: [phone]  · Fax: [phone]    HOME HEALTH CARE: L[address] [place] [address],[facility], [address] agency to establish plan of care for patient over 60 day period   Nursing  Initial home SN evaluation visit to occur within 24-48 hours for:  medication management  VS and clinical assessment  S&S chronic disease exacerbation education + when to contact MD / NP  care coordination  Medication Reconciliation during 1st SN visit    PT/OT/Speech   Evaluations in home within 24-48 hours of discharge to include DME and home safety   Frontload therapy 5 days, then 3x a week   OT to evaluate if patient has [address] needs for personal care      evaluation within 24-48 hours to evaluate resources & insurance for potential AL, [place], LTC, and Medicaid options     PCP Visit scheduled within 3 - 7 days of hospital discharge          / signature: Electronically signed by Yumiko Jaime RN on 6/27/19 at 9:42 AM    PHYSICIAN SECTION    Prognosis: Good    Condition at Discharge: Stable    Rehab Potential (if transferring to Rehab): {Prognosis:[id number]}    Recommended Labs or Other Treatments After Discharge: ***    Physician Certification: I certify the above information and transfer of Xavier Lucas  is necessary for the continuing treatment of the diagnosis listed and that he requires [address] for less 30 days.      Update Admission H&P: No change in H&P    PHYSICIAN SIGNATURE:  Electronically signed by LUIS Cobos RN on 6/27/19 at 9:43 AM

## 2019-06-27 NOTE — CARE COORDINATION
Care Transition Update:     Received nH predict outcome. Greater gains expected with HH. Patient to DC to home today with [facility]. Confirmed with [facility] liaison Fidel Becker who is aware patient is PHP. Patient states he is trying to quit smoking. He declines RT referral with [facility] services at this time. Sharath Tomas will ask for  to discuss and evaluate for additional services. Patient included in care transition.      Manual Sender, RN  Care Transitions Nurse  [phone] mobile  [phone] office    Follow up appointments:    Future Appointments   Date Time Provider Joanne Kessler   7/2/2019  1:20 PM [facility]azmin Laws [facility]   7/11/2019 11:20 AM SAKSHI Cuevas - CNP [facility]   7/30/2019  1:00 PM Wilfrid Zabala MD [facility]

## 2019-06-27 NOTE — PROGRESS NOTES
Discharge paperwork reviewed with pt. Pt. Denies any questions at this time. Pt. Domo albright. Waiting for outpatient pharmacy to bring up new prescriptions.  Ethan Mckeon RN

## 2019-06-27 NOTE — TELEPHONE ENCOUNTER
Muriel Harada called pt being discharged today  With copd dr. Cortes Oliver wants patient to have  bloodwork done within a week. Please put orders  In  Patient has a f/u appointment on 7/11 with  Helen Reagan.  Please call the patient when this is  done

## 2019-06-27 NOTE — PROGRESS NOTES
Pts. brother here to get him. Home oxygen brought up with brother. Home medications that were in inpatient pharmacy have been given in sealed bag by this RN.  Ashwini Guajardo RN

## 2019-06-27 NOTE — DISCHARGE SUMMARY
Name:  Brijesh Buenrostro  Room:  /3454-78  MRN:    [id number]    Discharge Summary      This discharge summary is in conjunction with a complete physical exam done on the day of discharge. Discharging Physician: Dr. Kandis Hobson: 6/23/2019  Discharge:   6/27/2019     HPI taken from admission H&P:    The patient is a 75 y.o. male who presents to [facility] with acute onset and progressive course of sob. Sob started yesterday sob worse with exertion no relieving factors sob associated with cough no fever chills cp leg swelling hx of similar episode in past       Diagnoses this Admission and Hospital Course     #COPD  AE  - discharged 6/15/19 from [facility] after admission for same, treated with zithromax at that time   - prednisone taper   - inhaled bronchodilators   - doxy to complete course   - no infiltrate on CXR  - smoking cessation  - PT OT      #Chronic hypoxic and hypercapnic resp failure   - uses 2.5L NC o2 at home, stable on this now      #Tobacco Dependence  -Recommended cessation  - nicotine patch ordered      #Bilateral lower extremity edema  - no history CHF, echo 2/2019 reviewed above  - ruled out DVT with BLE dopplers  - suspect likely from immobility and not elevating legs at home. Has been taking lasix 40 mg daily without improvement. Placed on iv lasix with excellent response - 10 lb weight loss. Increase lasix to 40 mg BID and  and use compression hose      #Non obstructive CAD  #HTN  - cont cardizem, lipitor, asa      #Chronic pain with opiate dependence  - home meds continued       Procedures (Please Review Full Report for Details)  None     Consults    Pulm     Physical Exam at Discharge:    BP (!) 175/83   Pulse 87   Temp 98 °F (36.7 °C) (Oral)   Resp 17   Ht 5' 6\" (1.676 m)   Wt 162 lb 1.6 oz (73.5 kg)   SpO2 96%   BMI 26.16 kg/m²     Gen: No distress. Alert. Eyes: PERRL. No sclera icterus. No conjunctival injection. ENT: No discharge. Pharynx clear. Neck: No JVD. Trachea midline. Resp: Diminished air entry throughout. No accessory muscle use. No crackles. + scattered wheezes. No rhonchi. CV: Regular rate. Regular rhythm. No murmur. No rub. + edema. GI: Non-tender. Non-distended. Normal bowel sounds. Skin: Warm and dry. No nodule on exposed extremities. No rash on exposed extremities. M/S: No cyanosis. No joint deformity. No clubbing. Neuro: Awake. Grossly nonfocal    Psych: Oriented x 3. No anxiety or agitation. CBC:   Recent Labs     06/25/19  0443 06/26/19  0534   WBC 16.0* 12.7*   HGB 10.5* 10.3*   HCT 32.3* 31.0*   .5* 103.3*    232     BMP:   Recent Labs     06/25/19 0443 06/26/19  0534 06/27/19  0515    138 141   K 4.7 4.4 3.9   CL 92* 90* 91*   CO2 38* 39* 44*   BUN 25* 23* 28*   CREATININE 0.7* 0.6* 0.7*       RADIOLOGY  VL Extremity Venous Bilateral   Final Result      XR CHEST STANDARD (2 VW)   Final Result   Stable chest x-ray. No acute disease. Discharge Medications     Medication List      START taking these medications    doxycycline hyclate 100 MG tablet  Commonly known as:  VIBRA-TABS  Take 1 tablet by mouth 2 times daily for 4 days        CHANGE how you take these medications    furosemide 40 MG tablet  Commonly known as:  LASIX  1 bid  What changed:  additional instructions  Notes to patient:  40 mg twice a day     potassium chloride 20 MEQ extended release tablet  Commonly known as:  KLOR-CON M  Take 1 tablet by mouth daily  What changed:  Another medication with the same name was removed. Continue taking this medication, and follow the directions you see here. predniSONE 20 MG tablet  Commonly known as:  DELTASONE  3 tabs po qam for 3 days then 2 tabs qam for 3 days the 1 tab qam for 3 days  What changed:    · medication strength  · See the new instructions.         CONTINUE taking these medications    aspirin 81 MG tablet     atorvastatin 40 MG tablet  Commonly known as:  LIPITOR  TAKE 1 TABLET BY MOUTH DAILY     DULoxetine 60 MG extended release capsule  Commonly known as:  CYMBALTA  Take 1 capsule by mouth daily     fluticasone 50 MCG/ACT nasal spray  Commonly known as:  FLONASE  USE 2 SPRAYS IN EACH NOSTRIL DAILY     ipratropium-albuterol 0.5-2.5 (3) MG/3ML Soln nebulizer solution  Commonly known as:  DUONEB  Use qid     Nebulizer Compressor Kit  1 kit by Does not apply route once for 1 dose     oxyCODONE 10 MG extended release tablet  Commonly known as:  OXYCONTIN     oxyCODONE-acetaminophen  MG per tablet  Commonly known as:  PERCOCET     PROAIR  (90 Base) MCG/ACT inhaler  Generic drug:  albuterol sulfate HFA  INHALE 2 PUFFS BY MOUTH 4 TIMES DAILY WHEN AWAY FROM HOME     SYMBICORT 160-4.5 MCG/ACT Aero  Generic drug:  budesonide-formoterol  INHALE 2 PUFFS BY MOUTH 2 TIMES A DAY     TIAZAC 120 MG extended release capsule  Generic drug:  diltiazem     TUDORZA PRESSAIR 400 MCG/ACT Aepb inhaler  Generic drug:  aclidinium  INHALE 1 PUFF BY MOUTH 2 TIMES A DAY     vitamin D 63265 UNIT Caps  Commonly known as:  CHOLECALCIFEROL  Take 1 capsule by mouth once a week           Where to Get Your Medications      These medications were sent to [address] [place] [address], [address] [place] [address]    Phone:  [phone]   · doxycycline hyclate 100 MG tablet  · furosemide 40 MG tablet  · predniSONE 20 MG tablet           Discharged in stable condition to home     Follow Up: Follow up with PCP in 1 week       GISELE Moss.

## 2019-06-27 NOTE — PROGRESS NOTES
Progress Note    Admit Date:  6/23/2019    Subjective:  Mr. David Mae feels the same    Objective:   Patient Vitals for the past 4 hrs:   BP Temp Temp src Pulse Resp SpO2 Weight   06/27/19 0803 (!) 175/83 98 °F (36.7 °C) Oral 87 17 97 % --   06/27/19 0722 -- -- -- -- -- 97 % --   06/27/19 0500 -- -- -- -- -- -- 162 lb 1.6 oz (73.5 kg)            Intake/Output Summary (Last 24 hours) at 6/27/2019 0830  Last data filed at 6/27/2019 0500  Gross per 24 hour   Intake 960 ml   Output 1275 ml   Net -315 ml       Physical Exam:    Gen: No distress. Alert. Eyes: PERRL. No sclera icterus. No conjunctival injection. ENT: No discharge. Pharynx clear. Neck: No JVD. Trachea midline. Resp: Diminished air entry throughout. No accessory muscle use. No crackles. + scattered wheezes. No rhonchi. CV: Regular rate. Regular rhythm. No murmur. No rub. ++ edema. GI: Non-tender. Non-distended. Normal bowel sounds. Skin: Warm and dry. No nodule on exposed extremities. No rash on exposed extremities. M/S: No cyanosis. No joint deformity. No clubbing. Neuro: Awake. Grossly nonfocal    Psych: Oriented x 3. No anxiety or agitation.        Scheduled Meds:   albuterol sulfate HFA  2 puff Inhalation Q4H WA    furosemide  40 mg Intravenous BID    aspirin  81 mg Oral Daily    atorvastatin  40 mg Oral Daily    diltiazem  120 mg Oral Daily    DULoxetine  60 mg Oral Daily    sodium chloride flush  10 mL Intravenous 2 times per day    enoxaparin  40 mg Subcutaneous Daily    predniSONE  40 mg Oral Daily    oxyCODONE  10 mg Oral Q12H       Continuous Infusions:      PRN Meds:  ipratropium-albuterol, sodium chloride, albuterol sulfate HFA, sodium chloride flush, magnesium hydroxide, ondansetron, oxyCODONE-acetaminophen      Data:  CBC:   Recent Labs     06/25/19  0443 06/26/19  0534   WBC 16.0* 12.7*   HGB 10.5* 10.3*   HCT 32.3* 31.0*   .5* 103.3*    232     BMP:   Recent Labs     06/25/19  0443 06/26/19  0534 weight loss     #Non obstructive CAD  #HTN  - cont cardizem, lipitor, asa     #Chronic pain with opiate dependence  - home meds continued     DVT Prophylaxis: Lovenox   Diet: DIET CARDIAC;  Code Status: Full Code    D/c home     GISELE Moss.

## 2019-06-27 NOTE — PROGRESS NOTES
Pulmonary Progress Note  CC: SOB, COPD    Subjective:    breathing better    EXAM: BP (!) 175/83   Pulse 87   Temp 98 °F (36.7 °C) (Oral)   Resp 17   Ht 5' 6\" (1.676 m)   Wt 162 lb 1.6 oz (73.5 kg)   SpO2 96%   BMI 26.16 kg/m²  on 3lpm  Constitutional:  No acute distress. Eyes: PERRL. Conjunctivae anicteric. ENT: Normal nose. Normal tongue. Neck:  Trachea is midline. No thyroid tenderness. Respiratory: No accessory muscle usage. decreased breath sounds. No wheezes. No rales. No Rhonchi. Cardiovascular: Normal S1S2. No digit clubbing. No digit cyanosis. No LE edema. Psychiatric: No anxiety or Agitation. Alert and Oriented to person, place and time. Scheduled Meds:   albuterol sulfate HFA  2 puff Inhalation Q4H WA    furosemide  40 mg Intravenous BID    aspirin  81 mg Oral Daily    atorvastatin  40 mg Oral Daily    diltiazem  120 mg Oral Daily    DULoxetine  60 mg Oral Daily    sodium chloride flush  10 mL Intravenous 2 times per day    enoxaparin  40 mg Subcutaneous Daily    predniSONE  40 mg Oral Daily    oxyCODONE  10 mg Oral Q12H     Continuous Infusions:    PRN Meds:  ipratropium-albuterol, sodium chloride, albuterol sulfate HFA, sodium chloride flush, magnesium hydroxide, ondansetron, oxyCODONE-acetaminophen    Labs:  CBC:   Recent Labs     06/25/19  0443 06/26/19  0534   WBC 16.0* 12.7*   HGB 10.5* 10.3*   HCT 32.3* 31.0*   .5* 103.3*    232     BMP:   Recent Labs     06/25/19  0443 06/26/19  0534 06/27/19  0515    138 141   K 4.7 4.4 3.9   CL 92* 90* 91*   CO2 38* 39* 44*   BUN 25* 23* 28*   CREATININE 0.7* 0.6* 0.7*        Chest imaging was reviewed by me and showed stable cXR     ASSESSMENT:  · COPD exacerbation  · Chronic hypoxia  · Chronic narcotic use  · Tobacco abuse  · LE edema     PLAN:  · Supplemental oxygen to maintain SaO2 >92%; wean as tolerated  · Intensive inhaled bronchodilator therapy.   · Prednisone taper   · Smoking cessation advised  · [facility] for dc.  Will arrange f/u

## 2019-06-27 NOTE — FLOWSHEET NOTE
06/27/19 0804   Pain Assessment   Pain Assessment 0-10   Pain Level 7   Pain Location Back   Pain Orientation Lower   Pain Descriptors Aching;Constant; Discomfort   Pain Onset On-going   Pain Frequency Continuous   Functional Pain Assessment Prevents or interferes some active activities and ADLs   Pain Type Chronic pain   Clinical Progression Gradually worsening     Pain as shown above. PRN Percocet given per [facility] order. Pt. Assessment completed- see flow sheet. Pt. denies further needs at this time. Will continue to monitor. Call light is within reach.   Madonna Cross RN

## 2019-06-28 NOTE — CARE COORDINATION
Agnes [facility] Transitions Initial Follow Up Call    Call within 2 business days of discharge: Yes    Patient: Magdaleno Lefort Patient :    MRN: [id number]  Reason for Admission: aeCOPD readmission  Discharge Date: 19 RARS: Readmission Risk Score: 26      Last Discharge [facility]       Complaint Diagnosis Description Type Department Provider    19 Shortness of Breath COPD with acute exacerbation ([facility] [address] . .. ED to Hosp-Admission (Discharged) (ADMITTED) Sarai Duarte U Rachel Rhoades MD; Jan Vines. .. Spoke with: Jesus Bautista (patient)    Facility: [place]    Non-face-to-face services provided:  Obtained and reviewed discharge summary and/or continuity of care documents  Education of patient/family/caregiver/guardian to support self-management-s/s to report to [facility]. or MD  Assessment and support for treatment adherence and medication management-1111F completed    Care Transitions 24 Hour Call    Schedule Follow Up Appointment with PCP:  Completed  Do you have any ongoing symptoms?:  No  Do you have a copy of your discharge instructions?:  Yes  Do you have all of your prescriptions and are they filled?:  Yes  Have you been contacted by a [employer] [address]?:  No  Have you scheduled your follow up appointment?:  Yes  How are you going to get to your appointment?:  Car - drive self  Were you discharged with any Home Care or Post Acute Services:  Yes  Post Acute Services:  Home Health (Comment: [address])  Patient DME:  Shower chair  Patient Home Equipment:  Oxygen, Nebulizer  Do you have support at home?:  Other Caregiver  Do you feel like you have everything you need to keep you well at home?:  Yes  Are you an active caregiver in your home?:  No  Care Transitions Interventions   Medication Assistance Program:  Completed          Jimwm Mcghee reports he feels great. Reviewed medications. He is not and will not use his nebulizer as it causes severe anxiety.  He uses his inhalers instead and insists he gets great results. States RN from [facility] was out this morning and filled his mediset and gave him his first dose of day. He feels much improved. Declines needs/concerns. Has [facility] contact number for prn issues and was encouraged to call. Has follow up with pulm as below. Care transition following. Pharmacist contacted writer to ask if patient mentioned taking losartan. He did not. Per her medication review he states he is taking losartan which was discontinued in May. His mediset is filled weekly by [address] Writer called to [facility], spoke with Francois Recinos who reports that patient scheduled to be seen today by Charbel Farah RN and if she has already seen this patient she cannot see the notes yet. Francois Recinos reviewed patients record and confirms they have no medication record with active losartan dating back to TCM visit with PCP on 5/21. Francois Recinos will reach out to [facility] and have her follow up that medications are correct in patient home. Provided pharmacist Cee with Mimi Olvera RN number for questions. Care transition following.      Follow Up  Future Appointments   Date Time Provider Joanne Kessler   7/2/2019  1:20 PM Yamila Madrid MD [facility] PULM [facility]   7/11/2019 11:20 AM SAKSHI Phillips - CNP [facility]   7/30/2019  1:00 PM MD Negrita Corley [facility]       Nohemi Harley RN

## 2019-06-28 NOTE — TELEPHONE ENCOUNTER
CLINICAL PHARMACY NOTE  Post-Discharge Transitions of Care (CHERY)    Non-face-to-face services provided:  Assessment and support for treatment adherence and medication management-medication reconciliation    Subjective/Objective:  Dao Mejia is a 75 y.o. male. Patient was discharged from [facility] on 6/27/19 with a diagnosis of COPD exacerbation. Patient outreach to review discharge medications and provide medication review and management. Spoke with patient    No Known Allergies    Discharge Medications (as per discharging medication list): There are NEW medications for you. START taking them after you leave the hospital   doxycycline hyclate (VIBRA-TABS) 100 MG tablet  · Taking, no issues to report. Take 1 tablet by mouth 2 times daily for 4 days     You told us you were taking these medications at home, but the amount or how often you take this medication has CHANGED   predniSONE (DELTASONE) 20 MG tablet  · Taking, no issues to report. Pt familiar with taper dosing regimen, takes all tabs in the morning. 3 tabs po qam for 3 days then 2 tabs qam for 3 days the 1 tab qam for 3 days    furosemide (LASIX) 40 MG tablet  · Dose increased from 40 mg daily prn to 40 mg BID. Instructed pt to take evening dose around 5 pm.  1 bid     These are medications you told us you were taking at home, CONTINUE taking them after you leave the hospital   Medication Sig    oxyCODONE (OXYCONTIN) 10 MG extended release tablet Take 10 mg by mouth every 12 hours.     potassium chloride (KLOR-CON M) 20 MEQ extended release tablet Take 1 tablet by mouth daily    TUDORZA PRESSAIR 400 MCG/ACT AEPB inhaler INHALE 1 PUFF BY MOUTH 2 TIMES A DAY    DULoxetine (CYMBALTA) 60 MG extended release capsule Take 1 capsule by mouth daily    SYMBICORT 160-4.5 MCG/ACT AERO INHALE 2 PUFFS BY MOUTH 2 TIMES A DAY    fluticasone (FLONASE) 50 MCG/ACT nasal spray  · Only uses as needed for rhinitis USE 2 SPRAYS IN EACH NOSTRIL DAILY    states that he is taking this at home. This was stopped after discharge on 19. Instructed pt to stop taking this medication. Pt states understanding. · Category 4 (1):  1. Vitamin D - patient admits that he hasn't taken this medication in over 6 months. Likely needs repeat Vitamin D level. Removed from home medication list as this prescription has . - [facility] active medication list updated:  · Medications Added (0)  · Medications Removed (1):  Vitamin D  · Medications Changed (0)    - Identified Potential Medication Interactions: No clinically significant interactions identified via [employer] Interaction Analysis as category D or higher.    - Renal Dosing: No renal adjustments necessary.    - Follow up appointment date (7 days for more severe illness, 14 days for others):    · Patient reminded of upcoming appointment with pulmonologist on 19 and PCP on 19.      Thank you,    Pedro Granados, PharmD, [address] [facility]  Direct: [phone]  Department, toll free [phone], option 7          For Pharmacy Admin Tracking Only    TCM Call Made?: Yes  [facility] Select Patient?: Yes  Total # of Interventions Recommended: 2 -   - Discontinued Medication #: 1  - Updated Order #: 1  Total # Interventions Accepted: 2  Intervention Severity:   - Level 1 Intervention Present?: No   - Level 2 #: 0   - Level 3 #: 1  Outreach Status: Review Complete  Care Coordinator Outreach to Patient?: Yes  Provider Contacted?: No  Time Spent (min): 45

## 2019-06-28 NOTE — TELEPHONE ENCOUNTER
Patient is scheduled for PFT/6 MWT and follow up with  7/30/19. Order is pending please review and sign.

## 2019-07-06 NOTE — ED PROVIDER NOTES
abnormality. No acute abnormality is detected. Vl Extremity Venous Bilateral    Result Date: 6/26/2019  Lower Extremities DVT Study  Demographics   Patient Name       Christine Greco   Date of Study      06/25/2019         Gender              Male   Patient Number     [id number]         Date of Birth       1945   Visit Number       [id number]          Age                 75 year(s)   Accession Number   [id number]          Room Number            Corporate ID                     Sonographer         Juan Carlos Louise RVT   Ordering Physician Saulo Phoenix,  Interpreting        [facility]                     Migdalia Hinkle PA-C      Physician           Readers                                                            Clarisse Sargent MD  Procedure Type of Study:   Veins:Lower Extremities DVT Study, VASC EXTREMITY VENOUS DUPLEX BILATERAL. Vascular Sonographer Report  Indications for Study:Edema. Impressions Right Impression 1. Technically limited exam due to edema. There is complete compressibility of all deep and superficial veins seen throughout the lower extremity. 2. There is normal spontaneous and phasic flow throughout the deep and superficial veins of the right lower extremity. 3. Incidental finding of a cystic structure at the medial joint space measuring 2.8 X 1.2 cm. Left Impression 1. Technically limited exam due to edema. There is complete compressibility of all deep and superficial veins seen throughout the lower extremity. 2. There is normal spontaneous and phasic flow throughout the deep and superficial veins of the left lower extremity. Conclusions   Summary   1. Within the limits of this exam, there is no evidence of deep or  superficial venous thrombosis in the lower extremities bilaterally. 2. Incidental finding of a Baker's cyst at the right medial joint space  measuring 2.8 X 1.2 cm.    Signature ------------------------------------------------------------------  Electronically signed by Deandre Pichardo MD (Interpreting  physician) on 06/26/2019 at 08:18 AM  ------------------------------------------------------------------  Patient Status:Routine. Study [address] - Vascular Lab. Technical Quality:Limited visualization due to swelling. Risk Factors History +---------+----------+-------------------------------------------------------+ ! Diagnosis! Date      ! Comments                                               ! +---------+----------+-------------------------------------------------------+ ! Other    !06/25/2019! Patient presented to ED for SOB. States legs have been ! !         !          !swollen for 3 weeks. H/o COPD. Limited valsalva. ! +---------+----------+-------------------------------------------------------+   - The patient's risk factor(s) include: chronic lung disease, dyslipidemia     and arterial hypertension.   - The patient has a current/recent (within 1 year) tobacco history. Velocities are measured in cm/s ; Diameters are measured in mm Right Lower Extremities DVT Study Measurements Right 2D Measurements +------------------------+----------+---------------+----------+ ! Location                ! Visualized! Compressibility! Thrombosis! +------------------------+----------+---------------+----------+ ! Sapheno Femoral Junction! Yes       ! Yes            ! None      ! +------------------------+----------+---------------+----------+ ! GSV Thigh               ! Yes       ! Yes            ! None      ! +------------------------+----------+---------------+----------+ ! Common Femoral          !Yes       ! Yes            ! None      ! +------------------------+----------+---------------+----------+ ! Prox Femoral            !Yes       ! Yes            ! None      ! +------------------------+----------+---------------+----------+ ! Mid Femoral             !Yes       ! Yes            ! None ! +------------------------+----------+---------------+----------+ ! Dist Femoral            !Yes       ! Yes            ! None      ! +------------------------+----------+---------------+----------+ ! Deep Femoral            !Yes       ! Yes            ! None      ! +------------------------+----------+---------------+----------+ ! Popliteal               !Yes       ! Yes            ! None      ! +------------------------+----------+---------------+----------+ ! GSV Below Knee          ! Yes       ! Yes            ! None      ! +------------------------+----------+---------------+----------+ ! Gastroc                 ! Yes       ! Yes            ! None      ! +------------------------+----------+---------------+----------+ ! PTV                     ! Partial   !Yes            ! None      ! +------------------------+----------+---------------+----------+ ! Peroneal                !Partial   !Yes            ! None      ! +------------------------+----------+---------------+----------+ ! GSV Calf                ! Yes       ! Yes            ! None      ! +------------------------+----------+---------------+----------+ ! SSV                     ! Yes       ! Yes            ! None      ! +------------------------+----------+---------------+----------+ Right Doppler Measurements +------------------------+------+------+------------+ ! Location                ! Signal!Reflux! Reflux (sec)! +------------------------+------+------+------------+ ! Sapheno Femoral Junction! Phasic! No    !            ! +------------------------+------+------+------------+ ! Common Femoral          !Phasic! No    !            ! +------------------------+------+------+------------+ ! Prox Femoral            !Phasic! No    !            ! +------------------------+------+------+------------+ ! Deep Femoral            !Phasic! No    !            ! +------------------------+------+------+------------+ ! Popliteal               !Phasic! No    !            ! +------------------------+------+------+------------+ Left Lower Extremities DVT Study Measurements Left 2D Measurements +------------------------+----------+---------------+----------+ ! Location                ! Visualized! Compressibility! Thrombosis! +------------------------+----------+---------------+----------+ ! Sapheno Femoral Junction! Yes       ! Yes            ! None      ! +------------------------+----------+---------------+----------+ ! GSV Thigh               ! Yes       ! Yes            ! None      ! +------------------------+----------+---------------+----------+ ! Common Femoral          !Yes       ! Yes            ! None      ! +------------------------+----------+---------------+----------+ ! Prox Femoral            !Yes       ! Yes            ! None      ! +------------------------+----------+---------------+----------+ ! Mid Femoral             !Yes       ! Yes            ! None      ! +------------------------+----------+---------------+----------+ ! Dist Femoral            !Yes       ! Yes            ! None      ! +------------------------+----------+---------------+----------+ ! Deep Femoral            !Yes       ! Yes            ! None      ! +------------------------+----------+---------------+----------+ ! Popliteal               !Yes       ! Yes            ! None      ! +------------------------+----------+---------------+----------+ ! GSV Below Knee          ! Yes       ! Yes            ! None      ! +------------------------+----------+---------------+----------+ ! Gastroc                 ! Yes       ! Yes            ! None      ! +------------------------+----------+---------------+----------+ ! PTV                     ! Partial   !Yes            ! None      ! +------------------------+----------+---------------+----------+ ! Peroneal                !Partial   !Yes            ! None      ! +------------------------+----------+---------------+----------+ ! GSV Calf                ! Yes       ! Yes            ! None      !

## 2019-07-07 PROBLEM — J96.22 ACUTE ON CHRONIC RESPIRATORY FAILURE WITH HYPERCAPNIA (HCC): Status: RESOLVED | Noted: 2019-01-01 | Resolved: 2019-01-01

## 2019-07-07 NOTE — PROGRESS NOTES
Patient adamantly refuse to take [facility], insisting on MDI. Explained that generally, patient admitted with exacerbations are too SOB to effectively use MDI initially. He was insistent on MDI and his respiratory status was much improved. Patient converted to albuterol/atrovent mdi, per protocol. Patient demonstrated effective MDI technique.

## 2019-07-07 NOTE — PROGRESS NOTES
.Inpatient Occupational Therapy  Evaluation and Treatment    Unit: ICU  Date:  7/7/2019  Patient Name:    Aretha Bravo  Admitting diagnosis:  COPD with acute exacerbation [facility]) [J44.1]  Admit Date:  7/6/2019  Precautions/Restrictions/WB Status/ Lines/ Wounds/ Oxygen: fall risk, IV, bed/chair alarm, toscano catheter , supplemental O2 (2.5L), confusion and ICU monitoring    Treatment Time:  9735-3285  Treatment Number: 1   Billable Treatment Time: 28 minutes   Total Treatment Time:   38   minutes    Patient Goals for Therapy:  None reported       Discharge Recommendations: SNF  DME needs for discharge: defer to facility       Therapy recommendations for staff:   Assist of 2 with use of No AD for all transfers to/from BSC/chair      Home Health S4 Level Recommendation:  NA  AM-PAC Score: 13    Copied from admission 6/ 2019, edited as needed : (Pt became frustrated with questioning. Per RN report, sister no longer lives with pt. She lives 2 hours away and attempts to care for pt from a distance.)   Preadmission Environment    Patient states he lives alone in a 1 story ranch with 3 ABDOULAYE and no hand rail   Bathroom: Walk in [address] [place] [address], [address] [place] [address] bars, Shower Chair  and Standard height toilet  Equipment owned: Rolling Walker, Shower Chair, home O2 (2.5 L) PRN and pulse ox, inhaler, nebulizer      Preadmission Status:  Pt. Able to drive: Yes  Pt Fully independent with ADLs: Yes  Pt. Required assistance from family for: Independent PTA  Pt. Fully independent for transfers and gait and walked with No Device  History of falls Yes, reports falling during admission to [facility] approx. 2 months ago       Pain  None reported     Cognition    A&O Person and Place  (hospital only, not name)   Able to follow 1 step commands    Subjective  Patient lying supine in bed with no family present  Pt agreeable to this OT eval & tx. Upper Extremity ROM:    WFL,  pt able to perform all bed mobility, transfers, and gait without ROM limitation.     Upper

## 2019-07-07 NOTE — PROGRESS NOTES
Latest Ref Range: >92 % 98.0 98.1 96.8   O2 Content, Arterial Latest Ref Range: Not Established mL/dL 16 16 16   Methemoglobin, Arterial Latest Ref Range: <1.5 % 0.3 0.2 0.2   Carboxyhgb, Arterial Latest Ref Range: 0.0 - 1.5 % 2.8 (H) 2.7 (H) 1.7 (H)       Assessment:    Principal Problem:    Acute and chronic respiratory failure with hypercapnia (HCC)  Active Problems:    Chronic low back pain    Coronary artery disease due to lipid rich plaque    Reactive depression    Mixed hyperlipidemia    Benign essential hypertension    COPD with acute exacerbation (HCC)    Centrilobular emphysema (HCC)  Resolved Problems:    * No resolved hospital problems. *         Plan:    #Acute hypercarbic respiratory failure. Patient's work-up is consistent with acute hypercarbic respiratory failure. He was admitted to the ICU. He was placed on BiPAP. He is improved. He is back on 2 and half liters of oxygen. Pulmonary consultation is obtained. #Acute exacerbation of COPD. Use handheld nebulizers and steroids. #Hypertension. Continue Cardizem CD. He does have some pedal edema which may be related to either chronic cor pulmonale or to the Cardizem. He is already on p.o. Lasix. #Reactive depression. Continue with Cymbalta. #Continue Lipitor for hyperlipidemia. #Continue OxyContin for chronic back pain. #Coronary artery disease. Stable. No chest pain. Continue aspirin. #Lovenox for DVT prophylaxis. Transfer to [facility].    All questions and concerns were addressed to the patient/family. Alternatives to my treatment were discussed. The note was completed using EMR. Every effort was made to ensure accuracy; however, inadvertent computerized transcription errors may be present.          Amie Brown 8:02 AM 7/7/2019

## 2019-07-07 NOTE — PROGRESS NOTES
4 Eyes Skin Assessment     The patient is being assess for   Admission    I agree that 2 RN's have performed a thorough Head to Toe Skin Assessment on the patient. ALL assessment sites listed below have been assessed. Areas assessed by both nurses:   [x]   Head, Face, and Ears   [x]   Shoulders, Back, and Chest, Abdomen  [x]   Arms, Elbows, and Hands   [x]   Coccyx, Sacrum, and Ischium  [x]   Legs, Feet, and Heels        Scabbing on both elbows, non blanchable redness area on the coccyx/sacral area, scattered bruising throughout the body. **SHARE this note so that the co-signing nurse is able to place an eSignature**    Co-signer eSignature: Electronically signed by Hood Chawla RN on 7/6/19 at 11:35 PM    Does the Patient have Skin Breakdown?   No          Kennedy Prevention initiated:  Yes   Wound Care Orders initiated:  No      [facility] nurse consulted for Pressure Injury (Stage 3,4, Unstageable, DTI, NWPT, Complex wounds)and New or Established Ostomies:  No      Primary Nurse eSignature: Electronically signed by Ric Rico RN on 7/6/19 at 11:07 PM

## 2019-07-07 NOTE — DISCHARGE INSTR - COC
Continuity of Care Form    Patient Name: Ruby Elam   :    MRN:  [id number]    Admit date:  2019  Discharge date:  19    Code Status Order: Full Code   Advance Directives:   Advance Care Flowsheet Documentation     Date/Time Healthcare Directive Type of Healthcare Directive Copy in [address] Agent's Name Healthcare Agent's Phone Number    [phone]  Unknown, patient unable to respond due to medical condition -- -- -- -- --          Admitting Physician:  Sushma Mojica MD  PCP: Thuy Vyas MD    Discharging Nurse: [address] [facility] Unit/Room#: 0217/0217-01  Discharging Unit Phone Number: [phone]    Emergency Contact:   Extended Emergency Contact Information  Primary Emergency Contact: [address] Phone: [phone]  Relation: Child  Secondary Emergency Contact: [address] Phone: [phone]  Relation: Brother/Sister    Past Surgical History:  Past Surgical History:   Procedure Laterality Date    AMPUTATION      COLONOSCOPY  2016    colonic polyps    HERNIA REPAIR         Immunization History:   Immunization History   Administered Date(s) Administered    Influenza 2013    Influenza Virus Vaccine 10/15/2012, 2014, 2015    Influenza, Elijah Del Real, 3 Years and older, IM (Fluzone 3 yrs and older or Afluria 5 yrs and older) 2017, 10/13/2017, 2018    Pneumococcal Conjugate 13-valent (Corry Solid) 2014, 2015, 2017    Pneumococcal Polysaccharide ([id number]) 10/11/2016    Tdap (Boostrix, Adacel) 2007       Active Problems:  Patient Active Problem List   Diagnosis Code    Coronary artery disease involving native coronary artery of native heart without angina pectoris I25.10    OA (osteoarthritis) M19.90    Hyperglycemia R73.9    Chronic pain syndrome G89.4    Chronic nasal congestion R09.81    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    Smoker's respiratory Therapy:  Wound 06/14/19 Heel Right 2cm x 2 cm , blood blister looking, purple in color is intact (Active)   Wound Deep tissue/Injury 7/6/2019 11:32 PM   Dressing Status Clean;Dry; Intact 7/7/2019 12:00 PM   Dressing Changed Changed/New 7/6/2019 11:32 PM   Dressing/Treatment Foam 7/7/2019 12:00 PM   Wound Cleansed Rinsed/Irrigated with saline 6/14/2019 12:40 PM   Wound Length (cm) 2 cm 7/6/2019 11:32 PM   Wound Width (cm) 2 cm 7/6/2019 11:32 PM   Wound Surface Area (cm^2) 4 cm^2 7/6/2019 11:32 PM   Change in Wound Size % (l*w) 0 7/6/2019 11:32 PM   Wound Assessment Purple 7/6/2019 11:32 PM   Purple%Wound Bed 100 6/25/2019  9:36 AM   Number of days: 23       Wound 07/06/19 Coccyx stage 1 nonblanchable area (Active)   Wound Pressure Stage  1 7/6/2019 11:32 PM   Dressing Status Clean;Dry; Intact 7/7/2019 12:00 PM   Dressing Changed Changed/New 7/6/2019 11:32 PM   Dressing/Treatment Foam 7/7/2019 12:00 PM   Number of days: 0        Elimination:  Continence:   · Bowel: Yes  · Bladder: Yes  Urinary Catheter: None   Colostomy/Ileostomy/Ileal Conduit: No       Date of Last BM: 7/7/19    Intake/Output Summary (Last 24 hours) at 7/7/2019 1704  Last data filed at 7/7/2019 0600  Gross per 24 hour   Intake 771 ml   Output 350 ml   Net 421 ml     I/O last 3 completed shifts: In: 703 [P.O.:240; I.V.:531]  Out: 350 [Urine:350]    Safety Concerns: At Risk for Falls    Impairments/Disabilities:      None    Nutrition Therapy:  Current Nutrition Therapy:   - Oral Diet:  General    Routes of Feeding: Oral  Liquids: No Restrictions  Daily Fluid Restriction: no  Last Modified Barium Swallow with Video (Video Swallowing Test): not done    Treatments at the Time of Hospital Discharge:   Respiratory Treatments:   Oxygen Therapy:  is on oxygen at 2.5/3 L/min per nasal cannula.   Ventilator:    - No ventilator support    Rehab Therapies: Physical Therapy, Occupational Therapy and skilled nursing  Weight Bearing Status/Restrictions: No

## 2019-07-07 NOTE — PROGRESS NOTES
07/06/19 2247   NIV Type   $NIV $Daily Charge   Equipment Type v60   Mode BIPAP   Mask Type Full face mask   Mask Size Medium   Settings/Measurements   Comfort Level Good   Using Accessory Muscles No   CPAP 16 cmH2O   IPAP 8 cmH20   Rate Ordered 16   Resp 20   SpO2 100   FiO2  40 %   I Time/ I Time % 0.9 s   Vt Exhaled 640 mL   Mask Leak (lpm) 31 lpm   Skin Protection for O2 Device Yes

## 2019-07-07 NOTE — H&P
continue HHN's. IV solumedrol  - continue NIV, repeat abg with pCO2 trending down    2) Edema  - had negative venous doppler 6/25/2019 for DVT  - continue lasix    3) HTN  - continue home medications    DVT Prophylaxis: lovenox  Diet: DIET GENERAL;  Code Status: Full Code     Dispo - icu  Tot crit care time > 35 min       Ellyn Rico MD    Thank you Bimal Juarez MD for the opportunity to be involved in this patient's care. If you have any questions or concerns please feel free to contact me at [phone].

## 2019-07-07 NOTE — ED NOTES
Report received from [address] [place] [address], Via Tenzin Rees [address] [place] [address]  07/06/19 2025

## 2019-07-07 NOTE — CONSULTS
Patient is being seen at the request of Dr. Dagoberto Reyes for a consultation for COPD exacerbation    HISTORY OF PRESENT ILLNESS:   75years old with history of COPD presented with 3 days of progressive shortness of breath. Dyspnea worse with exertion and better with resting. Severe. Associated with nonproductive cough. Found to be hypercapnic and respiratory distress in ED placed on BiPAP and admitted to the ICU for further evaluation. Patient was discharged 6/27/2019 after admission for COPD acute exacerbation completed a course of prednisone taper. BiPAP 4 hrs overnight and took it off. Smoker still 1 ppd. Patient is compliant with inhaled bronchodilators and O2. No sick contact. PAST MEDICAL HISTORY:  Past Medical History:   Diagnosis Date    Amputation finger     Ankle fracture, left     Chronic nasal congestion     Chronic pain syndrome     Compression fracture of lumbar vertebra (HCC)     COPD (chronic obstructive pulmonary disease) (HCC)     DDD (degenerative disc disease)     Depression     Facet joint disease of lumbosacral region     Hyperglycemia     Hyperlipidemia     Insomnia     Radiculopathy     Smoker's respiratory syndrome      PAST SURGICAL HISTORY:  Past Surgical History:   Procedure Laterality Date    AMPUTATION      COLONOSCOPY  05/02/2016    colonic polyps    HERNIA REPAIR         FAMILY HISTORY:  family history includes Cancer in his mother; Diabetes in his father; Heart Disease in his sister. SOCIAL HISTORY:   reports that he has been smoking cigarettes. He has a 50.00 pack-year smoking history.  He has never used smokeless tobacco.    Scheduled Meds:   ipratropium-albuterol  1 ampule Inhalation Q4H    aspirin  81 mg Oral Daily    atorvastatin  40 mg Oral Daily    diltiazem  120 mg Oral Daily    DULoxetine  60 mg Oral Daily    fluticasone  2 spray Each Nostril Daily    furosemide  40 mg Oral BID    oxyCODONE  10 mg Oral Q12H    potassium chloride  20

## 2019-07-08 NOTE — PROGRESS NOTES
Internal Medicine Progress Note      Events of Last 24 hours:     Admitted for acute hypercarbic respiratory failure. He was place on bipap and improved. This am he is back on 2.5 L of oxygen. He feels better. Invasive Lines: PIV     MV:  n/a    Recent Labs     19  2240 19  0232   PHART 7.452* 7.451*   KVW5CQN 72.0* 69.2*   PO2ART 111.6* 89.2       MV Settings:     / / /FiO2 : 25 %    IV:      Vitals:  Temp  Av.3 °F (36.8 °C)  Min: 97.8 °F (36.6 °C)  Max: 98.5 °F (36.9 °C)  Pulse  Av.7  Min: 85  Max: 119  BP  Min: 135/87  Max: 156/94  SpO2  Av.5 %  Min: 96 %  Max: 100 %  Patient Vitals for the past 4 hrs:   BP Temp Temp src Pulse Resp SpO2   19 0820 135/87 97.8 °F (36.6 °C) Oral 119 19 97 %       CVP:        Intake/Output Summary (Last 24 hours) at 2019 1017  Last data filed at 2019 1008  Gross per 24 hour   Intake 480 ml   Output 1925 ml   Net -1445 ml       EXAM:  General: Elderly white male. Eyes: PERRL. No sclera icterus. No conjunctiva injected. ENT: No discharge. Pharynx clear. Neck: Trachea midline. Normal thyroid. Resp: No accessory muscle use. No crackles. Mild wheezing. No rhonchi. No dullness on percussion. CV: Regular rate. Regular rhythm. No mumur or rub. + edema. No JVD. Palpable pedal pulses. GI: Non-tender. Non-distended. No masses. No organmegaly. Normal bowel sounds. No hernia. Skin: Warm and dry. No nodule on exposed extremities. No rash on exposed extremities. Lymph: No cervical LAD. No supraclavicular LAD. M/S: No cyanosis. No joint deformity. No clubbing. Neuro: Awake. Follows commands. Positive pupils/gag/corneals. Normal pain response. Psych: Oriented to person, place, time. No anxiety or agitation.      Medications:  Scheduled Meds:   azithromycin  250 mg Oral Daily    mupirocin   Nasal BID    albuterol-ipratropium  1 puff Inhalation Q4H While awake    aspirin  81 mg Oral Daily    atorvastatin  40 mg Oral Daily    diltiazem 16 16 16   Methemoglobin, Arterial Latest Ref Range: <1.5 % 0.3 0.2 0.2   Carboxyhgb, Arterial Latest Ref Range: 0.0 - 1.5 % 2.8 (H) 2.7 (H) 1.7 (H)       Assessment:    Principal Problem:    Acute and chronic respiratory failure with hypercapnia (HCC)  Active Problems:    Chronic low back pain    Coronary artery disease due to lipid rich plaque    Reactive depression    Mixed hyperlipidemia    Benign essential hypertension    COPD with acute exacerbation (HCC)    Centrilobular emphysema (HCC)    Acute hypercapnic respiratory failure (HCC)    Lower extremity edema    Tobacco abuse  Resolved Problems:    * No resolved hospital problems. *         Plan:    #Acute on chronic hypercarbic respiratory failure. Patient's work-up is consistent with acute hypercarbic respiratory failure. He was admitted to the ICU. He was placed on BiPAP. He is improved. He is back on 2 and half liters of oxygen. Pulmonary consultation is obtained. #Acute exacerbation of COPD. Use handheld nebulizers and steroids. #Hypertension. Continue Cardizem CD. He does have some pedal edema which may be related to either chronic cor pulmonale or to the Cardizem. He is already on p.o. Lasix. #Reactive depression. Continue with Cymbalta. #Continue Lipitor for hyperlipidemia. #Continue OxyContin for chronic back pain. #Coronary artery disease. Stable. No chest pain. Continue aspirin. Chronic opioid dependence    #Lovenox for DVT prophylaxis.   D/c home        Jannifer Heimlich 10:17 AM 7/8/2019

## 2019-07-08 NOTE — PROGRESS NOTES
conservation. The pt was given written materials for energy conservation. Recommend home OT and home assist for laundry,cleaning  shopping and supervision for showers. GOALS  To be met in 3 Visits:  1). Bed to toilet: Min A     To be met in 5 Visits:  1). Supine to Sit: Modified Independent  2). Upper Body Bathing:  Min A  3). Lower Body Bathing:  Min A  4). Upper Body Dressing: Min A  5). Lower Body Dressing: Min A- goal met- pt is IND   6).  Pt to monika UE exs x 15 reps      Plan: cont with JA Lamb OTR/L [id number]      If patient discharges from this facility prior to next visit, this note will serve as the Discharge Summary

## 2019-07-08 NOTE — CARE COORDINATION
DISCHARGE ORDER  Date/Time 2019 12:10 PM  Completed by: Chloe Cardoso, Case Management    Patient Name: Clary Soulier    :   Admitting Diagnosis: COPD with acute exacerbation ([facility] [address] [J44.1]  Admit Date/Time: 2019  5:17 PM    Noted discharge order. Confirmed discharge plan with patient / family (pt): Yes   Discharge Plan: Chart reviewed and role of dcp explained. Pt is returning home and states his brother is going to stay with him and provide 24/ care. Pt agreeable to [facility] with [facility] for SN/PT/OT/SW. TC to [facility] and she is aware pt will discharge home today. Pt requesting that I call Mayda Echevarria to setup his weekly delivery of O2 tanks for Wednesday. TC to [address] and spoke with Leidy, she states she will add pt to their Wednesday delivery schedule. Pt has his ride bringing O2 tank from home. No further dc needs voiced or identified. Discharge timeout done with [facility]. All discharge needs and concerns addressed.

## 2019-07-09 NOTE — CARE COORDINATION
[facility] Transitions Initial Follow Up Call    Call within 2 business days of discharge: Yes    Patient: Franco Osorio Patient :    MRN: [id number]  Reason for Admission: aeCOPD  Discharge Date: 19 RARS: Readmission Risk Score: 28      Last Discharge [facility]       Complaint Diagnosis Description Type Department Provider    19 Respiratory Distress COPD with acute exacerbation ([facility] [address] . .. ED to Hosp-Admission (Discharged) (ADMITTED) [facility] 2 Brett Bowman MD; Bernadette Moran. ..            Spoke with: Yoshi Puentesara (Patient)    Facility: [address]    Non-face-to-face services provided:  Obtained and reviewed discharge summary and/or continuity of care documents  Communication with home health agencies or other community services the patient is currently [facility]  Education of patient/family/caregiver/guardian to support self-management-s/s to report; monitor SaO2  Assessment and support for treatment adherence and medication management-med education    Care Transitions 24 Hour Call    Do you have any ongoing symptoms?:  No  Do you have a copy of your discharge instructions?:  Yes  Do you have all of your prescriptions and are they filled?:  No (Comment: states he will have someone take him to pharmacy hopefully today)  Have you been contacted by a [facility] Pharmacist?:  No  Have you scheduled your follow up appointment?:  Yes  How are you going to get to your appointment?:  Car - family or friend to transport  Were you discharged with any Home Care or Post Acute Services:  Yes  Post Acute Services:  Home Health (Comment: [address])  Patient DME:  Shower chair  Patient Home Equipment:  Oxygen, Nebulizer  Do you have support at home?:  Alone  Do you feel like you have everything you need to keep you well at home?:  Yes  Are you an active caregiver in your home?:  No  Care Transitions Interventions   Medication Assistance Program:  Completed          Piyush Mehta has not picked up new assist with medication review and set up.       Follow Up  Future Appointments   Date Time Provider Joanne Kessler   7/30/2019 11:00 AM [facility] None   7/30/2019  1:00 PM Aldair Dunn MD [facility]   7/30/2019  1:30 PM Jel[facility] Gibbons [facility]       Jaime Ley RN

## 2019-07-16 NOTE — CARE COORDINATION
[facility] Transitions Follow Up Call    2019    Patient: Reina Keita  Patient :    MRN: [id number]  Reason for Admission: aeCOPD  Discharge Date: 19 RARS: Readmission Risk Score: 29       Spoke with: [address] Transitions Subsequent and Final Call    Subsequent and Final Calls  Do you have any ongoing symptoms?:  Yes  Onset of Patient-reported symptoms:  Other  Patient-reported symptoms:  Other  Interventions for patient-reported symptoms:  Notified Home Care  Have your medications changed?:  No  Do you have any questions related to your medications?:  No  Do you currently have any active services?:  Yes  Are you currently active with any services?:  Home Health  Do you have any needs or concerns that I can assist you with?:  No  Identified Barriers:  Impairment  Care Transitions Interventions   Medication Assistance Program:  Completed     Other Interventions:          Fausto Robles reports breathing still improved. Denies SOB. None noted during conversation. Reports increased BLE edema. Taking prednisone taper still. States he always takes furosemide 40mg BID. States he has good UO after taking each dose. Has compression stockings. Encouraged him to don in AM. Educated him to elevate BLE above level of heart and perform ROM exercises. Reviewed diet and fluid. Instructed him to add no salt to foods and be aware of sodium content to decrease amount in diet. Instructed him to decrease fluid intake to <64 ounces/day. He verbalizes understanding. States he thinks home care nurse visit scheduled for tomorrow but is unsure. He needs TCM visit scheduled as well. Writer unable to schedule at this time as PCP unavailable. Patient instructed to call PCP office to schedule TCM visit. He confirms he has contact number. Writer will confirm nurse visit tomorrow with [facility]. Quan agreeable to plan. Call to [facility] and confirmed SN visit for tomorrow and had them note BLE edema concern. Spoke with Oh Teresa.

## 2019-07-18 PROBLEM — J96.00 ACUTE RESPIRATORY FAILURE (HCC): Status: ACTIVE | Noted: 2019-01-01

## 2019-07-18 PROBLEM — E44.0 MODERATE PROTEIN-CALORIE MALNUTRITION (HCC): Status: ACTIVE | Noted: 2019-01-01

## 2019-07-18 NOTE — ED PROVIDER NOTES
(FLONASE) 50 MCG/ACT nasal spray USE 2 SPRAYS IN EACH NOSTRIL DAILY 16 g 11    aspirin 81 MG tablet Take 81 mg by mouth daily      diltiazem (TIAZAC) 120 MG extended release capsule Take 120 mg by mouth daily      PROAIR  (90 Base) MCG/ACT inhaler INHALE 2 PUFFS BY MOUTH 4 TIMES DAILY WHEN AWAY FROM HOME 8.5 g 11    atorvastatin (LIPITOR) 40 MG tablet TAKE 1 TABLET BY MOUTH DAILY 30 tablet 11    oxyCODONE-acetaminophen (PERCOCET)  MG per tablet Take 1 tablet by mouth every 6 hours as needed for Pain.  Respiratory Therapy Supplies (NEBULIZER COMPRESSOR) KIT 1 kit by Does not apply route once for 1 dose 1 kit 0    ipratropium-albuterol (DUONEB) 0.5-2.5 (3) MG/3ML SOLN nebulizer solution Use qid 120 vial 11     No Known Allergies  Nursing Notes Reviewed    Physical Exam:  ED Triage Vitals [07/17/19 2233]   Enc Vitals Group      /73      Pulse 84      Resp 20      Temp 98.4 °F (36.9 °C)      Temp Source Oral      SpO2 99 %      Weight 158 lb (71.7 kg)      Height 5' 7\" (1.702 m)      Head Circumference       Peak Flow       Pain Score       Pain Loc       Pain Edu? Excl. in [address]? GENERAL APPEARANCE: An unhealthy appearing elderly 27-year-old male in moderate respiratory distress  HEAD: Normocephalic, atraumatic  EYES: Sclera anicteric.no conjunctival injection,   ENT: Mucous membranes moist, no nasal discharge, pharynx clear, no stridor,   NECK: Supple, no meningismus, no JVD  HEART: RRR without rubs murmurs or gallops  LUNGS: End expiratory wheezing with poor overall air movement and bilaterally distant breath sounds  ABDOMEN: Soft, non-tender to palpation, no guarding or rebound. , no mass or distention and no hepatosplenomegaly. EXTREMITIES: Bilateral lower extremity edema (patient says worse than usual)  SKIN: Warm and dry.  Normal color, no rash,  capillary refill less than 2 seconds  MENTAL STATUS: Alert, oriented, interactive,   NEUROLOGICAL:  No facial drooping. moves all 4 extremities, sensation intact, no focal findings     Nursing note and vital signs reviewed     I have reviewed and interpreted all of the currently available lab results from this visit (if applicable):  Results for orders placed or performed during the hospital encounter of 07/17/19   CBC Auto Differential   Result Value Ref Range    WBC 12.4 (H) 4.0 - 11.0 K/uL    RBC 3.72 (L) 4.20 - 5.90 M/uL    Hemoglobin 12.6 (L) 13.5 - 17.5 g/dL    Hematocrit 38.5 (L) 40.5 - 52.5 %    .3 (H) 80.0 - 100.0 fL    MCH 33.9 26.0 - 34.0 pg    MCHC 32.8 31.0 - 36.0 g/dL    RDW 13.7 12.4 - 15.4 %    Platelets 317 541 - 959 K/uL    MPV 8.5 5.0 - 10.5 fL    Neutrophils % 88.8 %    Lymphocytes % 5.7 %    Monocytes % 5.1 %    Eosinophils % 0.0 %    Basophils % 0.4 %    Neutrophils # 11.0 (H) 1.7 - 7.7 K/uL    Lymphocytes # 0.7 (L) 1.0 - 5.1 K/uL    Monocytes # 0.6 0.0 - 1.3 K/uL    Eosinophils # 0.0 0.0 - 0.6 K/uL    Basophils # 0.0 0.0 - 0.2 K/uL   Comprehensive Metabolic Panel   Result Value Ref Range    Sodium 140 136 - 145 mmol/L    Potassium 4.6 3.5 - 5.1 mmol/L    Chloride 82 (L) 99 - 110 mmol/L    CO2 >50 (HH) 21 - 32 mmol/L    Anion Gap 8 3 - 16    Glucose 183 (H) 70 - 99 mg/dL    BUN 22 (H) 7 - 20 mg/dL    CREATININE 0.7 (L) 0.8 - 1.3 mg/dL    GFR Non-African American >60 >60    GFR African American >60 >60    Calcium 10.0 8.3 - 10.6 mg/dL    Total Protein 7.0 6.4 - 8.2 g/dL    Alb 4.5 3.4 - 5.0 g/dL    Albumin/Globulin Ratio 1.8 1.1 - 2.2    Total Bilirubin 0.4 0.0 - 1.0 mg/dL    Alkaline Phosphatase 84 40 - 129 U/L    ALT 19 10 - 40 U/L    AST 15 15 - 37 U/L    Globulin 2.5 g/dL   Lactic Acid, Plasma   Result Value Ref Range    Lactic Acid 1.3 0.4 - 2.0 mmol/L   Brain Natriuretic Peptide   Result Value Ref Range    Pro- 0 - 449 pg/mL   Troponin   Result Value Ref Range    Troponin <0.01 <0.01 ng/mL   Blood gas, venous   Result Value Ref Range    pH, Charanjit 7.363 7.350 - 7.450    pCO2, Charanjit 100.4 (H) 40.0 - 50.0

## 2019-07-18 NOTE — PLAN OF CARE
Nutrition Problem:  Moderate malnutrition  Intervention: Food and/or Nutrient Delivery: Continue current diet, Start ONS  Nutritional Goals: patient will consume 50% or greater of his meals on general diet order x 3 meals per day + 50% or greater of Ensure Compact with meals; weight will remain stable during remainder of admission

## 2019-07-19 NOTE — CARE COORDINATION
[facility]  Pt is active with [facility]. Services are on HOLD due to admit. Liaison will continue to follow for [facility]. needs until discharge.     Electronically signed by Neil Marquez RN on 7/18/2019 at 1:14 PM
INTERDISCIPLINARY PLAN OF CARE CONFERENCE    Date/Time: 7/19/2019 11:25 AM  Completed by: Sergio David Case Management      Patient Name:  Asuncion Burrell  YOB: 1945  Admitting Diagnosis: Acute respiratory failure ([facility] [J96.00]  Acute respiratory failure ([facility] [facility] [J96.00]     Admit Date/Time:  7/17/2019 10:27 PM    Chart reviewed. Interdisciplinary team met to discuss patient progress and discharge plans. Disciplines included Case Management, Nursing, and Dietitian. Current Status:Inpt status    PT/OT recommendation:n/a    Anticipated Discharge Date: TBD  Expected D/C Disposition:  ?  Confirmed plan with patient and/or family Yes  Discharge Plan Comments: Pt asking to return home. Plan is for Pt, dtr Radha Galvez and [facility] hospice to meet to discuss hospice options. Home O2 in place on admit: Yes  Pt informed of need to bring portable home O2 tank on day of discharge for nursing to connect prior to leaving:  Needs reinforcement. Verbalized agreement/Understanding:  Needs reinforcement.
him to pharmacy hopefully today)   Barriers to Medication Adherence:  None  Are you able to afford your medications?:  No  How often do you have difficulty taking your medications?:  Sometimes I take them as prescribed. Housing Review  Who do you live with?:  Alone  Are you an active caregiver in your home?:  No  Social Support  Do you have a ?:  No  Do you have a [facility]?:  Yes  Silvia Cho Name:  [place] [employer] RN case manager  [address] [place] [address]  S:  [phone] [facility].  Patient DME:  Shower chair  Patient Home Equipment:  Oxygen, Nebulizer (Comment: does not use nebulizer because it causes him anxiety - will only use inhalers)  Functional Review  Ability to seek help/take action for Emergent/Urgent situations i.e. fire, crime, inclement weather or health crisis. :  Independent  Ability handle personal hygiene needs (bathing/dressing/grooming):  Needs Assistance  Ability to manage medications:  Needs Assistance  Ability to prepare food:  Needs Assistance  Ability to maintain home (clean home, laundry):  Dependent  Ability to drive and/or has transportation:  Needs Assistance  Ability to do shopping:  Needs Assistance  Ability to manage finances:  Needs Assistance  Is patient able to live independently?:  No  Hearing and Vision  Visual Impairment:  Visual impairment (Glasses/contacts)  Hearing Impairment:  Hard of hearing  Care Transitions Interventions   Medication Assistance Program:  Completed          Readmit from home with [facility]. Active in care transition. This is 3rd admit in 30 days. Lives alone. Last admit said his GENE will be providing 24/7 but once home was alone after first night. Relies on HC to assist with medication set up. Today met with patient in ICU. States he is not doing well. Interview cut short because RN needed to provide bedside care. Care transition following.      Follow Up  Future Appointments   Date Time Provider Joanne Kessler   7/30/2019

## 2019-07-20 NOTE — PROGRESS NOTES
07/18/19 0210   NIV Type   Equipment Type v60   Mode BIPAP   Mask Type Full face mask   Mask Size Medium   Settings/Measurements   Comfort Level Good   Using Accessory Muscles No   IPAP 16 cmH20   EPAP 8 cmH2O   Rate Ordered 12   Resp 22   SpO2 97   FiO2  50 %   Vt Exhaled 502 mL   Mask Leak (lpm) 42 lpm   Skin Protection for O2 Device Yes
Family met with Hospice, pt is going inpatient with [facility] hospice. Waiting for transport at this time.
Inpatient Physical Therapy Evaluation and Treatment    Unit: ICU  Date:  2019  Patient Name:    Elizabeth Stephens  Admitting diagnosis:  Acute respiratory failure ([facility] [address] [J96.00]  Acute respiratory failure ([facility] [address] [J96.00]  Admit Date:  2019  Precautions/Restrictions/WB Status/ Lines/ Wounds/ Oxygen: fall risk, IV, supplemental O2 (5L) and ICU monitoring    Treatment Time:  6709-5969  Treatment Number:  1   Timed Code Treatment Minutes: 24 minutes  Total Treatment Minutes:  34  minutes    Patient Goals for Therapy: \" Go home \"          Discharge Recommendations: TBD pending goals of care, noted hospice c/s this evening with family  DME needs for discharge: Needs Met       Therapy recommendation for EMS Transport: can transport by wheelchair    Therapy recommendations for staff:   Assist of 1 with use of rolling walker (RW) for all transfers and ambulation to/from bathroom    Home Health S4 Level Recommendation:  NA  AM-PAC Mobility Score    AM-PAC Inpatient Mobility Raw Score : 18       Preadmission Environment    Patient states he lives alone   Home Environment: 1 story ranch  Steps to enter: 3 ABDOULAYE and no hand rail   Bathroom: Walk in [address], Grab bars, Shower Chair  and Standard height toilet  Equipment owned: Rolling Walker, Shower Chair, home O2 (2.5-3 L) continous and pulse ox, inhaler, nebulizer      Preadmission Status:  Pt. Able to drive: Yes  Pt Fully independent with ADLs: Yes  Pt. Required assistance from family for: Independent PTA  Pt. Fully independent for transfers and gait and walked with No Device  History of falls No    Pain   Yes  Location: \"All over\"  Ratin /10  Pain Medicine Status: RN notified    Cognition    A&O x4   Able to follow 2 step commands    Subjective  Patient lying supine in bed with no family present  Pt agreeable to this PT eval & tx. Upper Extremity ROM/Strength  Please see OT evaluation.       Lower Extremity ROM / Strength    AROM WFL: Yes    Formal strength testing
Patient reassessed. Pretty well unchanged. Tolerating off BiPAP now. On 4L NC. Patient denies any further needs at this time. Call light and table within reach.      Electronically signed by Sera Hall RN on 7/18/2019 at 12:00 PM
Patient reassessed. Unchanged. Tolerating off BiPAP. 4L NC.      Electronically signed by Nohemi Kruger RN on 7/18/2019 at 4:04 PM
Pt becomes easily frustrated and is demanding to go home.  He is apprehensive about hospice services and feels that the hospital staff are trying to [facility] him\"
Pulmonary & Critical Care Medicine Progress Note  CC: Acute on chronic respiratory failure with hypoxemia and hypercapnia      Events of Last 24 hours: Patient feels less short of breath, wants to go home. EXAM:  BP (!) 144/88   Pulse 100   Temp 98 °F (36.7 °C) (Oral)   Resp 16   Ht 5' 7\" (1.702 m)   Wt 154 lb 4.8 oz (70 kg)   SpO2 96%   BMI 24.17 kg/m²  on 4L NC  Tmax: 98.9F  CVP:      Intake/Output Summary (Last 24 hours) at 7/20/2019 1140  Last data filed at 7/20/2019 0825  Gross per 24 hour   Intake 690 ml   Output 825 ml   Net -135 ml     Gen: No distress. Normocephalic, atraumatic. Comfortable. Eyes: PERRL. No sclera icterus. No conjunctival injection. ENT: No discharge. Pharynx clear. Neck: Trachea midline. No obvious mass. Resp: No accessory muscle use. No crackles. No wheezes. No rhonchi. No dullness on percussion. Poor air movement  CV: Regular rate. Regular rhythm. No murmur or rub. No edema. GI: Non-tender. Non-distended. No hernia. Skin: Warm and dry. No nodule on exposed extremities. Lymph: No cervical LAD. No supraclavicular LAD. M/S: No cyanosis. No joint deformity. No clubbing. Neuro: Awake. Alert. Moves all four extremities.       Medications:   doxycycline hyclate  100 mg Oral 2 times per day    predniSONE  40 mg Oral Daily    busPIRone  10 mg Oral TID    oxyCODONE  10 mg Oral Q12H    aspirin  81 mg Oral Daily    atorvastatin  40 mg Oral Daily    diltiazem  120 mg Oral Daily    DULoxetine  60 mg Oral Daily    sodium chloride flush  10 mL Intravenous 2 times per day    enoxaparin  40 mg Subcutaneous Daily    ipratropium-albuterol  1 ampule Inhalation 4x daily    mupirocin   Nasal BID     PRN Meds:  sennosides-docusate sodium, bisacodyl, sodium chloride, albuterol sulfate HFA, sodium chloride flush, magnesium hydroxide, ondansetron, oxyCODONE-acetaminophen    Results:  CBC:   Recent Labs     07/17/19  2244   WBC 12.4*   HGB 12.6*   HCT 38.5*   .3*
RESPIRATORY THERAPY ASSESSMENT    Name:  Brandon Copeland Record Number:  [id number]  Age: 75 y.o. Gender: male  : 1945  Today's Date:  2019  Room:  [address] [place] [address]    Assessment     Is the patient being admitted for a COPD or Asthma exacerbation? No   (If yes the patient will be seen every 4 hours for the first 24 hours and then reassessed)    Patient Admission Diagnosis      Allergies  No Known Allergies    Minimum Predicted Vital Capacity:     1006          Actual Vital Capacity: On bipap              Pulmonary History:COPD  Home Oxygen Therapy:  2 liters/min via nasal cannula  Home Respiratory Therapy:Albuterol/Ipratropium Bromide HHN and Budesonide/Formoterol    Current Respiratory Therapy:  duoneb q4wa, albuterol q2 prn, dulera 200 bid          Respiratory Severity Index(RSI)   Patients with orders for inhalation medications, oxygen, or any therapeutic treatment modality will be placed on Respiratory Protocol. They will be assessed with the first treatment and at least every 72 hours thereafter. The following severity scale will be used to determine frequency of treatment intervention.     Smoking History: Pulmonary Disease or Smoking History, Greater than 15 pack year = 2    Social History  Social History     Tobacco Use    Smoking status: Current Every Day Smoker     Packs/day: 2.00     Years: 25.00     Pack years: 50.00     Types: Cigarettes    Smokeless tobacco: Never Used   Substance Use Topics    Alcohol use: No     Alcohol/week: 0.0 standard drinks     Comment: occasional    Drug use: No       Recent Surgical History: None = 0  Past Surgical History  Past Surgical History:   Procedure Laterality Date    AMPUTATION      COLONOSCOPY  2016    colonic polyps    HERNIA REPAIR         Level of Consciousness: Alert, Follows Commands but Disoriented = 1    Level of Activity: Walking with assistance = 1    Respiratory Pattern: Regular Pattern; RR 8-20 = 0    Breath Sounds:
Shift report given to Niru Guzman RN  at bedside. Patient care handed off in stable condition at this time.    Electronically signed by Jihan Bueno RN on 7/18/2019 at 7:26 PM
12.4*   RBC 3.72*   HGB 12.6*   HCT 38.5*   .3*   RDW 13.7        BMP:   Recent Labs     07/17/19  2244 07/19/19  1256 07/20/19  0426    138 136   K 4.6 4.4 3.7   CL 82* 85* 87*   CO2 >50* 41* 44*   BUN 22* 28* 30*   CREATININE 0.7* 0.8 0.7*     BNP: No results for input(s): BNP in the last 72 hours. PT/INR: No results for input(s): PROTIME, INR in the last 72 hours. APTT:No results for input(s): APTT in the last 72 hours. CARDIAC ENZYMES:   Recent Labs     07/17/19 2244   TROPONINI <0.01     FASTING LIPID PANEL:  Lab Results   Component Value Date    CHOL 161 05/23/2018    HDL 67 05/23/2018    TRIG 85 05/23/2018     LIVER PROFILE:   Recent Labs     07/17/19 2244   AST 15   ALT 19   BILITOT 0.4   ALKPHOS 84         XR CHEST PORTABLE   Final Result   No acute abnormality identified.          Assessment & Plan:     Patient Active Problem List    Diagnosis Date Noted    Acute respiratory failure ([address] 07/18/2019    Moderate protein-calorie malnutrition (HCC) 07/18/2019    Acute hypercapnic respiratory failure (HCC)     Lower extremity edema     Tobacco abuse     Pedal edema     Acute on chronic respiratory failure with hypoxia and hypercapnia (HCC)     Acute and chronic respiratory failure with hypercapnia ([address] 06/13/2019    Centrilobular emphysema (HCC)     Chronic respiratory failure with hypoxia (HCC)     Acute on chronic respiratory failure with hypercapnia ([address] 05/06/2019    Macrocytic anemia 05/06/2019    COPD with acute exacerbation (HCC) 05/04/2019    Benign essential hypertension 02/27/2019    Smoker 02/04/2019    Olecranon bursitis of right elbow 04/30/2018    Anxiety 04/30/2018    Right-sided chest wall pain 08/14/2017    Mucopurulent chronic bronchitis ([address] 08/14/2017    Mixed hyperlipidemia 02/13/2017    Coronary artery disease due to lipid rich plaque 10/11/2016    Reactive depression 10/11/2016    Chronic low back pain 07/15/2016    Degeneration of

## 2019-07-20 NOTE — DISCHARGE SUMMARY
ipratropium-albuterol 0.5-2.5 (3) MG/3ML Soln nebulizer solution  Commonly known as:  DUONEB  Use qid     Nebulizer Compressor Kit  1 kit by Does not apply route once for 1 dose     oxyCODONE 10 MG extended release tablet  Commonly known as:  OXYCONTIN     oxyCODONE-acetaminophen  MG per tablet  Commonly known as:  PERCOCET     potassium chloride 20 MEQ extended release tablet  Commonly known as:  KLOR-CON M  Take 1 tablet by mouth daily     predniSONE 20 MG tablet  Commonly known as:  DELTASONE  2 qd - 3 days,1 1/2 qd- 3 days,1 qd- 3 days,1/2 qd- 3 days     PROAIR  (90 Base) MCG/ACT inhaler  Generic drug:  albuterol sulfate HFA  INHALE 2 PUFFS BY MOUTH 4 TIMES DAILY WHEN AWAY FROM HOME     SYMBICORT 160-4.5 MCG/ACT Aero  Generic drug:  budesonide-formoterol  INHALE 2 PUFFS BY MOUTH 2 TIMES A DAY     TUDORZA PRESSAIR 400 MCG/ACT Aepb inhaler  Generic drug:  aclidinium  INHALE 1 PUFF BY MOUTH 2 TIMES A DAY           Where to Get Your Medications      These medications were sent to [address] [facility], [address] [place] [address] [phone]  [place] [address]    Phone:  [phone]   · doxycycline hyclate 100 MG tablet  · predniSONE 20 MG tablet     Information about where to get these medications is not yet available    Ask your nurse or doctor about these medications  · diltiazem 120 MG extended release capsule  · furosemide 40 MG tablet       Discharged in stable condition to inpatient  with hospice care    Follow Up: Follow up with hospice physician    Total time spent on discharge is 35 minutes    GISELE Moss.

## 2019-07-29 NOTE — CARE COORDINATION
Ambulatory Care Coordination Note  7/29/2019  CM Risk Score: 13  Charlson 10 Year Mortality Risk Score: 47%     ACC: Srinivasa Edwards, RN    Summary Note: ACM spoke with patient's brother that assists with his care. He stated that he is about the same. Oxygen at 4l/nc. sats 90%. He is not out of bed much but is able to move about with out equipment, but desats easily. No falls to report. Active with [facility]. They are helping with meds and organizing them. Patient has quit smoking for the last 4 days. Using nicotine replacement patches. Plan to follow up with home care. Family has concern about not know what meds he is on. They will be handling med mgt after home care completed. Will discuss with RN. Family also has an interest in bubble packs/med prepackaging options. Legs are less edematous and wrapped. There are a couple open areas under the wraps. They are covered in gauze. Discussed need to keep them clean and elevated. Reviewed s/s to watch for such as increase in size of wounds, drainage,odor, etc. Home care is assisting with wound care. ACM to follow up with [facility] and discuss current plan of care    Past Medical History:   Diagnosis Date    Amputation finger     Ankle fracture, left     Chronic nasal congestion     Chronic pain syndrome     Compression fracture of lumbar vertebra (HCC)     COPD (chronic obstructive pulmonary disease) (HCC)     DDD (degenerative disc disease)     Depression     Facet joint disease of lumbosacral region     Hyperglycemia     Hyperlipidemia     Insomnia     Radiculopathy     Smoker's respiratory syndrome      Plan    follow up call to [facility] Aida Chen with med mgt options/med list for family  -discuss leg wounds  -discuss social work visits (family stated a sw has been out to the home as well)  -PCP follow up scheduled for tomorrow . F2F planned if able to come.            Care Coordination Interventions    Program Enrollment:  Complex Care  Referral from Primary Care Provider:  No  Suggested Interventions and Community Resources  Fall Risk Prevention:  Not Started  Home Health Services:  Completed (Comment: active with [facility] 7/29/19 trb)  Medi Set or Pill Pack:  Not Started (Comment: currently home care is helping with this. 7/29/19)  Occupational Therapy: In Process (Comment: need to verify status. 7/29/19)  Physical Therapy: In Process (Comment: need to verify status 7/29/19)  Transportation Support:  Completed  Zone Management Tools: In Process (Comment: mailed copd zone teaching and reviewed over the phone with . 7/29/19)         Goals Addressed                 This Visit's Progress       Care Coordination     Conditions and Symptoms   No change     I will schedule office visits, as directed by my provider. I will keep my appointment or reschedule if I have to cancel. I will notify my provider of any barriers to my plan of care. I will follow my Zone Management tool to seek urgent or emergent care. I will notify my provider of any symptoms that indicate a worsening of my condition. Barriers: none  Plan for overcoming my barriers: N/A  Confidence: 6/10  Anticipated Goal Completion Date: 7/19    Reinforced copd zone teaching with  and mailed copy. Oxygen at 4l/nc. Sat 90% unable to complete pulmonary follow up. Patient was not well enough to feel like traveling.  7/28/19  trb         Medication Management   No change     I will take my medication as directed. I will notify my provider of any problems with medications, like adverse effects or side effects. I will notify my provider/Care Coordinator if I am unable to afford my medications. I will notify my provider for advice before I stop taking any of my medication. I will look to have set up in medi set or blister packs to help with mgt.      Barriers: impairment:  cognitive  Plan for overcoming my barriers: N/A  Confidence: 7/10  Anticipated Goal Completion Date: 9/19    Discussed need for assist with medication. Home care active. Discussed blister packs and prepackaging options for med mgt. Home care is currently assisting. Brother with concerns he does not know what he is on. Follow up with home health for additional med mgt assistance. 7/29/19 trb            Prior to Admission medications    Medication Sig Start Date End Date Taking? Authorizing Provider   diltiazem [facility]) 120 MG extended release capsule Take 1 capsule by mouth daily 7/20/19   Sherif Viera MD   predniSONE (DELTASONE) 20 MG tablet 2 qd - 3 days,1 1/2 qd- 3 days,1 qd- 3 days,1/2 qd- 3 days 7/20/19 7/30/19  Sherif Viera MD   furosemide (LASIX) 40 MG tablet Take 1 tablet by mouth daily 7/20/19   Sherif Viera MD   busPIRone (BUSPAR) 10 MG tablet Take 10 mg by mouth 3 times daily    Historical Provider, MD   oxyCODONE (OXYCONTIN) 10 MG extended release tablet Take 10 mg by mouth every 12 hours.     Historical Provider, MD   potassium chloride (KLOR-CON M) 20 MEQ extended release tablet Take 1 tablet by mouth daily 6/10/19   Cira Neville MD   TUDORZA PRESSAIR 400 MCG/ACT AEPB inhaler INHALE 1 PUFF BY MOUTH 2 TIMES A DAY 5/31/19   SAKSHI Johnson CNP   DULoxetine (CYMBALTA) 60 MG extended release capsule Take 1 capsule by mouth daily  Patient taking differently: Take 60 mg by mouth daily Indications: Patient has not taken this medication in a long time per daughter  5/21/19   Cira Neville MD   SYMBICORT 160-4.5 MCG/ACT AERO INHALE 2 PUFFS BY MOUTH 2 TIMES A DAY 4/22/19   Cira Neville MD   fluticasone [facility]) 50 MCG/ACT nasal spray USE 2 SPRAYS IN [facility] NOSTRIL DAILY 3/19/19   Cira Neville MD   aspirin 81 MG tablet Take 81 mg by mouth daily    Historical Provider, MD   PROAIR  (90 Base) MCG/ACT inhaler INHALE 2 PUFFS BY MOUTH [address] [place] [address] 9/21/18   SAKSHI Johnson - CNP

## 2019-07-31 NOTE — ED PROVIDER NOTES
10 mg by mouth every 6 hours as needed.     Yes Historical Provider, MD   aspirin 81 MG tablet Take 81 mg by mouth daily   Yes Historical Provider, MD   senna (SENOKOT) 8.6 MG TABS tablet 2 times daily 7/24/19   Historical Provider, MD page [facility]) 120 MG extended release capsule Take 1 capsule by mouth daily 7/20/19   Bandar Espinoza MD   potassium chloride (KLOR-CON M) 20 MEQ extended release tablet Take 1 tablet by mouth daily 6/10/19   Donna Robles MD   TUDORZA PRESSAIR 400 MCG/ACT AEPB inhaler INHALE 1 PUFF BY MOUTH 2 TIMES A DAY 5/31/19   SAKSHI Perdue CNP   DULoxetine (CYMBALTA) 60 MG extended release capsule Take 1 capsule by mouth daily  Patient taking differently: Take 60 mg by mouth daily Indications: Patient has not taken this medication in a long time per daughter  5/21/19   Donna Robles MD   SYMBICORT 160-4.5 MCG/ACT AERO INHALE 2 PUFFS BY MOUTH 2 TIMES A DAY 4/22/19   Donna Robles MD   fluticasone [facility]) 50 MCG/ACT nasal spray USE 2 SPRAYS IN [facility] NOSTRIL DAILY 3/19/19   Donna Robles MD   PROAIR  (44 Base) MCG/ACT inhaler INHALE 2 PUFFS BY MOUTH 4 TIMES DAILY WHEN AWAY FROM HOME 9/21/18   SAKSHI Perdue CNP   atorvastatin (LIPITOR) 40 MG tablet TAKE 1 TABLET BY MOUTH DAILY 8/21/18   Donna Robles MD   Respiratory Therapy Supplies (NEBULIZER COMPRESSOR) KIT 1 kit by Does not apply route once for 1 dose 8/14/17 7/2/18  Donna Robles MD   ipratropium-albuterol (DUONEB) 0.5-2.5 (3) MG/3ML SOLN nebulizer solution Use qid 8/14/17   Donna Robles MD       Allergies as of 07/31/2019    (No Known Allergies)       Past Medical History:   Diagnosis Date    Amputation finger     Ankle fracture, left     Chronic nasal congestion     Chronic pain syndrome     Compression fracture of lumbar vertebra (HCC)     COPD (chronic obstructive pulmonary disease) ([address]     DDD (degenerative disc disease)     Depression     Facet joint disease of lumbosacral region     Hyperglycemia     Hyperlipidemia     Insomnia     Radiculopathy     Smoker's respiratory syndrome         Surgical History:   Past Surgical History:   Procedure Laterality Date    AMPUTATION      COLONOSCOPY  05/02/2016    colonic polyps    HERNIA REPAIR          Family History:    Family History   Problem Relation Age of Onset    Cancer Mother     Diabetes Father     Heart Disease Sister        Social History     Socioeconomic History    Marital status:       Spouse name: Not on file    Number of children: Not on file    Years of education: Not on file    Highest education level: Not on file   Occupational History    Not on file   Social Needs    Financial resource strain: Not on file    Food insecurity:     Worry: Not on file     Inability: Not on file    Transportation needs:     Medical: Not on file     Non-medical: Not on file   Tobacco Use    Smoking status: Current Every Day Smoker     Packs/day: 2.00     Years: 25.00     Pack years: 50.00     Types: Cigarettes    Smokeless tobacco: Never Used   Substance and Sexual Activity    Alcohol use: No     Alcohol/week: 0.0 standard drinks     Comment: occasional    Drug use: No    Sexual activity: Not Currently   Lifestyle    Physical activity:     Days per week: Not on file     Minutes per session: Not on file    Stress: Not on file   Relationships    Social connections:     Talks on phone: Not on file     Gets together: Not on file     Attends Baptism service: Not on file     Active member of club or organization: Not on file     Attends meetings of clubs or organizations: Not on file     Relationship status: Not on file    Intimate partner violence:     Fear of current or ex partner: Not on file     Emotionally abused: Not on file     Physically abused: Not on file     Forced sexual activity: Not on file   Other Topics Concern   

## 2019-07-31 NOTE — ED NOTES
Report given to [facility] (formerly [facility])[3], RN  07/31/19 9530

## 2019-08-01 NOTE — CARE COORDINATION
Patient transferred to [facility] at [facility]. ACM will complete care coordination episode and screen out at this time.

## 2019-08-05 ENCOUNTER — CARE COORDINATION (OUTPATIENT)
Dept: CASE MANAGEMENT | Age: 74
End: 2019-08-05

## 2020-02-11 NOTE — TELEPHONE ENCOUNTER
Attempted to contact patient on 12/5/2017. Result: left message on the patient's voicemail asking patient to return my call. Pre-Visit planning not completed. Last ov 12/9/19 and next ov 3/9/20

## 2022-10-07 NOTE — PROGRESS NOTES
Chief Complaint   Patient presents with    Anxiety    Hypertension    COPD    Other     patient has multiple medical complaints    Fall   About a month ago he feel  Into a recycling compactor, he still has a lump of fluid on left hip area. He states that he was given a tetanus shot at the ER after this incidence. Advised patient that lump may take months to resolve. He is still using the [place] and the Symbicort and still has to use his pro air inhaler 4-5 times a day. HPI:  Leland Cervantes is a 67 y.o. (: 1945) here today for  COPD:  Current treatment includes Tudorza and Symbicort and Pro air. Residual symptoms: sob with exertion. He denies any other symptoms. He requires his rescue inhaler 4-5 time(s) per day. Hypertension:  Home blood pressure monitoring: No.  He is adherent to a low sodium diet. Patient denies chest pain. Antihypertensive medication side effects: no medication side effects noted. Use of agents associated with hypertension: none. Sodium (mmol/L)   Date Value   2018 143    BUN (mg/dL)   Date Value   2018 16    Glucose (mg/dL)   Date Value   2018 109 (H)      Potassium (mmol/L)   Date Value   2018 4.7    CREATININE (mg/dL)   Date Value   2018 0.8         Mood Disorder:  Patient presents for follow-up of anxiety disorder. Current complaints include:trouble sleeping  He denies any other symptoms. Symptoms/signs of miya: none. External stressors: illness or family illness. Current treatment includes: Cymbalta- 60 mg bid and Buspar 10 mg prn. Medication side effects: none.       Patient's medications, allergies, past medical, surgical, social and family histories were reviewed and updated as appropriate on 2018 at 2:48 PM.    ROS:  Review of Systems    All other systems reviewed and are negative except as noted above on 2018 at 2:48 PM. Additional review of systems may be scanned into the media section of this medical record. Any responses requiring further intervention were pursued. Hemoglobin A1C (%)   Date Value   05/23/2018 5.7     LDL Calculated (mg/dL)   Date Value   05/23/2018 77     Past Medical History:   Diagnosis Date    Amputation finger     Ankle fracture, left     Chronic nasal congestion     Chronic pain syndrome     Compression fracture of lumbar vertebra (HCC)     COPD (chronic obstructive pulmonary disease) (HCC)     DDD (degenerative disc disease)     Depression     Facet joint disease of lumbosacral region ([facility] [address]     Hyperglycemia     Hyperlipidemia     Insomnia     Radiculopathy     Smoker's respiratory syndrome      Family History   Problem Relation Age of Onset    Cancer Mother     Diabetes Father     Heart Disease Sister      Social History     Social History    Marital status:      Spouse name: N/A    Number of children: N/A    Years of education: N/A     Occupational History    Not on file. Social History Main Topics    Smoking status: Current Some Day Smoker     Packs/day: 2.00     Years: 25.00     Types: Cigarettes    Smokeless tobacco: Never Used    Alcohol use No      Comment: occasional    Drug use: No    Sexual activity: Not Currently     Other Topics Concern    Not on file     Social History Narrative    No narrative on file       Prior to Visit Medications    Medication Sig Taking?  Authorizing Provider   losartan (COZAAR) 100 MG tablet Take 1 tablet by mouth daily  SAKSHI Larose CNP   atorvastatin (LIPITOR) 40 MG tablet TAKE 1 TABLET BY MOUTH DAILY  Zita Babinski, MD   fluticasone (FLONASE) 50 MCG/ACT nasal spray USE 2 SPRAYS IN EACH NOSTRIL DAILY  SAKSHI Laroes CNP   busPIRone (BUSPAR) 10 MG tablet Take 1 tablet by mouth 3 times daily  Zita Babinski, MD   DULoxetine (CYMBALTA) 60 MG extended release capsule Take 1 capsule by mouth 2 times daily  Zita Babinski, MD [facility]

## 2022-11-03 NOTE — DISCHARGE SUMMARY
SIGNIFICANT improvement in Diabetes  Well done!!!  Cont current meds, Diabetic diet and daily exercise  Follow up due next month  Please notify patient of results  Marcela Sandoval MD   No accessory muscle use. No crackles. Mild wheezing. No rhonchi. No dullness on percussion. CV: Regular rate. Regular rhythm. No mumur or rub. + edema. No JVD. Palpable pedal pulses. GI: Non-tender. Non-distended. No masses. No organmegaly. Normal bowel sounds. No hernia. Skin: Warm and dry. No nodule on exposed extremities. No rash on exposed extremities. Lymph: No cervical LAD. No supraclavicular LAD. M/S: No cyanosis. No joint deformity. No clubbing. Neuro: Awake. Follows commands. Positive pupils/gag/corneals. Normal pain response. Psych: Oriented to person, place, time. No anxiety or agitation. CBC:   Recent Labs     07/06/19 1728 07/07/19 0416   WBC 10.6 7.0   HGB 12.8* 12.0*   HCT 39.4* 36.5*   .4* 105.3*    255     BMP:   Recent Labs     07/06/19 1728 07/07/19 0416    140   K 4.6 4.2   CL 85* 89*   CO2 48* 41*   BUN 31* 27*   CREATININE 0.6* <0.5*     LIVER PROFILE:   Recent Labs     07/06/19 1728   AST 18   ALT 19   BILITOT 0.6   ALKPHOS 84     PT/INR:   Recent Labs     07/06/19 1728   PROTIME 10.2   INR 0.89       RADIOLOGY  XR CHEST PORTABLE   Final Result   No acute abnormality is detected.                Discharge Medications     Medication List      START taking these medications    azithromycin 250 MG tablet  Commonly known as:  ZITHROMAX  Take 1 tablet by mouth daily for 2 days  Start taking on:  7/9/2019        CHANGE how you take these medications    furosemide 40 MG tablet  Commonly known as:  LASIX  Take 1 tablet by mouth 2 times daily  What changed:    · how much to take  · how to take this  · when to take this  · additional instructions     predniSONE 20 MG tablet  Commonly known as:  DELTASONE  2 qd - 3 days,1 1/2 qd- 3 days,1 qd- 3 days,1/2 qd- 3 days  What changed:  additional instructions        CONTINUE taking these medications    aspirin 81 MG tablet     atorvastatin 40 MG tablet  Commonly known as:  LIPITOR  TAKE 1 TABLET BY MOUTH DAILY
